# Patient Record
Sex: MALE | Race: WHITE | NOT HISPANIC OR LATINO | Employment: OTHER | URBAN - METROPOLITAN AREA
[De-identification: names, ages, dates, MRNs, and addresses within clinical notes are randomized per-mention and may not be internally consistent; named-entity substitution may affect disease eponyms.]

---

## 2019-05-29 ENCOUNTER — HOSPITAL ENCOUNTER (INPATIENT)
Facility: HOSPITAL | Age: 74
LOS: 7 days | Discharge: HOME HEALTH | DRG: 234 | End: 2019-06-05
Attending: EMERGENCY MEDICINE | Admitting: THORACIC SURGERY (CARDIOTHORACIC VASCULAR SURGERY)
Payer: MEDICARE

## 2019-05-29 DIAGNOSIS — R07.9 CHEST PAIN: ICD-10-CM

## 2019-05-29 DIAGNOSIS — Z95.1 S/P CABG X 5: Primary | ICD-10-CM

## 2019-05-29 DIAGNOSIS — I21.4 NSTEMI (NON-ST ELEVATED MYOCARDIAL INFARCTION): ICD-10-CM

## 2019-05-29 DIAGNOSIS — Z98.890 POST-OPERATIVE STATE: ICD-10-CM

## 2019-05-29 DIAGNOSIS — M76.899 OTHER SPECIFIED ENTHESOPATHIES OF UNSPECIFIED LOWER LIMB, EXCLUDING FOOT: ICD-10-CM

## 2019-05-29 PROBLEM — I25.119 CORONARY ARTERY DISEASE INVOLVING NATIVE CORONARY ARTERY OF NATIVE HEART WITH ANGINA PECTORIS: Status: ACTIVE | Noted: 2019-05-29

## 2019-05-29 PROBLEM — R94.31 ABNORMAL ECG: Status: ACTIVE | Noted: 2019-05-29

## 2019-05-29 PROBLEM — I20.9 AP (ANGINA PECTORIS): Status: ACTIVE | Noted: 2019-05-29

## 2019-05-29 PROBLEM — D72.829 LEUKOCYTOSIS: Status: ACTIVE | Noted: 2019-05-29

## 2019-05-29 LAB
ALBUMIN SERPL BCP-MCNC: 3.8 G/DL (ref 3.5–5.2)
ALP SERPL-CCNC: 80 U/L (ref 55–135)
ALT SERPL W/O P-5'-P-CCNC: 16 U/L (ref 10–44)
ANION GAP SERPL CALC-SCNC: 9 MMOL/L (ref 8–16)
APTT BLDCRRT: 26.2 SEC (ref 21–32)
AST SERPL-CCNC: 60 U/L (ref 10–40)
BASOPHILS # BLD AUTO: 0.03 K/UL (ref 0–0.2)
BASOPHILS NFR BLD: 0.2 % (ref 0–1.9)
BILIRUB SERPL-MCNC: 0.6 MG/DL (ref 0.1–1)
BNP SERPL-MCNC: 247 PG/ML (ref 0–99)
BUN SERPL-MCNC: 9 MG/DL (ref 8–23)
CALCIUM SERPL-MCNC: 10.2 MG/DL (ref 8.7–10.5)
CHLORIDE SERPL-SCNC: 102 MMOL/L (ref 95–110)
CHOLEST SERPL-MCNC: 194 MG/DL (ref 120–199)
CHOLEST/HDLC SERPL: 4.6 {RATIO} (ref 2–5)
CO2 SERPL-SCNC: 25 MMOL/L (ref 23–29)
CORONARY STENOSIS: ABNORMAL
CREAT SERPL-MCNC: 0.9 MG/DL (ref 0.5–1.4)
DIASTOLIC DYSFUNCTION: NO
DIFFERENTIAL METHOD: ABNORMAL
EOSINOPHIL # BLD AUTO: 0 K/UL (ref 0–0.5)
EOSINOPHIL NFR BLD: 0.3 % (ref 0–8)
ERYTHROCYTE [DISTWIDTH] IN BLOOD BY AUTOMATED COUNT: 12.7 % (ref 11.5–14.5)
EST. GFR  (AFRICAN AMERICAN): >60 ML/MIN/1.73 M^2
EST. GFR  (NON AFRICAN AMERICAN): >60 ML/MIN/1.73 M^2
ESTIMATED PA SYSTOLIC PRESSURE: 30.46
GLUCOSE SERPL-MCNC: 138 MG/DL (ref 70–110)
HCT VFR BLD AUTO: 42.6 % (ref 40–54)
HDLC SERPL-MCNC: 42 MG/DL (ref 40–75)
HDLC SERPL: 21.6 % (ref 20–50)
HGB BLD-MCNC: 15.1 G/DL (ref 14–18)
INR PPP: 0.9 (ref 0.8–1.2)
LDLC SERPL CALC-MCNC: 134 MG/DL (ref 63–159)
LYMPHOCYTES # BLD AUTO: 1.3 K/UL (ref 1–4.8)
LYMPHOCYTES NFR BLD: 9.1 % (ref 18–48)
MCH RBC QN AUTO: 30.2 PG (ref 27–31)
MCHC RBC AUTO-ENTMCNC: 35.4 G/DL (ref 32–36)
MCV RBC AUTO: 85 FL (ref 82–98)
MONOCYTES # BLD AUTO: 1 K/UL (ref 0.3–1)
MONOCYTES NFR BLD: 7.4 % (ref 4–15)
NEUTROPHILS # BLD AUTO: 11.6 K/UL (ref 1.8–7.7)
NEUTROPHILS NFR BLD: 83 % (ref 38–73)
NONHDLC SERPL-MCNC: 152 MG/DL
PLATELET # BLD AUTO: 232 K/UL (ref 150–350)
PMV BLD AUTO: 9.2 FL (ref 9.2–12.9)
POTASSIUM SERPL-SCNC: 3.7 MMOL/L (ref 3.5–5.1)
PROT SERPL-MCNC: 6.6 G/DL (ref 6–8.4)
PROTHROMBIN TIME: 10.3 SEC (ref 9–12.5)
RBC # BLD AUTO: 5 M/UL (ref 4.6–6.2)
RETIRED EF AND QEF - SEE NOTES: 60 (ref 55–65)
SODIUM SERPL-SCNC: 136 MMOL/L (ref 136–145)
TRIGL SERPL-MCNC: 90 MG/DL (ref 30–150)
TROPONIN I SERPL DL<=0.01 NG/ML-MCNC: 6.32 NG/ML (ref 0–0.03)
TROPONIN I SERPL DL<=0.01 NG/ML-MCNC: 7.46 NG/ML (ref 0–0.03)
WBC # BLD AUTO: 13.94 K/UL (ref 3.9–12.7)

## 2019-05-29 PROCEDURE — 80053 COMPREHEN METABOLIC PANEL: CPT

## 2019-05-29 PROCEDURE — 93306 TTE W/DOPPLER COMPLETE: CPT | Mod: 26,,, | Performed by: INTERNAL MEDICINE

## 2019-05-29 PROCEDURE — 93455 CATH LAB PROCEDURE: ICD-10-PCS | Mod: 26,,, | Performed by: INTERNAL MEDICINE

## 2019-05-29 PROCEDURE — 80061 LIPID PANEL: CPT

## 2019-05-29 PROCEDURE — 93010 EKG 12-LEAD: ICD-10-PCS | Mod: 59,ICN,, | Performed by: INTERNAL MEDICINE

## 2019-05-29 PROCEDURE — 21400001 HC TELEMETRY ROOM

## 2019-05-29 PROCEDURE — 99291 CRITICAL CARE FIRST HOUR: CPT | Mod: 25

## 2019-05-29 PROCEDURE — 63600175 PHARM REV CODE 636 W HCPCS: Performed by: EMERGENCY MEDICINE

## 2019-05-29 PROCEDURE — 99223 1ST HOSP IP/OBS HIGH 75: CPT | Mod: 25,,, | Performed by: INTERNAL MEDICINE

## 2019-05-29 PROCEDURE — 63600175 PHARM REV CODE 636 W HCPCS

## 2019-05-29 PROCEDURE — 25000003 PHARM REV CODE 250: Performed by: INTERNAL MEDICINE

## 2019-05-29 PROCEDURE — 93306 2D ECHO WITH COLOR FLOW DOPPLER: ICD-10-PCS | Mod: 26,,, | Performed by: INTERNAL MEDICINE

## 2019-05-29 PROCEDURE — 25000003 PHARM REV CODE 250: Performed by: NURSE PRACTITIONER

## 2019-05-29 PROCEDURE — 85730 THROMBOPLASTIN TIME PARTIAL: CPT

## 2019-05-29 PROCEDURE — 25000003 PHARM REV CODE 250

## 2019-05-29 PROCEDURE — 96361 HYDRATE IV INFUSION ADD-ON: CPT

## 2019-05-29 PROCEDURE — 36415 COLL VENOUS BLD VENIPUNCTURE: CPT

## 2019-05-29 PROCEDURE — 83880 ASSAY OF NATRIURETIC PEPTIDE: CPT

## 2019-05-29 PROCEDURE — 84484 ASSAY OF TROPONIN QUANT: CPT | Mod: 91

## 2019-05-29 PROCEDURE — 85025 COMPLETE CBC W/AUTO DIFF WBC: CPT

## 2019-05-29 PROCEDURE — 99223 PR INITIAL HOSPITAL CARE,LEVL III: ICD-10-PCS | Mod: 25,,, | Performed by: INTERNAL MEDICINE

## 2019-05-29 PROCEDURE — 96374 THER/PROPH/DIAG INJ IV PUSH: CPT

## 2019-05-29 PROCEDURE — 93010 ELECTROCARDIOGRAM REPORT: CPT | Mod: 59,ICN,, | Performed by: INTERNAL MEDICINE

## 2019-05-29 PROCEDURE — C1769 GUIDE WIRE: HCPCS

## 2019-05-29 PROCEDURE — 99152 CATH LAB PROCEDURE: ICD-10-PCS | Mod: ,,, | Performed by: INTERNAL MEDICINE

## 2019-05-29 PROCEDURE — 99152 MOD SED SAME PHYS/QHP 5/>YRS: CPT | Mod: ,,, | Performed by: INTERNAL MEDICINE

## 2019-05-29 PROCEDURE — 85610 PROTHROMBIN TIME: CPT

## 2019-05-29 PROCEDURE — 25000003 PHARM REV CODE 250: Performed by: EMERGENCY MEDICINE

## 2019-05-29 PROCEDURE — 93005 ELECTROCARDIOGRAM TRACING: CPT

## 2019-05-29 PROCEDURE — 85730 THROMBOPLASTIN TIME PARTIAL: CPT | Mod: 91

## 2019-05-29 PROCEDURE — 93455 CORONARY ART/GRFT ANGIO S&I: CPT | Mod: 26,,, | Performed by: INTERNAL MEDICINE

## 2019-05-29 PROCEDURE — 86920 COMPATIBILITY TEST SPIN: CPT

## 2019-05-29 PROCEDURE — 93306 TTE W/DOPPLER COMPLETE: CPT

## 2019-05-29 RX ORDER — HEPARIN SODIUM,PORCINE/D5W 25000/250
12 INTRAVENOUS SOLUTION INTRAVENOUS CONTINUOUS
Status: DISCONTINUED | OUTPATIENT
Start: 2019-05-29 | End: 2019-05-29

## 2019-05-29 RX ORDER — ONDANSETRON 8 MG/1
8 TABLET, ORALLY DISINTEGRATING ORAL EVERY 8 HOURS PRN
Status: DISCONTINUED | OUTPATIENT
Start: 2019-05-29 | End: 2019-06-05 | Stop reason: HOSPADM

## 2019-05-29 RX ORDER — SODIUM CHLORIDE 9 MG/ML
INJECTION, SOLUTION INTRAVENOUS CONTINUOUS
Status: DISCONTINUED | OUTPATIENT
Start: 2019-05-29 | End: 2019-05-30

## 2019-05-29 RX ORDER — SODIUM CHLORIDE 9 MG/ML
INJECTION, SOLUTION INTRAVENOUS CONTINUOUS
Status: DISCONTINUED | OUTPATIENT
Start: 2019-05-29 | End: 2019-05-29

## 2019-05-29 RX ORDER — ASPIRIN 81 MG/1
81 TABLET ORAL DAILY
Status: DISCONTINUED | OUTPATIENT
Start: 2019-05-30 | End: 2019-05-31

## 2019-05-29 RX ORDER — HEPARIN SODIUM,PORCINE/D5W 25000/250
12 INTRAVENOUS SOLUTION INTRAVENOUS CONTINUOUS
Status: ACTIVE | OUTPATIENT
Start: 2019-05-29 | End: 2019-05-31

## 2019-05-29 RX ORDER — ASPIRIN 325 MG
325 TABLET ORAL
Status: COMPLETED | OUTPATIENT
Start: 2019-05-29 | End: 2019-05-29

## 2019-05-29 RX ORDER — METOPROLOL TARTRATE 25 MG/1
25 TABLET, FILM COATED ORAL 2 TIMES DAILY
Status: DISCONTINUED | OUTPATIENT
Start: 2019-05-29 | End: 2019-05-31

## 2019-05-29 RX ORDER — ONDANSETRON 2 MG/ML
4 INJECTION INTRAMUSCULAR; INTRAVENOUS EVERY 12 HOURS PRN
Status: DISCONTINUED | OUTPATIENT
Start: 2019-05-29 | End: 2019-05-29

## 2019-05-29 RX ORDER — ATORVASTATIN CALCIUM 40 MG/1
80 TABLET, FILM COATED ORAL DAILY
Status: DISCONTINUED | OUTPATIENT
Start: 2019-05-29 | End: 2019-06-05 | Stop reason: HOSPADM

## 2019-05-29 RX ORDER — HYDROCODONE BITARTRATE AND ACETAMINOPHEN 5; 325 MG/1; MG/1
1 TABLET ORAL EVERY 6 HOURS PRN
Status: DISCONTINUED | OUTPATIENT
Start: 2019-05-29 | End: 2019-05-31

## 2019-05-29 RX ORDER — ACETAMINOPHEN 325 MG/1
650 TABLET ORAL EVERY 6 HOURS PRN
Status: DISCONTINUED | OUTPATIENT
Start: 2019-05-29 | End: 2019-06-05 | Stop reason: HOSPADM

## 2019-05-29 RX ORDER — HYDROCODONE BITARTRATE AND ACETAMINOPHEN 500; 5 MG/1; MG/1
TABLET ORAL
Status: DISCONTINUED | OUTPATIENT
Start: 2019-05-29 | End: 2019-06-04 | Stop reason: SDUPTHER

## 2019-05-29 RX ADMIN — ASPIRIN 325 MG ORAL TABLET 325 MG: 325 PILL ORAL at 08:05

## 2019-05-29 RX ADMIN — SODIUM CHLORIDE: 9 INJECTION, SOLUTION INTRAVENOUS at 02:05

## 2019-05-29 RX ADMIN — METOPROLOL TARTRATE 25 MG: 25 TABLET ORAL at 09:05

## 2019-05-29 RX ADMIN — HEPARIN SODIUM AND DEXTROSE 12 UNITS/KG/HR: 10000; 5 INJECTION INTRAVENOUS at 10:05

## 2019-05-29 RX ADMIN — NITROGLYCERIN 1 INCH: 20 OINTMENT TOPICAL at 08:05

## 2019-05-29 RX ADMIN — SODIUM CHLORIDE: 0.9 INJECTION, SOLUTION INTRAVENOUS at 10:05

## 2019-05-29 RX ADMIN — ATORVASTATIN CALCIUM 80 MG: 40 TABLET, FILM COATED ORAL at 10:05

## 2019-05-29 NOTE — CONSULTS
"[facility] -   Cardiothoracic Surgery  Consult Note    Patient Name: Chapin Calderon  MRN: [id number]  Admission Date: 5/29/2019  Attending Physician: Orville Montero MD  Referring Provider: Self, Aaareferral    Patient information was obtained from patient, spouse/SO, relative(s), past medical records and ER records.     Consults  Subjective:     Principal Problem: NSTEMI (non-ST elevated myocardial infarction)    History of Present Illness: 05/29/2019  The patient is a 73 year old male with 80% proximal LAD stenosis that was worked up for NSTEMI. The patient has a history of prostate cancer. The patient was in his usual state of health prior to 1 day ago, when he had an episode of mid-sternal chest pain that began while he was eating breakfast. Pain described as a "pressure". The patient reports that this chest pain episode lasted approximately 12 hours. The patient denies any episodes of this nature occurring prior to the mentioned episode 1 day ago. The patient reports that prior to yesterday, he has been living an active lifestyle. 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 The patient arrived to the ED today for his complaints. Trop 7.465. . Patient had a cardiac catheterization done today as well as echocardiogram. Patient denies SOB, orthopnea, cough, palpitations, fever, malaise, chills, syncope, dizziness, light-headedness, anxiety, abdominal pain, constipation, diarrhea, nausea, vomiting, and diaphoresis.          No current facility-administered medications on file prior to encounter.      Current Outpatient Medications on File Prior to Encounter   Medication Sig    naproxen (EC-NAPROSYN) 375 MG TbEC EC tablet Take 1 tablet (375 mg total) by mouth 2 (two) times daily with meals.       Review of patient's allergies indicates:   Allergen Reactions    Penicillins Rash       Past Medical History:   Diagnosis Date    Prostate cancer      Past Surgical History: "   Procedure Laterality Date    APPENDECTOMY       Family History     None        Tobacco Use    Smoking status: Former Smoker    Smokeless tobacco: Never Used   Substance and Sexual Activity    Alcohol use: No    Drug use: No    Sexual activity: Not on file     Review of Systems   Constitutional: Positive for fatigue. Negative for activity change, appetite change, chills, diaphoresis, fever and unexpected weight change.   HENT: Positive for congestion. Negative for ear discharge, ear pain, hearing loss, nosebleeds, sinus pressure and sinus pain.    Eyes: Negative for photophobia and visual disturbance.   Respiratory: Positive for chest tightness. Negative for cough, choking, shortness of breath, wheezing and stridor.    Cardiovascular: Positive for chest pain. Negative for palpitations and leg swelling.   Gastrointestinal: Negative for abdominal distention, abdominal pain, blood in stool, constipation, diarrhea, nausea and vomiting.   Endocrine: Negative for polydipsia, polyphagia and polyuria.   Genitourinary: Negative for decreased urine volume, difficulty urinating, dysuria, flank pain, frequency, hematuria and urgency.   Musculoskeletal: Negative for back pain, gait problem, joint swelling, myalgias, neck pain and neck stiffness.   Skin: Negative for pallor, rash and wound.   Allergic/Immunologic: Negative for environmental allergies, food allergies and immunocompromised state.   Neurological: Positive for weakness. Negative for dizziness, seizures, syncope, facial asymmetry, light-headedness, numbness and headaches.   Hematological: Does not bruise/bleed easily.   Psychiatric/Behavioral: Negative for agitation, confusion, hallucinations, self-injury and suicidal ideas. The patient is not nervous/anxious.      Objective:     Vital Signs (Most Recent):  Temp: (P) 97.6 °F (36.4 °C) (05/29/19 1500)  Pulse: 62 (05/29/19 1600)  Resp: 15 (05/29/19 1600)  BP: (!) 117/58 (05/29/19 1600)  SpO2: 98 % (05/29/19 1600)  Vital Signs (24h Range):  Temp:  [97.6 °F (36.4 °C)-98.9 °F (37.2 °C)] (P) 97.6 °F (36.4 °C)  Pulse:  [59-74] 62  Resp:  [14-22] 15  SpO2:  [95 %-98 %] 98 %  BP: (108-168)/(56-80) 117/58     Weight: 75.9 kg (167 lb 5.3 oz)  Body mass index is 26.21 kg/m².    SpO2: 98 %  O2 Device (Oxygen Therapy): room air     Intake/Output - Last 3 Shifts     None           Lines/Drains/Airways     Peripheral Intravenous Line                 Peripheral IV - Single Lumen 05/29/19 0820 20 G Left Forearm less than 1 day                 STS Risk Score:  Risk of Mortality:  1.309%  Renal Failure:  0.648%  Permanent Stroke:  0.912%  Prolonged Ventilation:  4.398%  DSW Infection:  0.117%  Reoperation:  2.001%  Morbidity or Mortality:  7.093%  Short Length of Stay:  57.152%  Long Length of Stay:  2.799%    Physical Exam   Constitutional: He is oriented to person, place, and time. He appears well-developed and well-nourished. No distress.   HENT:   Head: Normocephalic and atraumatic.   Eyes: Pupils are equal, round, and reactive to light. EOM are normal.   Neck: Normal range of motion. Neck supple. No JVD present.   Cardiovascular: Normal rate, regular rhythm and intact distal pulses. Exam reveals no gallop and no friction rub.   No murmur heard.  Pulmonary/Chest: Effort normal and breath sounds normal. No stridor. No respiratory distress. He has no wheezes. He has no rales. He exhibits no tenderness.   Abdominal: Soft. Bowel sounds are normal. He exhibits no distension. There is no tenderness. There is no guarding.   Musculoskeletal: Normal range of motion. He exhibits no edema, tenderness or deformity.   Neurological: He is alert and oriented to person, place, and time. He displays normal reflexes. No cranial nerve deficit or sensory deficit. He exhibits normal muscle tone. Coordination normal.   Skin: Skin is warm and dry. Capillary refill takes less than 2 seconds. No rash noted. He is not diaphoretic. No erythema. No pallor.    Psychiatric: He has a normal mood and affect. His behavior is normal. Judgment and thought content normal.   Nursing note and vitals reviewed.      Significant Labs:  BMP:   Recent Labs   Lab 05/29/19  0819   *      K 3.7      CO2 25   BUN 9   CREATININE 0.9   CALCIUM 10.2     CBC:   Recent Labs   Lab 05/29/19  0819   WBC 13.94*   RBC 5.00   HGB 15.1   HCT 42.6      MCV 85   MCH 30.2   MCHC 35.4       Significant Diagnostics:  I have reviewed all pertinent imaging results/findings within the past 24 hours.    Assessment/Plan:     NYHA Score: NYHA III: marked limitation of physical activity, comfortable at rest    Coronary artery disease involving native coronary artery of native heart with angina pectoris  05/29/2019  The patient is a 73 year old male with 80% proximal LAD stenosis that was worked up for NSTEMI. The patient has a history of prostate cancer. The patient with 80% proximal LAD stenosis and multivessel coronary artery disease is a candidate for urgent coronary artery bypass grafting. The risks and benefits of surgery have been explained to the patient, his wife, and his adult daughter, in great detail. All questions have been answered to the patient's satisfaction. The patient has agreed to go ahead with coronary artery bypass grafting, which will be scheduled for 05/31/2019.         Thank you for your consult. I will follow-up with patient. Please contact us if you have any additional questions.    Duane Carpenter NP  Cardiothoracic Surgery  [facility] [place]

## 2019-05-29 NOTE — SUBJECTIVE & OBJECTIVE
Past Medical History:   Diagnosis Date    Prostate cancer        Past Surgical History:   Procedure Laterality Date    APPENDECTOMY         Review of patient's allergies indicates:   Allergen Reactions    Penicillins Rash       No current facility-administered medications on file prior to encounter.      Current Outpatient Medications on File Prior to Encounter   Medication Sig    naproxen (EC-NAPROSYN) 375 MG TbEC EC tablet Take 1 tablet (375 mg total) by mouth 2 (two) times daily with meals.     Family History     None        Tobacco Use    Smoking status: Never Smoker    Smokeless tobacco: Never Used   Substance and Sexual Activity    Alcohol use: No    Drug use: No    Sexual activity: Not on file     Review of Systems   Constitution: Negative.   HENT: Negative.    Eyes: Negative.    Cardiovascular: Positive for chest pain.   Respiratory: Negative.    Endocrine: Negative.    Hematologic/Lymphatic: Negative.    Skin: Negative.    Musculoskeletal: Negative.    Gastrointestinal: Negative.    Genitourinary: Negative.    Neurological: Negative.    Psychiatric/Behavioral: Negative.    Allergic/Immunologic: Negative.      Objective:     Vital Signs (Most Recent):  Temp: 98.9 °F (37.2 °C) (05/29/19 0750)  Pulse: 62 (05/29/19 0831)  Resp: 19 (05/29/19 0831)  BP: (!) 152/79 (05/29/19 0831)  SpO2: 96 % (05/29/19 0831) Vital Signs (24h Range):  Temp:  [98.9 °F (37.2 °C)] 98.9 °F (37.2 °C)  Pulse:  [62-74] 62  Resp:  [18-19] 19  SpO2:  [96 %-97 %] 96 %  BP: (152-168)/(79-80) 152/79     Weight: 75.9 kg (167 lb 5.3 oz)  Body mass index is 26.21 kg/m².    SpO2: 96 %  O2 Device (Oxygen Therapy): room air    No intake or output data in the 24 hours ending 05/29/19 1019    Lines/Drains/Airways     Peripheral Intravenous Line                 Peripheral IV - Single Lumen 05/29/19 0820 20 G Left Forearm less than 1 day                Physical Exam   Constitutional: He is oriented to person, place, and time. He appears  well-developed and well-nourished.   HENT:   Head: Normocephalic.   Neck: Normal range of motion. Neck supple. Normal carotid pulses, no hepatojugular reflux and no JVD present. Carotid bruit is not present. No thyromegaly present.   Cardiovascular: Normal rate, regular rhythm, S1 normal, S2 normal and intact distal pulses. PMI is not displaced. Exam reveals no S3, no S4, no distant heart sounds, no friction rub, no midsystolic click and no opening snap.   No murmur heard.  Pulses:       Radial pulses are 2+ on the right side, and 2+ on the left side.   Pulmonary/Chest: Effort normal and breath sounds normal. He has no wheezes. He has no rales.   Abdominal: Soft. Bowel sounds are normal. He exhibits no distension, no abdominal bruit, no ascites and no mass. There is no tenderness.   Musculoskeletal: He exhibits no edema.   Neurological: He is alert and oriented to person, place, and time.   Skin: Skin is warm.   Psychiatric: He has a normal mood and affect. His behavior is normal.   Nursing note and vitals reviewed.      Significant Labs:   BMP:   Recent Labs   Lab 05/29/19 0819   *      K 3.7      CO2 25   BUN 9   CREATININE 0.9   CALCIUM 10.2   , CMP   Recent Labs   Lab 05/29/19 0819      K 3.7      CO2 25   *   BUN 9   CREATININE 0.9   CALCIUM 10.2   PROT 6.6   ALBUMIN 3.8   BILITOT 0.6   ALKPHOS 80   AST 60*   ALT 16   ANIONGAP 9   ESTGFRAFRICA >60   EGFRNONAA >60   , CBC   Recent Labs   Lab 05/29/19 0819   WBC 13.94*   HGB 15.1   HCT 42.6      , INR   Recent Labs   Lab 05/29/19 0819   INR 0.9    and Troponin   Recent Labs   Lab 05/29/19 0819   TROPONINI 6.324*       Significant Imaging: Echocardiogram: 2D echo with color flow doppler: No results found for this or any previous visit.

## 2019-05-29 NOTE — HPI
Chapin Calderon is a 73 year old male with a past medical history of prostate cancer who presented to the ED with complaints of chest pain that began one day prior to presentation. The pain is described as a constant pressure in his left chest that rates a 6/10 on a pain scale. The patient denies radiation and associated symptoms including SOB, nausea, vomiting, diaphoresis and lower extremity edema. He tried Tums with no relieved. However, symptoms resolved shortly after receiving an Asprin in the ED. In the ED, the patient's initial troponin was 6.324 with an increase to 7.465. Code status was discussed with the patient. He is a full code. His wife, Daniela Calderon, is his surrogate medical decision maker.

## 2019-05-29 NOTE — SUBJECTIVE & OBJECTIVE
No current facility-administered medications on file prior to encounter.      Current Outpatient Medications on File Prior to Encounter   Medication Sig    naproxen (EC-NAPROSYN) 375 MG TbEC EC tablet Take 1 tablet (375 mg total) by mouth 2 (two) times daily with meals.       Review of patient's allergies indicates:   Allergen Reactions    Penicillins Rash       Past Medical History:   Diagnosis Date    Prostate cancer      Past Surgical History:   Procedure Laterality Date    APPENDECTOMY       Family History     None        Tobacco Use    Smoking status: Former Smoker    Smokeless tobacco: Never Used   Substance and Sexual Activity    Alcohol use: No    Drug use: No    Sexual activity: Not on file     Review of Systems   Constitutional: Positive for fatigue. Negative for activity change, appetite change, chills, diaphoresis, fever and unexpected weight change.   HENT: Positive for congestion. Negative for ear discharge, ear pain, hearing loss, nosebleeds, sinus pressure and sinus pain.    Eyes: Negative for photophobia and visual disturbance.   Respiratory: Positive for chest tightness. Negative for cough, choking, shortness of breath, wheezing and stridor.    Cardiovascular: Positive for chest pain. Negative for palpitations and leg swelling.   Gastrointestinal: Negative for abdominal distention, abdominal pain, blood in stool, constipation, diarrhea, nausea and vomiting.   Endocrine: Negative for polydipsia, polyphagia and polyuria.   Genitourinary: Negative for decreased urine volume, difficulty urinating, dysuria, flank pain, frequency, hematuria and urgency.   Musculoskeletal: Negative for back pain, gait problem, joint swelling, myalgias, neck pain and neck stiffness.   Skin: Negative for pallor, rash and wound.   Allergic/Immunologic: Negative for environmental allergies, food allergies and immunocompromised state.   Neurological: Positive for weakness. Negative for dizziness, seizures, syncope,  facial asymmetry, light-headedness, numbness and headaches.   Hematological: Does not bruise/bleed easily.   Psychiatric/Behavioral: Negative for agitation, confusion, hallucinations, self-injury and suicidal ideas. The patient is not nervous/anxious.      Objective:     Vital Signs (Most Recent):  Temp: (P) 97.6 °F (36.4 °C) (05/29/19 1500)  Pulse: 62 (05/29/19 1600)  Resp: 15 (05/29/19 1600)  BP: (!) 117/58 (05/29/19 1600)  SpO2: 98 % (05/29/19 1600) Vital Signs (24h Range):  Temp:  [97.6 °F (36.4 °C)-98.9 °F (37.2 °C)] (P) 97.6 °F (36.4 °C)  Pulse:  [59-74] 62  Resp:  [14-22] 15  SpO2:  [95 %-98 %] 98 %  BP: (108-168)/(56-80) 117/58     Weight: 75.9 kg (167 lb 5.3 oz)  Body mass index is 26.21 kg/m².    SpO2: 98 %  O2 Device (Oxygen Therapy): room air     Intake/Output - Last 3 Shifts     None           Lines/Drains/Airways     Peripheral Intravenous Line                 Peripheral IV - Single Lumen 05/29/19 0820 20 G Left Forearm less than 1 day                 STS Risk Score:  Risk of Mortality:  1.309%  Renal Failure:  0.648%  Permanent Stroke:  0.912%  Prolonged Ventilation:  4.398%  DSW Infection:  0.117%  Reoperation:  2.001%  Morbidity or Mortality:  7.093%  Short Length of Stay:  57.152%  Long Length of Stay:  2.799%    Physical Exam   Constitutional: He is oriented to person, place, and time. He appears well-developed and well-nourished. No distress.   HENT:   Head: Normocephalic and atraumatic.   Eyes: Pupils are equal, round, and reactive to light. EOM are normal.   Neck: Normal range of motion. Neck supple. No JVD present.   Cardiovascular: Normal rate, regular rhythm and intact distal pulses. Exam reveals no gallop and no friction rub.   No murmur heard.  Pulmonary/Chest: Effort normal and breath sounds normal. No stridor. No respiratory distress. He has no wheezes. He has no rales. He exhibits no tenderness.   Abdominal: Soft. Bowel sounds are normal. He exhibits no distension. There is no  tenderness. There is no guarding.   Musculoskeletal: Normal range of motion. He exhibits no edema, tenderness or deformity.   Neurological: He is alert and oriented to person, place, and time. He displays normal reflexes. No cranial nerve deficit or sensory deficit. He exhibits normal muscle tone. Coordination normal.   Skin: Skin is warm and dry. Capillary refill takes less than 2 seconds. No rash noted. He is not diaphoretic. No erythema. No pallor.   Psychiatric: He has a normal mood and affect. His behavior is normal. Judgment and thought content normal.   Nursing note and vitals reviewed.      Significant Labs:  BMP:   Recent Labs   Lab 05/29/19  0819   *      K 3.7      CO2 25   BUN 9   CREATININE 0.9   CALCIUM 10.2     CBC:   Recent Labs   Lab 05/29/19  0819   WBC 13.94*   RBC 5.00   HGB 15.1   HCT 42.6      MCV 85   MCH 30.2   MCHC 35.4       Significant Diagnostics:  I have reviewed all pertinent imaging results/findings within the past 24 hours.

## 2019-05-29 NOTE — ED PROVIDER NOTES
"SCRIBE #1 NOTE: I, Jose Eduardo Sosa, am scribing for, and in the presence of, Grayson Boucher MD. I have scribed the entire note.      History    No chief complaint on file.      Review of patient's allergies indicates:   Allergen Reactions    Penicillins Rash        HPI   HPI    5/29/2019, 8:18 AM   History obtained from the patient      History of Present Illness: Chapin Calderon is a 73 y.o. male patient who presents to the Emergency Department for CP which onset gradually yesterday around 12:00 PM. Patient states his pain is mid-sternal and non-radiating. Patient said it feels like a "balloon swollen up in me". Symptoms are constant and moderate in severity. No mitigating or exacerbating factors reported. Associated sxs include indigestion. Patient denies any HA, fever, chills, numbness, weakness, cough, N/V, dysuria, and all other sxs at this time. No prior Tx. No further complaints or concerns at this time.       Arrival mode: Personal vehicle      PCP: Tiffanie Vicente MD       Past Medical History:  History reviewed. No pertinent medical history.    Past Surgical History:  Past Surgical History:   Procedure Laterality Date    APPENDECTOMY         Family History:  History reviewed. No pertinent family history.    Social History:  Social History     Tobacco Use    Smoking status: Never Smoker    Smokeless tobacco: Never Used   Substance and Sexual Activity    Alcohol use: No    Drug use: No    Sexual activity: Unknown       ROS   Review of Systems   Constitutional: Negative for chills and fever.   HENT: Negative for sore throat.    Respiratory: Negative for cough and shortness of breath.    Cardiovascular: Positive for chest pain.   Gastrointestinal: Negative for nausea and vomiting.        (+) indigestion   Genitourinary: Negative for dysuria.   Musculoskeletal: Negative for back pain.   Skin: Negative for rash.   Neurological: Negative for weakness and numbness.   Hematological: Does not " bruise/bleed easily.   All other systems reviewed and are negative.    Physical Exam      Initial Vitals [05/29/19 0750]   BP Pulse Resp Temp SpO2   (!) 168/80 74 18 98.9 °F (37.2 °C) 97 %      MAP       --          Physical Exam  Nursing Notes and Vital Signs Reviewed.  Constitutional: Patient is in no acute distress. Well-developed and well-nourished.  Head: Atraumatic. Normocephalic.  Eyes: PERRL. EOM intact. Conjunctivae are not pale. No scleral icterus.  ENT: Mucous membranes are moist. Oropharynx is clear and symmetric.    Neck: Supple. Full ROM. No lymphadenopathy.  Cardiovascular: Regular rate. Regular rhythm. No murmurs, rubs, or gallops. Distal pulses are 2+ and symmetric.  Pulmonary/Chest: No respiratory distress. Clear to auscultation bilaterally. No wheezing or rales.  Abdominal: Soft and non-distended.  There is no tenderness.  No rebound, guarding, or rigidity.  Musculoskeletal: Moves all extremities. No obvious deformities. No edema. No calf tenderness.  Skin: Warm and dry.  Neurological:  Alert, awake, and appropriate.  Normal speech.  No acute focal neurological deficits are appreciated.  Psychiatric: Normal affect. Good eye contact. Appropriate in content.    ED Course    Critical Care  Date/Time: 5/29/2019 10:08 AM  Performed by: Grayson Boucher MD  Authorized by: Grayson Boucher MD   Direct patient critical care time: 7 minutes  Additional history critical care time: 7 minutes  Ordering / reviewing critical care time: 7 minutes  Documentation critical care time: 7 minutes  Consulting other physicians critical care time: 7 minutes  Consult with family critical care time: 5 minutes  Total critical care time (exclusive of procedural time) : 40 minutes  Critical care time was exclusive of separately billable procedures and treating other patients and teaching time.  Critical care was necessary to treat or prevent imminent or life-threatening deterioration of the following  "conditions: cardiac failure (NSTEMI).  Critical care was time spent personally by me on the following activities: blood draw for specimens, development of treatment plan with patient or surrogate, discussions with consultants, interpretation of cardiac output measurements, evaluation of patient's response to treatment, examination of patient, obtaining history from patient or surrogate, ordering and performing treatments and interventions, ordering and review of laboratory studies, ordering and review of radiographic studies, pulse oximetry, re-evaluation of patient's condition and review of old charts.        ED Vital Signs:  Vitals:    05/29/19 0750 05/29/19 0807 05/29/19 0831   BP: (!) 168/80  (!) 152/79   Pulse: 74 66 62   Resp: 18  19   Temp: 98.9 °F (37.2 °C)     TempSrc: Oral     SpO2: 97%  96%   Weight: 75.9 kg (167 lb 5.3 oz)     Height: 5' 7" (1.702 m)         Abnormal Lab Results:  Labs Reviewed   CBC W/ AUTO DIFFERENTIAL - Abnormal; Notable for the following components:       Result Value    WBC 13.94 (*)     Gran # (ANC) 11.6 (*)     Gran% 83.0 (*)     Lymph% 9.1 (*)     All other components within normal limits   COMPREHENSIVE METABOLIC PANEL - Abnormal; Notable for the following components:    Glucose 138 (*)     AST 60 (*)     All other components within normal limits   TROPONIN I - Abnormal; Notable for the following components:    Troponin I 6.324 (*)     All other components within normal limits   B-TYPE NATRIURETIC PEPTIDE - Abnormal; Notable for the following components:     (*)     All other components within normal limits   APTT   PROTIME-INR   PROTIME-INR   APTT   TROPONIN I        All Lab Results:  Results for orders placed or performed during the hospital encounter of 05/29/19   CBC auto differential   Result Value Ref Range    WBC 13.94 (H) 3.90 - 12.70 K/uL    RBC 5.00 4.60 - 6.20 M/uL    Hemoglobin 15.1 14.0 - 18.0 g/dL    Hematocrit 42.6 40.0 - 54.0 %    Mean Corpuscular Volume " 85 82 - 98 fL    Mean Corpuscular Hemoglobin 30.2 27.0 - 31.0 pg    Mean Corpuscular Hemoglobin Conc 35.4 32.0 - 36.0 g/dL    RDW 12.7 11.5 - 14.5 %    Platelets 232 150 - 350 K/uL    MPV 9.2 9.2 - 12.9 fL    Gran # (ANC) 11.6 (H) 1.8 - 7.7 K/uL    Lymph # 1.3 1.0 - 4.8 K/uL    Mono # 1.0 0.3 - 1.0 K/uL    Eos # 0.0 0.0 - 0.5 K/uL    Baso # 0.03 0.00 - 0.20 K/uL    Gran% 83.0 (H) 38.0 - 73.0 %    Lymph% 9.1 (L) 18.0 - 48.0 %    Mono% 7.4 4.0 - 15.0 %    Eosinophil% 0.3 0.0 - 8.0 %    Basophil% 0.2 0.0 - 1.9 %    Differential Method Automated    Comprehensive metabolic panel   Result Value Ref Range    Sodium 136 136 - 145 mmol/L    Potassium 3.7 3.5 - 5.1 mmol/L    Chloride 102 95 - 110 mmol/L    CO2 25 23 - 29 mmol/L    Glucose 138 (H) 70 - 110 mg/dL    BUN, Bld 9 8 - 23 mg/dL    Creatinine 0.9 0.5 - 1.4 mg/dL    Calcium 10.2 8.7 - 10.5 mg/dL    Total Protein 6.6 6.0 - 8.4 g/dL    Albumin 3.8 3.5 - 5.2 g/dL    Total Bilirubin 0.6 0.1 - 1.0 mg/dL    Alkaline Phosphatase 80 55 - 135 U/L    AST 60 (H) 10 - 40 U/L    ALT 16 10 - 44 U/L    Anion Gap 9 8 - 16 mmol/L    eGFR if African American >60 >60 mL/min/1.73 m^2    eGFR if non African American >60 >60 mL/min/1.73 m^2   Troponin I #1   Result Value Ref Range    Troponin I 6.324 (H) 0.000 - 0.026 ng/mL   B-Type natriuretic peptide (BNP)   Result Value Ref Range     (H) 0 - 99 pg/mL   Protime-INR   Result Value Ref Range    Prothrombin Time 10.3 9.0 - 12.5 sec    INR 0.9 0.8 - 1.2   APTT   Result Value Ref Range    aPTT 26.2 21.0 - 32.0 sec         Imaging Results:  Imaging Results          X-Ray Chest AP Portable (Final result)  Result time 05/29/19 08:37:15    Final result by Buddy Swanson MD (05/29/19 08:37:15)                 Impression:      No acute findings.      Electronically signed by: Buddy Swanson MD  Date:    05/29/2019  Time:    08:37             Narrative:    EXAMINATION:  XR CHEST AP PORTABLE    CLINICAL HISTORY:  Chest Pain;    TECHNIQUE:  AP view  of the chest was performed.    COMPARISON:  None    FINDINGS:  The cardiac and mediastinal silhouettes appear within normal limits.   The lungs are clear bilaterally.  No acute osseous findings demonstrated.                             The EKG was ordered, reviewed, and independently interpreted by the ED provider.  Interpretation time: 7:59  Rate: 68 BPM  Rhythm: normal sinus rhythm  Interpretation: Possible Left atrial enlargement. No STEMI.             The Emergency Provider reviewed the vital signs and test results, which are outlined above.    ED Discussion     10:03 AM: Discussed pt's case with Dr. Chase (Cardiology) who recommends pt be given heparin and asprin. Plan on cath lab today.    10:10 AM: Discussed case with GUILLE Narvaez (Hospital Medicine). Dr. Montero agrees with current care and management of pt and accepts admission.   Admitting Service:   Admitting Physician: Dr. Montero  Admit to: Obs Tele    10:20 AM: Re-evaluated pt. I have discussed test results, shared treatment plan, and the need for admission with patient. Pt expresses understanding at this time and agrees with all information. All questions answered. Pt has no further questions or concerns at this time. Pt is ready for admit.      ED Medication(s):  Medications   nitroGLYCERIN 2% TD oint ointment 1 inch (1 inch Topical (Top) Given 5/29/19 0813)   heparin 25,000 units in dextrose 5% (100 units/ml) IV bolus from bag INITIAL BOLUS (max bolus 4000 units) (has no administration in time range)   heparin 25,000 units in dextrose 5% 250 mL (100 units/mL) infusion LOW INTENSITY nomogram - [facility] (has no administration in time range)   heparin 25,000 units in dextrose 5% (100 units/ml) IV bolus from bag - ADDITIONAL PRN BOLUS - 60 units/kg (max bolus 4000 units) (has no administration in time range)   heparin 25,000 units in dextrose 5% (100 units/ml) IV bolus from bag - ADDITIONAL PRN BOLUS - 30 units/kg (max bolus 4000 units) (has no  administration in time range)   0.9%  NaCl infusion (has no administration in time range)   atorvastatin tablet 80 mg (has no administration in time range)   aspirin tablet 325 mg (325 mg Oral Given 5/29/19 0813)     Current Discharge Medication List                Medical Decision Making    Medical Decision Making:   Clinical Tests:   Lab Tests: Ordered and Reviewed  Radiological Study: Ordered and Reviewed  Medical Tests: Ordered and Reviewed           Scribe Attestation:   Scribe #1: I performed the above scribed service and the documentation accurately describes the services I performed. I attest to the accuracy of the note.    Attending:   Physician Attestation Statement for Scribe #1: I, Grayson Boucher MD, personally performed the services described in this documentation, as scribed by Jose Eduardo Sosa, in my presence, and it is both accurate and complete.          Clinical Impression       ICD-10-CM ICD-9-CM   1. NSTEMI (non-ST elevated myocardial infarction) I21.4 410.70   2. Chest pain R07.9 786.50       Disposition:   Disposition: Placed in Observation  Condition: Fair         Grayson Boucher MD  05/29/19 6862

## 2019-05-29 NOTE — HPI
"05/29/2019  The patient is a 73 year old male with 80% proximal LAD stenosis that was worked up for NSTEMI. The patient has a history of prostate cancer. The patient was in his usual state of health prior to 1 day ago, when he had an episode of mid-sternal chest pain that began while he was eating breakfast. Pain described as a "pressure". The patient reports that this chest pain episode lasted approximately 12 hours. The patient denies any episodes of this nature occurring prior to the mentioned episode 1 day ago. The patient reports that prior to yesterday, he has been living an active lifestyle. 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 The patient arrived to the ED today for his complaints. Trop 7.465. . Patient had a cardiac catheterization done today as well as echocardiogram. Patient denies SOB, orthopnea, cough, palpitations, fever, malaise, chills, syncope, dizziness, light-headedness, anxiety, abdominal pain, constipation, diarrhea, nausea, vomiting, and diaphoresis.        "

## 2019-05-29 NOTE — ASSESSMENT & PLAN NOTE
05/29/2019  The patient is a 73 year old male with 80% proximal LAD stenosis that was worked up for NSTEMI. The patient has a history of prostate cancer. The patient with 80% proximal LAD stenosis and multivessel coronary artery disease is a candidate for urgent coronary artery bypass grafting. The risks and benefits of surgery have been explained to the patient, his wife, and his adult daughter, in great detail. All questions have been answered to the patient's satisfaction. The patient has agreed to go ahead with coronary artery bypass grafting, which will be scheduled for 05/31/2019.

## 2019-05-29 NOTE — INTERVAL H&P NOTE
The patient has been examined and the H&P has been reviewed:    I concur with the findings and no changes have occurred since H&P was written.    Anesthesia/Surgery risks, benefits and alternative options discussed and understood by patient/family.          Active Hospital Problems    Diagnosis  POA    NSTEMI (non-ST elevated myocardial infarction) [I21.4]  Yes    Abnormal ECG [R94.31]  Yes    AP (angina pectoris) [I20.9]  Yes      Resolved Hospital Problems   No resolved problems to display.

## 2019-05-29 NOTE — ASSESSMENT & PLAN NOTE
-Continue ASA, heparin, metoprolol and Lipitor.   -Echocardiogram and lipid panel pending.   -Patient was evaluated for Cardiac Rehab and is not a candidate at this time.   -Cardiology is following with plans for [facility] today.

## 2019-05-29 NOTE — CONSULTS
[facility] -   Cardiology  Consult Note    Patient Name: Chapin Calderon  MRN: [id number]  Admission Date: 5/29/2019  Hospital Length of Stay: 0 days  Code Status: No Order   Attending Provider: Grayson Boucher,*   Consulting Provider: Reid Chase MD  Primary Care Physician: Tiffanie Vicente MD  Principal Problem:<principal problem not specified>    Patient information was obtained from patient, spouse/SO, past medical records and ER records.     Inpatient consult to Cardiology  Consult performed by: Reid Chase MD  Consult ordered by: Grayson Boucher MD  Reason for consult: MI        Subjective:     Chief Complaint:  CHEST PAIN     HPI:   Pt c/o chest pain, mid chest, all day yesterday which prompted him to go to ER overnight.  Cp much improved now, seems mostly resolved.  ecg shows NSR, low voltage, possible septal infarct.  Troponin 6.2 on initial labs.   .  No prior h/o CAD.  Active male with no significant health issues otherwise per pt.    Past Medical History:   Diagnosis Date    Prostate cancer        Past Surgical History:   Procedure Laterality Date    APPENDECTOMY         Review of patient's allergies indicates:   Allergen Reactions    Penicillins Rash       No current facility-administered medications on file prior to encounter.      Current Outpatient Medications on File Prior to Encounter   Medication Sig    naproxen (EC-NAPROSYN) 375 MG TbEC EC tablet Take 1 tablet (375 mg total) by mouth 2 (two) times daily with meals.     Family History     None        Tobacco Use    Smoking status: Never Smoker    Smokeless tobacco: Never Used   Substance and Sexual Activity    Alcohol use: No    Drug use: No    Sexual activity: Not on file     Review of Systems   Constitution: Negative.   HENT: Negative.    Eyes: Negative.    Cardiovascular: Positive for chest pain.   Respiratory: Negative.    Endocrine: Negative.    Hematologic/Lymphatic: Negative.    Skin:  Negative.    Musculoskeletal: Negative.    Gastrointestinal: Negative.    Genitourinary: Negative.    Neurological: Negative.    Psychiatric/Behavioral: Negative.    Allergic/Immunologic: Negative.      Objective:     Vital Signs (Most Recent):  Temp: 98.9 °F (37.2 °C) (05/29/19 0750)  Pulse: 62 (05/29/19 0831)  Resp: 19 (05/29/19 0831)  BP: (!) 152/79 (05/29/19 0831)  SpO2: 96 % (05/29/19 0831) Vital Signs (24h Range):  Temp:  [98.9 °F (37.2 °C)] 98.9 °F (37.2 °C)  Pulse:  [62-74] 62  Resp:  [18-19] 19  SpO2:  [96 %-97 %] 96 %  BP: (152-168)/(79-80) 152/79     Weight: 75.9 kg (167 lb 5.3 oz)  Body mass index is 26.21 kg/m².    SpO2: 96 %  O2 Device (Oxygen Therapy): room air    No intake or output data in the 24 hours ending 05/29/19 1019    Lines/Drains/Airways     Peripheral Intravenous Line                 Peripheral IV - Single Lumen 05/29/19 0820 20 G Left Forearm less than 1 day                Physical Exam   Constitutional: He is oriented to person, place, and time. He appears well-developed and well-nourished.   HENT:   Head: Normocephalic.   Neck: Normal range of motion. Neck supple. Normal carotid pulses, no hepatojugular reflux and no JVD present. Carotid bruit is not present. No thyromegaly present.   Cardiovascular: Normal rate, regular rhythm, S1 normal, S2 normal and intact distal pulses. PMI is not displaced. Exam reveals no S3, no S4, no distant heart sounds, no friction rub, no midsystolic click and no opening snap.   No murmur heard.  Pulses:       Radial pulses are 2+ on the right side, and 2+ on the left side.   Pulmonary/Chest: Effort normal and breath sounds normal. He has no wheezes. He has no rales.   Abdominal: Soft. Bowel sounds are normal. He exhibits no distension, no abdominal bruit, no ascites and no mass. There is no tenderness.   Musculoskeletal: He exhibits no edema.   Neurological: He is alert and oriented to person, place, and time.   Skin: Skin is warm.   Psychiatric: He has a  normal mood and affect. His behavior is normal.   Nursing note and vitals reviewed.      Significant Labs:   BMP:   Recent Labs   Lab 05/29/19 0819   *      K 3.7      CO2 25   BUN 9   CREATININE 0.9   CALCIUM 10.2   , CMP   Recent Labs   Lab 05/29/19 0819      K 3.7      CO2 25   *   BUN 9   CREATININE 0.9   CALCIUM 10.2   PROT 6.6   ALBUMIN 3.8   BILITOT 0.6   ALKPHOS 80   AST 60*   ALT 16   ANIONGAP 9   ESTGFRAFRICA >60   EGFRNONAA >60   , CBC   Recent Labs   Lab 05/29/19 0819   WBC 13.94*   HGB 15.1   HCT 42.6      , INR   Recent Labs   Lab 05/29/19 0819   INR 0.9    and Troponin   Recent Labs   Lab 05/29/19 0819   TROPONINI 6.324*       Significant Imaging: Echocardiogram: 2D echo with color flow doppler: No results found for this or any previous visit.    Assessment and Plan:     NSTEMI  RECOMMEND IV HEPARIN GTT, ASA, METOPROLOL, ATORVASTATIN.  NPO  ECHO  IV FLUIDS  LHC TODAY.  INDICATIONS AND ALL RISKS/BENEFITS OF LHC AND POSSIBLE PCI DISCUSSED IN DETAIL WITH PT.  AGREEABLE TO PROCEEDING.  FURTHER RECS TO FOLLOW CATH.      AP (angina pectoris)  See management plan detailed above.     Abnormal ECG  See management plan detailed above.     NSTEMI (non-ST elevated myocardial infarction)  See management plan detailed above.         VTE Risk Mitigation (From admission, onward)        Ordered     heparin 25,000 units in dextrose 5% (100 units/ml) IV bolus from bag INITIAL BOLUS (max bolus 4000 units)  Once      05/29/19 0938     heparin 25,000 units in dextrose 5% 250 mL (100 units/mL) infusion LOW INTENSITY nomogram - [facility]  Continuous      05/29/19 0938     heparin 25,000 units in dextrose 5% (100 units/ml) IV bolus from bag - ADDITIONAL PRN BOLUS - 60 units/kg (max bolus 4000 units)  As needed (PRN)      05/29/19 0938     heparin 25,000 units in dextrose 5% (100 units/ml) IV bolus from bag - ADDITIONAL PRN BOLUS - 30 units/kg (max bolus 4000 units)  As needed (PRN)       05/29/19 0938          Thank you for your consult.    Reid Chase MD  Cardiology   [facility] -

## 2019-05-29 NOTE — OP NOTE
[facility] -   Cardiac Catheterization  Procedure Note    SUMMARY     Chapin Calderon  [id number]  Tiffanie Vicente MD    Date of Procedure: 5/29/2019    Procedure:  1.  LHC  2. LEFT VENTRICULOGRAM 3.  CORONARY ANGIOGRAM      Provider: Reid Chase MD    Assisting Provider:  Trey Higuera    Indications: He was referred for cardiac catheterization to evaluate NSTEMI.    Pre-Procedure Diagnosis:  NSTEMI    Post-Procedure Diagnosis:  Multivessel CAD    Anesthesia: RN IV Sedation    Description of the Findings of the Procedure:     The risks, benefits, complications, treatment options, and expected outcomes were discussed with the patient. The patient and/or family concurred with the proposed plan, giving informed consent. Patient was brought to the cath lab after IV hydration was begun and oral premedication was given    Findings:  Severe multivessel CAD  Normal LVEF  Left radial TR band  See report    Complications: None; patient tolerated the procedure well.    Estimated Blood Loss (EBL): Minimal           Implants: none    Specimens: none    Condition: stable    Disposition: PACU - hemodynamically stable.    Attestation: I was present and scrubbed for the entire procedure.     Recommendations:    Usual post cath care.  CV surgical consult for CABG  Cardiac diet.  Maximize medical tx for CAD.  Risk factor modification.

## 2019-05-29 NOTE — NURSING TRANSFER
Nursing Transfer Note      5/29/2019     Transfer Transferred to room 251 from [facility]     Transfer via BED    Transfer with PERSONAL BELONGINGS    Transported by Alena MARTIN    Medicines sent: NONE    Chart send with patient: YES    Notified:FAMILY WITH PT DURING TRANSPORT  Patient reassessed at: 5/29/2019 AT 1630    Upon arrival to floor: TRANSFERRED TO ROOM. TRANSFERRED TO . TELEMETRY MONITER ON.  IV FLUIDS ON PUMP AT PRESCRIBED RATE.  PROCEDURAL ACCESS SITE WITHOUT BLEEDING OR HEMATOMA.  DRESSING DRY AND INTACT.  CARE HANDED OFF TO VIET MARTIN.

## 2019-05-29 NOTE — H&P (VIEW-ONLY)
[facility] -   Cardiology  Consult Note    Patient Name: Chapin Calderon  MRN: [id number]  Admission Date: 5/29/2019  Hospital Length of Stay: 0 days  Code Status: No Order   Attending Provider: Grayson Boucher,*   Consulting Provider: Reid Chase MD  Primary Care Physician: Tiffanie Vicente MD  Principal Problem:<principal problem not specified>    Patient information was obtained from patient, spouse/SO, past medical records and ER records.     Inpatient consult to Cardiology  Consult performed by: Reid Chase MD  Consult ordered by: Grayson Boucher MD  Reason for consult: MI        Subjective:     Chief Complaint:  CHEST PAIN     HPI:   Pt c/o chest pain, mid chest, all day yesterday which prompted him to go to ER overnight.  Cp much improved now, seems mostly resolved.  ecg shows NSR, low voltage, possible septal infarct.  Troponin 6.2 on initial labs.   .  No prior h/o CAD.  Active male with no significant health issues otherwise per pt.    Past Medical History:   Diagnosis Date    Prostate cancer        Past Surgical History:   Procedure Laterality Date    APPENDECTOMY         Review of patient's allergies indicates:   Allergen Reactions    Penicillins Rash       No current facility-administered medications on file prior to encounter.      Current Outpatient Medications on File Prior to Encounter   Medication Sig    naproxen (EC-NAPROSYN) 375 MG TbEC EC tablet Take 1 tablet (375 mg total) by mouth 2 (two) times daily with meals.     Family History     None        Tobacco Use    Smoking status: Never Smoker    Smokeless tobacco: Never Used   Substance and Sexual Activity    Alcohol use: No    Drug use: No    Sexual activity: Not on file     Review of Systems   Constitution: Negative.   HENT: Negative.    Eyes: Negative.    Cardiovascular: Positive for chest pain.   Respiratory: Negative.    Endocrine: Negative.    Hematologic/Lymphatic: Negative.    Skin:  Negative.    Musculoskeletal: Negative.    Gastrointestinal: Negative.    Genitourinary: Negative.    Neurological: Negative.    Psychiatric/Behavioral: Negative.    Allergic/Immunologic: Negative.      Objective:     Vital Signs (Most Recent):  Temp: 98.9 °F (37.2 °C) (05/29/19 0750)  Pulse: 62 (05/29/19 0831)  Resp: 19 (05/29/19 0831)  BP: (!) 152/79 (05/29/19 0831)  SpO2: 96 % (05/29/19 0831) Vital Signs (24h Range):  Temp:  [98.9 °F (37.2 °C)] 98.9 °F (37.2 °C)  Pulse:  [62-74] 62  Resp:  [18-19] 19  SpO2:  [96 %-97 %] 96 %  BP: (152-168)/(79-80) 152/79     Weight: 75.9 kg (167 lb 5.3 oz)  Body mass index is 26.21 kg/m².    SpO2: 96 %  O2 Device (Oxygen Therapy): room air    No intake or output data in the 24 hours ending 05/29/19 1019    Lines/Drains/Airways     Peripheral Intravenous Line                 Peripheral IV - Single Lumen 05/29/19 0820 20 G Left Forearm less than 1 day                Physical Exam   Constitutional: He is oriented to person, place, and time. He appears well-developed and well-nourished.   HENT:   Head: Normocephalic.   Neck: Normal range of motion. Neck supple. Normal carotid pulses, no hepatojugular reflux and no JVD present. Carotid bruit is not present. No thyromegaly present.   Cardiovascular: Normal rate, regular rhythm, S1 normal, S2 normal and intact distal pulses. PMI is not displaced. Exam reveals no S3, no S4, no distant heart sounds, no friction rub, no midsystolic click and no opening snap.   No murmur heard.  Pulses:       Radial pulses are 2+ on the right side, and 2+ on the left side.   Pulmonary/Chest: Effort normal and breath sounds normal. He has no wheezes. He has no rales.   Abdominal: Soft. Bowel sounds are normal. He exhibits no distension, no abdominal bruit, no ascites and no mass. There is no tenderness.   Musculoskeletal: He exhibits no edema.   Neurological: He is alert and oriented to person, place, and time.   Skin: Skin is warm.   Psychiatric: He has a  normal mood and affect. His behavior is normal.   Nursing note and vitals reviewed.      Significant Labs:   BMP:   Recent Labs   Lab 05/29/19 0819   *      K 3.7      CO2 25   BUN 9   CREATININE 0.9   CALCIUM 10.2   , CMP   Recent Labs   Lab 05/29/19 0819      K 3.7      CO2 25   *   BUN 9   CREATININE 0.9   CALCIUM 10.2   PROT 6.6   ALBUMIN 3.8   BILITOT 0.6   ALKPHOS 80   AST 60*   ALT 16   ANIONGAP 9   ESTGFRAFRICA >60   EGFRNONAA >60   , CBC   Recent Labs   Lab 05/29/19 0819   WBC 13.94*   HGB 15.1   HCT 42.6      , INR   Recent Labs   Lab 05/29/19 0819   INR 0.9    and Troponin   Recent Labs   Lab 05/29/19 0819   TROPONINI 6.324*       Significant Imaging: Echocardiogram: 2D echo with color flow doppler: No results found for this or any previous visit.    Assessment and Plan:     NSTEMI  RECOMMEND IV HEPARIN GTT, ASA, METOPROLOL, ATORVASTATIN.  NPO  ECHO  IV FLUIDS  LHC TODAY.  INDICATIONS AND ALL RISKS/BENEFITS OF LHC AND POSSIBLE PCI DISCUSSED IN DETAIL WITH PT.  AGREEABLE TO PROCEEDING.  FURTHER RECS TO FOLLOW CATH.      AP (angina pectoris)  See management plan detailed above.     Abnormal ECG  See management plan detailed above.     NSTEMI (non-ST elevated myocardial infarction)  See management plan detailed above.         VTE Risk Mitigation (From admission, onward)        Ordered     heparin 25,000 units in dextrose 5% (100 units/ml) IV bolus from bag INITIAL BOLUS (max bolus 4000 units)  Once      05/29/19 0938     heparin 25,000 units in dextrose 5% 250 mL (100 units/mL) infusion LOW INTENSITY nomogram - [facility]  Continuous      05/29/19 0938     heparin 25,000 units in dextrose 5% (100 units/ml) IV bolus from bag - ADDITIONAL PRN BOLUS - 60 units/kg (max bolus 4000 units)  As needed (PRN)      05/29/19 0938     heparin 25,000 units in dextrose 5% (100 units/ml) IV bolus from bag - ADDITIONAL PRN BOLUS - 30 units/kg (max bolus 4000 units)  As needed (PRN)       05/29/19 0938          Thank you for your consult.    Reid Chase MD  Cardiology   [facility] -

## 2019-05-29 NOTE — HPI
Pt c/o chest pain, mid chest, all day yesterday which prompted him to go to ER overnight.  Cp much improved now, seems mostly resolved.  ecg shows NSR, low voltage, possible septal infarct.  Troponin 6.2 on initial labs.   .  No prior h/o CAD.  Active male with no significant health issues otherwise per pt.

## 2019-05-29 NOTE — H&P
[facility] Hospital Medicine  History & Physical    Patient Name: Chapin Calderon  MRN: [id number]  Admission Date: 5/29/2019  Attending Physician: Orville Montero MD   Primary Care Provider: Tiffanie Vicente MD         Patient information was obtained from patient, past medical records and ER records.     Subjective:     Principal Problem:NSTEMI (non-ST elevated myocardial infarction)    Chief Complaint:   Chief Complaint   Patient presents with    Chest Pain        HPI: Chapin Calderon is a 73 year old male with a past medical history of prostate cancer who presented to the ED with complaints of chest pain that began one day prior to presentation. The pain is described as a constant pressure in his left chest that rates a 6/10 on a pain scale. The patient denies radiation and associated symptoms including SOB, nausea, vomiting, diaphoresis and lower extremity edema. He tried Tums with no relieved. However, symptoms resolved shortly after receiving an Asprin in the ED. In the ED, the patient's initial troponin was 6.324 with an increase to 7.465. Code status was discussed with the patient. He is a full code. His wife, Daniela Calderon, is his surrogate medical decision maker.      Past Medical History:   Diagnosis Date    Prostate cancer        Past Surgical History:   Procedure Laterality Date    APPENDECTOMY         Review of patient's allergies indicates:   Allergen Reactions    Penicillins Rash       No current facility-administered medications on file prior to encounter.      Current Outpatient Medications on File Prior to Encounter   Medication Sig    naproxen (EC-NAPROSYN) 375 MG TbEC EC tablet Take 1 tablet (375 mg total) by mouth 2 (two) times daily with meals.     Family History     Reviewed and not pertinent.         Tobacco Use    Smoking status: Former Smoker    Smokeless tobacco: Never Used   Substance and Sexual Activity    Alcohol use: No    Drug use: No    Sexual activity:  Not on file     Review of Systems   Constitutional: Negative for appetite change, chills, diaphoresis, fatigue and fever.   HENT: Negative for congestion, ear pain, mouth sores, sore throat and trouble swallowing.    Eyes: Negative for pain and visual disturbance.   Respiratory: Negative for cough, chest tightness and shortness of breath.    Cardiovascular: Positive for chest pain. Negative for palpitations and leg swelling.   Gastrointestinal: Negative for abdominal pain, constipation, diarrhea and nausea.   Endocrine: Negative for cold intolerance, heat intolerance, polydipsia and polyuria.   Genitourinary: Negative for dysuria, frequency and hematuria.   Musculoskeletal: Negative for arthralgias, back pain, myalgias and neck pain.   Skin: Negative for pallor, rash and wound.   Allergic/Immunologic: Negative for environmental allergies and immunocompromised state.   Neurological: Negative for dizziness, seizures, syncope, weakness, numbness and headaches.   Hematological: Negative for adenopathy. Does not bruise/bleed easily.   Psychiatric/Behavioral: Negative for agitation, confusion and sleep disturbance.     Objective:     Vital Signs (Most Recent):  Temp: 98.9 °F (37.2 °C) (05/29/19 0750)  Pulse: 61 (05/29/19 1233)  Resp: (!) 22 (05/29/19 1233)  BP: (!) 144/72 (05/29/19 1041)  SpO2: 95 % (05/29/19 1233) Vital Signs (24h Range):  Temp:  [98.9 °F (37.2 °C)] 98.9 °F (37.2 °C)  Pulse:  [59-74] 61  Resp:  [14-22] 22  SpO2:  [95 %-97 %] 95 %  BP: (144-168)/(72-80) 144/72     Weight: 75.9 kg (167 lb 5.3 oz)  Body mass index is 26.21 kg/m².    Physical Exam   Constitutional: He is oriented to person, place, and time. He appears well-developed and well-nourished.   HENT:   Head: Normocephalic and atraumatic.   Eyes: Conjunctivae are normal.   Neck: Neck supple. No JVD present.   Cardiovascular: Normal rate, regular rhythm and normal heart sounds.   Pulmonary/Chest: Effort normal and breath sounds normal. He has no  wheezes.   Abdominal: Soft. Bowel sounds are normal. He exhibits no distension. There is no tenderness.   Musculoskeletal: Normal range of motion.   Neurological: He is alert and oriented to person, place, and time.   Skin: Skin is warm and dry. No rash noted.   Psychiatric: He has a normal mood and affect. His behavior is normal. Thought content normal.   Nursing note and vitals reviewed.          Significant Labs:   CBC:   Recent Labs   Lab 05/29/19 0819   WBC 13.94*   HGB 15.1   HCT 42.6        CMP:   Recent Labs   Lab 05/29/19 0819      K 3.7      CO2 25   *   BUN 9   CREATININE 0.9   CALCIUM 10.2   PROT 6.6   ALBUMIN 3.8   BILITOT 0.6   ALKPHOS 80   AST 60*   ALT 16   ANIONGAP 9   EGFRNONAA >60     Troponin:   Recent Labs   Lab 05/29/19 0819 05/29/19  1030   TROPONINI 6.324* 7.465*     All pertinent labs within the past 24 hours have been reviewed.    Significant Imaging: I have reviewed all pertinent imaging results/findings within the past 24 hours.   Imaging Results          IR Heart Cath Images (In process)                X-Ray Chest AP Portable (Final result)  Result time 05/29/19 08:37:15    Final result by Buddy Swanson MD (05/29/19 08:37:15)                 Impression:      No acute findings.      Electronically signed by: Buddy Swanson MD  Date:    05/29/2019  Time:    08:37             Narrative:    EXAMINATION:  XR CHEST AP PORTABLE    CLINICAL HISTORY:  Chest Pain;    TECHNIQUE:  AP view of the chest was performed.    COMPARISON:  None    FINDINGS:  The cardiac and mediastinal silhouettes appear within normal limits.   The lungs are clear bilaterally.  No acute osseous findings demonstrated.                                  Assessment/Plan:     * NSTEMI (non-ST elevated myocardial infarction)  -Continue ASA, heparin, metoprolol and Lipitor.   -No ACE/ARB due to hypotension.   -Echocardiogram and lipid panel pending.   -Patient was evaluated for Cardiac Rehab and is not a  candidate at this time.   -Cardiology is following with plans for [facility] today.       Leukocytosis  -Likely reactive.   -Continue to monitor for signs or symptoms of infection.       VTE Risk Mitigation (From admission, onward)        Ordered     heparin 25,000 units in dextrose 5% 250 mL (100 units/mL) infusion LOW INTENSITY nomogram - [facility]  Continuous      05/29/19 0938     heparin 25,000 units in dextrose 5% (100 units/ml) IV bolus from bag - ADDITIONAL PRN BOLUS - 60 units/kg (max bolus 4000 units)  As needed (PRN)      05/29/19 0938     heparin 25,000 units in dextrose 5% (100 units/ml) IV bolus from bag - ADDITIONAL PRN BOLUS - 30 units/kg (max bolus 4000 units)  As needed (PRN)      05/29/19 0938             GUILLE Narvaez  Department of Hospital Medicine   [facility] -

## 2019-05-29 NOTE — SUBJECTIVE & OBJECTIVE
Past Medical History:   Diagnosis Date    Prostate cancer        Past Surgical History:   Procedure Laterality Date    APPENDECTOMY         Review of patient's allergies indicates:   Allergen Reactions    Penicillins Rash       No current facility-administered medications on file prior to encounter.      Current Outpatient Medications on File Prior to Encounter   Medication Sig    naproxen (EC-NAPROSYN) 375 MG TbEC EC tablet Take 1 tablet (375 mg total) by mouth 2 (two) times daily with meals.     Family History     Reviewed and not pertinent.         Tobacco Use    Smoking status: Former Smoker    Smokeless tobacco: Never Used   Substance and Sexual Activity    Alcohol use: No    Drug use: No    Sexual activity: Not on file     Review of Systems   Constitutional: Negative for appetite change, chills, diaphoresis, fatigue and fever.   HENT: Negative for congestion, ear pain, mouth sores, sore throat and trouble swallowing.    Eyes: Negative for pain and visual disturbance.   Respiratory: Negative for cough, chest tightness and shortness of breath.    Cardiovascular: Positive for chest pain. Negative for palpitations and leg swelling.   Gastrointestinal: Negative for abdominal pain, constipation, diarrhea and nausea.   Endocrine: Negative for cold intolerance, heat intolerance, polydipsia and polyuria.   Genitourinary: Negative for dysuria, frequency and hematuria.   Musculoskeletal: Negative for arthralgias, back pain, myalgias and neck pain.   Skin: Negative for pallor, rash and wound.   Allergic/Immunologic: Negative for environmental allergies and immunocompromised state.   Neurological: Negative for dizziness, seizures, syncope, weakness, numbness and headaches.   Hematological: Negative for adenopathy. Does not bruise/bleed easily.   Psychiatric/Behavioral: Negative for agitation, confusion and sleep disturbance.     Objective:     Vital Signs (Most Recent):  Temp: 98.9 °F (37.2 °C) (05/29/19  0750)  Pulse: 61 (05/29/19 1233)  Resp: (!) 22 (05/29/19 1233)  BP: (!) 144/72 (05/29/19 1041)  SpO2: 95 % (05/29/19 1233) Vital Signs (24h Range):  Temp:  [98.9 °F (37.2 °C)] 98.9 °F (37.2 °C)  Pulse:  [59-74] 61  Resp:  [14-22] 22  SpO2:  [95 %-97 %] 95 %  BP: (144-168)/(72-80) 144/72     Weight: 75.9 kg (167 lb 5.3 oz)  Body mass index is 26.21 kg/m².    Physical Exam   Constitutional: He is oriented to person, place, and time. He appears well-developed and well-nourished.   HENT:   Head: Normocephalic and atraumatic.   Eyes: Conjunctivae are normal.   Neck: Neck supple. No JVD present.   Cardiovascular: Normal rate, regular rhythm and normal heart sounds.   Pulmonary/Chest: Effort normal and breath sounds normal. He has no wheezes.   Abdominal: Soft. Bowel sounds are normal. He exhibits no distension. There is no tenderness.   Musculoskeletal: Normal range of motion.   Neurological: He is alert and oriented to person, place, and time.   Skin: Skin is warm and dry. No rash noted.   Psychiatric: He has a normal mood and affect. His behavior is normal. Thought content normal.   Nursing note and vitals reviewed.          Significant Labs:   CBC:   Recent Labs   Lab 05/29/19  0819   WBC 13.94*   HGB 15.1   HCT 42.6        CMP:   Recent Labs   Lab 05/29/19  0819      K 3.7      CO2 25   *   BUN 9   CREATININE 0.9   CALCIUM 10.2   PROT 6.6   ALBUMIN 3.8   BILITOT 0.6   ALKPHOS 80   AST 60*   ALT 16   ANIONGAP 9   EGFRNONAA >60     Troponin:   Recent Labs   Lab 05/29/19  0819 05/29/19  1030   TROPONINI 6.324* 7.465*     All pertinent labs within the past 24 hours have been reviewed.    Significant Imaging: I have reviewed all pertinent imaging results/findings within the past 24 hours.   Imaging Results          IR Heart Cath Images (In process)                X-Ray Chest AP Portable (Final result)  Result time 05/29/19 08:37:15    Final result by Buddy Swanson MD (05/29/19 08:37:15)                  Impression:      No acute findings.      Electronically signed by: Buddy Swanson MD  Date:    05/29/2019  Time:    08:37             Narrative:    EXAMINATION:  XR CHEST AP PORTABLE    CLINICAL HISTORY:  Chest Pain;    TECHNIQUE:  AP view of the chest was performed.    COMPARISON:  None    FINDINGS:  The cardiac and mediastinal silhouettes appear within normal limits.   The lungs are clear bilaterally.  No acute osseous findings demonstrated.

## 2019-05-30 ENCOUNTER — ANESTHESIA EVENT (OUTPATIENT)
Dept: SURGERY | Facility: HOSPITAL | Age: 74
DRG: 234 | End: 2019-05-30
Payer: MEDICARE

## 2019-05-30 LAB
ABO + RH BLD: NORMAL
ALBUMIN SERPL BCP-MCNC: 3.5 G/DL (ref 3.5–5.2)
ALP SERPL-CCNC: 73 U/L (ref 55–135)
ALT SERPL W/O P-5'-P-CCNC: 13 U/L (ref 10–44)
ANION GAP SERPL CALC-SCNC: 6 MMOL/L (ref 8–16)
APTT BLDCRRT: 34.5 SEC (ref 21–32)
APTT BLDCRRT: 36.6 SEC (ref 21–32)
APTT BLDCRRT: 50.1 SEC (ref 21–32)
APTT BLDCRRT: 54.7 SEC (ref 21–32)
AST SERPL-CCNC: 39 U/L (ref 10–40)
BASOPHILS # BLD AUTO: 0.04 K/UL (ref 0–0.2)
BASOPHILS NFR BLD: 0.4 % (ref 0–1.9)
BILIRUB SERPL-MCNC: 0.6 MG/DL (ref 0.1–1)
BLD GP AB SCN CELLS X3 SERPL QL: NORMAL
BUN SERPL-MCNC: 11 MG/DL (ref 8–23)
CALCIUM SERPL-MCNC: 9.4 MG/DL (ref 8.7–10.5)
CHLORIDE SERPL-SCNC: 100 MMOL/L (ref 95–110)
CO2 SERPL-SCNC: 29 MMOL/L (ref 23–29)
CREAT SERPL-MCNC: 0.8 MG/DL (ref 0.5–1.4)
DIFFERENTIAL METHOD: ABNORMAL
EOSINOPHIL # BLD AUTO: 0.1 K/UL (ref 0–0.5)
EOSINOPHIL NFR BLD: 0.9 % (ref 0–8)
ERYTHROCYTE [DISTWIDTH] IN BLOOD BY AUTOMATED COUNT: 13.1 % (ref 11.5–14.5)
EST. GFR  (AFRICAN AMERICAN): >60 ML/MIN/1.73 M^2
EST. GFR  (NON AFRICAN AMERICAN): >60 ML/MIN/1.73 M^2
GLUCOSE SERPL-MCNC: 98 MG/DL (ref 70–110)
HCT VFR BLD AUTO: 39.8 % (ref 40–54)
HGB BLD-MCNC: 13.7 G/DL (ref 14–18)
INR PPP: 1 (ref 0.8–1.2)
LYMPHOCYTES # BLD AUTO: 1.7 K/UL (ref 1–4.8)
LYMPHOCYTES NFR BLD: 15.1 % (ref 18–48)
MAGNESIUM SERPL-MCNC: 1.9 MG/DL (ref 1.6–2.6)
MCH RBC QN AUTO: 29.8 PG (ref 27–31)
MCHC RBC AUTO-ENTMCNC: 34.4 G/DL (ref 32–36)
MCV RBC AUTO: 87 FL (ref 82–98)
MONOCYTES # BLD AUTO: 1.4 K/UL (ref 0.3–1)
MONOCYTES NFR BLD: 12.4 % (ref 4–15)
NEUTROPHILS # BLD AUTO: 7.8 K/UL (ref 1.8–7.7)
NEUTROPHILS NFR BLD: 71.2 % (ref 38–73)
PHOSPHATE SERPL-MCNC: 3 MG/DL (ref 2.7–4.5)
PLATELET # BLD AUTO: 211 K/UL (ref 150–350)
PMV BLD AUTO: 9.8 FL (ref 9.2–12.9)
POTASSIUM SERPL-SCNC: 4.5 MMOL/L (ref 3.5–5.1)
PREALB SERPL-MCNC: 19 MG/DL (ref 20–43)
PROT SERPL-MCNC: 6.1 G/DL (ref 6–8.4)
PROTHROMBIN TIME: 10.7 SEC (ref 9–12.5)
RBC # BLD AUTO: 4.59 M/UL (ref 4.6–6.2)
SODIUM SERPL-SCNC: 135 MMOL/L (ref 136–145)
WBC # BLD AUTO: 10.99 K/UL (ref 3.9–12.7)

## 2019-05-30 PROCEDURE — 99233 SBSQ HOSP IP/OBS HIGH 50: CPT | Mod: ,,, | Performed by: INTERNAL MEDICINE

## 2019-05-30 PROCEDURE — 94799 UNLISTED PULMONARY SVC/PX: CPT

## 2019-05-30 PROCEDURE — 85730 THROMBOPLASTIN TIME PARTIAL: CPT

## 2019-05-30 PROCEDURE — 80053 COMPREHEN METABOLIC PANEL: CPT

## 2019-05-30 PROCEDURE — 36415 COLL VENOUS BLD VENIPUNCTURE: CPT

## 2019-05-30 PROCEDURE — 84100 ASSAY OF PHOSPHORUS: CPT

## 2019-05-30 PROCEDURE — 99900035 HC TECH TIME PER 15 MIN (STAT)

## 2019-05-30 PROCEDURE — 94010 BREATHING CAPACITY TEST: CPT

## 2019-05-30 PROCEDURE — 94760 N-INVAS EAR/PLS OXIMETRY 1: CPT

## 2019-05-30 PROCEDURE — 86850 RBC ANTIBODY SCREEN: CPT

## 2019-05-30 PROCEDURE — 25000003 PHARM REV CODE 250: Performed by: INTERNAL MEDICINE

## 2019-05-30 PROCEDURE — 85025 COMPLETE CBC W/AUTO DIFF WBC: CPT

## 2019-05-30 PROCEDURE — 84134 ASSAY OF PREALBUMIN: CPT

## 2019-05-30 PROCEDURE — 83735 ASSAY OF MAGNESIUM: CPT

## 2019-05-30 PROCEDURE — 63600175 PHARM REV CODE 636 W HCPCS: Performed by: INTERNAL MEDICINE

## 2019-05-30 PROCEDURE — 85610 PROTHROMBIN TIME: CPT

## 2019-05-30 PROCEDURE — 21400001 HC TELEMETRY ROOM

## 2019-05-30 PROCEDURE — 83036 HEMOGLOBIN GLYCOSYLATED A1C: CPT

## 2019-05-30 PROCEDURE — 85730 THROMBOPLASTIN TIME PARTIAL: CPT | Mod: 91

## 2019-05-30 PROCEDURE — 99233 PR SUBSEQUENT HOSPITAL CARE,LEVL III: ICD-10-PCS | Mod: ,,, | Performed by: INTERNAL MEDICINE

## 2019-05-30 RX ORDER — METOPROLOL TARTRATE 25 MG/1
25 TABLET, FILM COATED ORAL
Status: DISCONTINUED | OUTPATIENT
Start: 2019-05-31 | End: 2019-06-04

## 2019-05-30 RX ORDER — METOPROLOL TARTRATE 25 MG/1
25 TABLET, FILM COATED ORAL
Status: DISCONTINUED | OUTPATIENT
Start: 2019-05-30 | End: 2019-05-30

## 2019-05-30 RX ADMIN — ATORVASTATIN CALCIUM 80 MG: 40 TABLET, FILM COATED ORAL at 08:05

## 2019-05-30 RX ADMIN — METOPROLOL TARTRATE 25 MG: 25 TABLET ORAL at 08:05

## 2019-05-30 RX ADMIN — ASPIRIN 81 MG: 81 TABLET, COATED ORAL at 08:05

## 2019-05-30 NOTE — HOSPITAL COURSE
5/30/19--Patient seen and examined in room, lying in bed. Denies chest pain or SOB today. IV heparin gtt in place. Plans for CABG tomorrow per CT surgery. Labs reviewed, K+ 4.5, Cr 0.8, Mag 1.9.     5/31/19--Patient seen and examined in ICU, intubated and sedated post CABG. CABG x 5V per Dr. Dorado today. Labs reviewed, INR 1.2, K+ 4.2, Cr 1.0, Mag 3.8, H/H 9/25.1.     6/1/19:  PT SEEN AND EXAMINED IN ICU.  EXTUBATED.  NO ANGINA.  USUAL POST OP DISCOMFORT.  NO DYSPNEA.  LABS SHOWS SEVERE POST OP ANEMIA.  RECEIVING PRBC THIS AM.    6/2/19:  PT SEEN AND EXAMINED IN ICU.  NO ACUTE CV ISSUES NOTED.  CHEST TUBES BEING PULLED THIS AM.  HG IMPROVED. NO ACTIVE ANGINA OR CHF SXS. APPROPRIATE POST OP PAIN.    6/3/19-Patient seen and examined today. Feels well. Ambulated this AM. No dmitry chest pain. Labs reviewed, WBC elevated.     6/4/19-Patient seen and examined today. Uneventful night. No complaints. Labs stable.     6/5/19-Patient seen and examined today, resting in bed. Stable CV wise. No chest pain or SOB. Labs reviewed and are stable.

## 2019-05-30 NOTE — PLAN OF CARE
Problem: Adult Inpatient Plan of Care  Goal: Readiness for Transition of Care    Intervention: Mutually Develop Transition Plan     05/30/19 1439 05/30/19 1451   (RETIRED) Discharge Needs Assessment   How many people do you have in your home that can help with your care after discharge?  --  1   OTHER   Communicated expected length of stay with patient/caregiver yes  --    Is patient able to care for self after discharge? Yes  --    Who are your caregiver(s) and their phone number(s)? Daniela Calderon ( spouse ) [phone]  --    Patient currently receives home health services?  --  No   Discharge Needs Assessment   Readmission Within the Last 30 Days no previous admission in last 30 days  --    Equipment Currently Used at Home none  --    Transportation Anticipated family or friend will provide  --    Social Work Plan   Patient/Family in Agreement with Plan yes  --    Living Environment   Able to Return to Prior Arrangements yes  --    (RETIRED) Current Health   Expected Length of Stay (days)   (tbd)  --    (RETIRED) Social Work Plan   Patient's perception of discharge disposition home or selfcare;home health  --

## 2019-05-30 NOTE — PROGRESS NOTES
[facility] -   Cardiology  Progress Note    Patient Name: Chapin Calderon  MRN: [id number]  Admission Date: 5/29/2019  Hospital Length of Stay: 1 days  Code Status: Full Code   Attending Physician: Orville Montero MD   Primary Care Physician: Tiffanie Vicente MD  Expected Discharge Date:   Principal Problem:NSTEMI (non-ST elevated myocardial infarction)    Subjective:   HPI    Pt c/o chest pain, mid chest, all day yesterday which prompted him to go to ER overnight.  Cp much improved now, seems mostly resolved.  ecg shows NSR, low voltage, possible septal infarct.  Troponin 6.2 on initial labs.   .  No prior h/o CAD.  Active male with no significant health issues otherwise per pt.      Hospital Course:   5/30/19--Patient seen and examined in room, lying in bed. Denies chest pain or SOB today. IV heparin gtt in place. Plans for CABG tomorrow per CT surgery. Labs reviewed, K+ 4.5, Cr 0.8, Mag 1.9.     Interval History: no acute issues noted o/n. Plans for CABG tomorrow per CT surgery.     Review of Systems   Constitution: Negative for malaise/fatigue.   HENT: Negative for hearing loss and hoarse voice.    Eyes: Negative for blurred vision and visual disturbance.   Cardiovascular: Positive for chest pain (resolved). Negative for claudication, dyspnea on exertion, irregular heartbeat, leg swelling, near-syncope, orthopnea, palpitations, paroxysmal nocturnal dyspnea and syncope.   Respiratory: Negative for cough, hemoptysis, shortness of breath, sleep disturbances due to breathing, snoring and wheezing.    Endocrine: Negative for cold intolerance and heat intolerance.   Hematologic/Lymphatic: Bruises/bleeds easily (IV heparin gtt).   Skin: Negative for color change, dry skin and nail changes.   Musculoskeletal: Positive for arthritis. Negative for back pain, joint pain and myalgias.   Gastrointestinal: Negative for bloating, abdominal pain, constipation, nausea and vomiting.   Genitourinary:  Negative for dysuria, flank pain, hematuria and hesitancy.   Neurological: Negative for headaches, light-headedness, loss of balance, numbness, paresthesias and weakness.   Psychiatric/Behavioral: Negative for altered mental status.   Allergic/Immunologic: Negative for environmental allergies.     Objective:     Vital Signs (Most Recent):  Temp: 98.9 °F (37.2 °C) (05/30/19 0730)  Pulse: (!) 59 (05/30/19 0900)  Resp: 19 (05/30/19 0730)  BP: (!) 118/58 (05/30/19 0730)  SpO2: 95 % (05/30/19 0800) Vital Signs (24h Range):  Temp:  [97.6 °F (36.4 °C)-100 °F (37.8 °C)] 98.9 °F (37.2 °C)  Pulse:  [55-65] 59  Resp:  [14-22] 19  SpO2:  [93 %-98 %] 95 %  BP: (108-126)/(54-65) 118/58     Weight: 75.9 kg (167 lb 5.3 oz)  Body mass index is 26.21 kg/m².     SpO2: 95 %  O2 Device (Oxygen Therapy): room air      Intake/Output Summary (Last 24 hours) at 5/30/2019 1050  Last data filed at 5/30/2019 0613  Gross per 24 hour   Intake 0.33 ml   Output --   Net 0.33 ml       Lines/Drains/Airways     Peripheral Intravenous Line                 Peripheral IV - Single Lumen 05/29/19 0820 20 G Left Forearm 1 day                Physical Exam   Constitutional: He is oriented to person, place, and time. He appears well-developed and well-nourished. No distress.   HENT:   Head: Normocephalic and atraumatic.   Eyes: Pupils are equal, round, and reactive to light.   Neck: Normal range of motion and full passive range of motion without pain. Neck supple. No JVD present.   Cardiovascular: Regular rhythm, S1 normal, S2 normal and intact distal pulses. Bradycardia present. PMI is not displaced. Exam reveals no distant heart sounds.   No murmur heard.  Pulses:       Radial pulses are 2+ on the right side, and 2+ on the left side.        Dorsalis pedis pulses are 2+ on the right side, and 2+ on the left side.   Left radial access site C/D/I, no hematoma or ecchymosis noted. No drainage or signs of infection noted today.     Left radial pulse +2.    Pulmonary/Chest: Effort normal and breath sounds normal. No accessory muscle usage. No respiratory distress. He has no decreased breath sounds. He has no wheezes. He has no rales.   Abdominal: Soft. Bowel sounds are normal. He exhibits no distension. There is no tenderness.   Musculoskeletal: Normal range of motion. He exhibits no edema.        Right ankle: He exhibits no swelling.        Left ankle: He exhibits no swelling.   Neurological: He is alert and oriented to person, place, and time.   Skin: Skin is warm and dry. He is not diaphoretic. No cyanosis. Nails show no clubbing.   Psychiatric: He has a normal mood and affect. His speech is normal and behavior is normal. Judgment and thought content normal. Cognition and memory are normal.   Nursing note and vitals reviewed.      Significant Labs:   BMP:   Recent Labs   Lab 05/29/19 0819 05/30/19  0422   * 98    135*   K 3.7 4.5    100   CO2 25 29   BUN 9 11   CREATININE 0.9 0.8   CALCIUM 10.2 9.4   MG  --  1.9   , CBC   Recent Labs   Lab 05/29/19 0819 05/30/19  0422   WBC 13.94* 10.99   HGB 15.1 13.7*   HCT 42.6 39.8*    211   , Lipid Panel   Recent Labs   Lab 05/29/19  1030   CHOL 194   HDL 42   LDLCALC 134.0   TRIG 90   CHOLHDL 21.6   , Troponin   Recent Labs   Lab 05/29/19 0819 05/29/19  1030   TROPONINI 6.324* 7.465*    and All pertinent lab results from the last 24 hours have been reviewed.    Significant Imaging: Echocardiogram:   2D echo with color flow doppler:   Results for orders placed or performed during the hospital encounter of 05/29/19   2D echo with color flow doppler   Result Value Ref Range    QEF 60 55 - 65    Diastolic Dysfunction No     Est. PA Systolic Pressure 30.46     and X-Ray: CXR: X-Ray Chest 1 View (CXR): No results found for this visit on 05/29/19.    Assessment and Plan:       * NSTEMI (non-ST elevated myocardial infarction)   5/30/19  -C completed per Dr. Chase which revealed severe multivessel CAD  and CT surgery consulted  -Plans for CABG per Dr. Dorado tomorrow in place  -Continue ASA, Statin, IV heparin gtt, BB  -Unable to add ACEi/ARB for now given soft BP, will attempt pending clinical course  -ECHO revealed EF 60-65%      Coronary artery disease involving native coronary artery of native heart with angina pectoris  See plan for NSTEMI    AP (angina pectoris)  See management plan detailed above.     Abnormal ECG  See management plan detailed above.         VTE Risk Mitigation (From admission, onward)        Ordered     IP VTE LOW RISK PATIENT  Once      05/30/19 0752     Place SKY hose  Until discontinued      05/30/19 0752     Place sequential compression device  Until discontinued      05/30/19 0752     heparin 25,000 units in dextrose 5% 250 mL (100 units/mL) infusion LOW INTENSITY nomogram - [facility]  Continuous      05/29/19 2001     Reason for no Mechanical VTE Prophylaxis  Once      05/29/19 1431     heparin 25,000 units in dextrose 5% (100 units/ml) IV bolus from bag - ADDITIONAL PRN BOLUS - 60 units/kg (max bolus 4000 units)  As needed (PRN)      05/29/19 0938     heparin 25,000 units in dextrose 5% (100 units/ml) IV bolus from bag - ADDITIONAL PRN BOLUS - 30 units/kg (max bolus 4000 units)  As needed (PRN)      05/29/19 0938          REMEDIOS Goss  Cardiology  [facility]

## 2019-05-30 NOTE — PROGRESS NOTES
[facility] Hospital Medicine  Progress Note    Patient Name: Chapin Calderon  MRN: [id number]  Patient Class: IP- Inpatient   Admission Date: 5/29/2019  Length of Stay: 1 days  Attending Physician: Orville Montero MD  Primary Care Provider: Tiffanie Vicente MD        Subjective:     Principal Problem:NSTEMI (non-ST elevated myocardial infarction)    HPI:  Chapin Calderon is a 73 year old male with a past medical history of prostate cancer who presented to the ED with complaints of chest pain that began one day prior to presentation. The pain is described as a constant pressure in his left chest that rates a 6/10 on a pain scale. The patient denies radiation and associated symptoms including SOB, nausea, vomiting, diaphoresis and lower extremity edema. He tried Tums with no relieved. However, symptoms resolved shortly after receiving an Asprin in the ED. In the ED, the patient's initial troponin was 6.324 with an increase to 7.465. Code status was discussed with the patient. He is a full code. His wife, Daniela Calderon, is his surrogate medical decision maker.      Hospital Course:  C revealed multi-vessel disease.  Evaluation by CV Surgery with a recommendation for CABG.  Plan for CABG 31 May.    Interval History:  No acute problems or complaints.  Chest tightness resolved.    Review of Systems   Constitutional: Negative.  Negative for chills and fever.   HENT: Negative.  Negative for congestion and sore throat.    Eyes: Negative.  Negative for visual disturbance.   Respiratory: Negative.  Negative for cough, shortness of breath and wheezing.    Cardiovascular: Negative.  Negative for chest pain.   Gastrointestinal: Negative for abdominal pain, diarrhea, nausea and vomiting.   Endocrine: Negative.    Genitourinary: Negative.    Musculoskeletal: Negative.  Negative for myalgias and neck stiffness.   Skin: Negative.  Negative for color change and pallor.   Allergic/Immunologic: Negative.     Neurological: Negative.    Hematological: Negative.    Psychiatric/Behavioral: Negative.    All other systems reviewed and are negative.    Objective:     Vital Signs (Most Recent):  Temp: 98.9 °F (37.2 °C) (05/30/19 1625)  Pulse: (!) 57 (05/30/19 1625)  Resp: 19 (05/30/19 0730)  BP: 128/65 (05/30/19 1625)  SpO2: 96 % (05/30/19 1625) Vital Signs (24h Range):  Temp:  [98.6 °F (37 °C)-100 °F (37.8 °C)] 98.9 °F (37.2 °C)  Pulse:  [54-65] 57  Resp:  [18-19] 19  SpO2:  [93 %-97 %] 96 %  BP: (108-131)/(54-65) 128/65     Weight: 75.9 kg (167 lb 5.3 oz)  Body mass index is 26.21 kg/m².    Intake/Output Summary (Last 24 hours) at 5/30/2019 1736  Last data filed at 5/30/2019 1600  Gross per 24 hour   Intake 240.33 ml   Output 400 ml   Net -159.67 ml      Physical Exam   Constitutional: He is oriented to person, place, and time. He appears well-developed and well-nourished. No distress.   HENT:   Head: Normocephalic and atraumatic.   Mouth/Throat: Oropharynx is clear and moist.   Eyes: Pupils are equal, round, and reactive to light. Conjunctivae and EOM are normal.   Neck: No JVD present. No thyromegaly present.   Cardiovascular: Normal rate, regular rhythm and normal heart sounds. Exam reveals no gallop and no friction rub.   No murmur heard.  Pulmonary/Chest: Effort normal and breath sounds normal. No respiratory distress. He has no wheezes. He has no rales.   Abdominal: Soft. Bowel sounds are normal. He exhibits no distension. There is no tenderness. There is no rebound and no guarding.   Musculoskeletal: Normal range of motion. He exhibits no edema or tenderness.   Lymphadenopathy:     He has no cervical adenopathy.   Neurological: He is alert and oriented to person, place, and time. He has normal reflexes. He displays normal reflexes. No cranial nerve deficit.   Skin: Skin is warm and dry. No rash noted. He is not diaphoretic. No erythema.   Psychiatric: He has a normal mood and affect. His behavior is normal. Judgment  and thought content normal.       Significant Labs: All pertinent labs within the past 24 hours have been reviewed.    Significant Imaging: I have reviewed all pertinent imaging results/findings within the past 24 hours.    Assessment/Plan:      * NSTEMI (non-ST elevated myocardial infarction)  -Continue ASA, heparin, metoprolol and Lipitor.   -Echocardiogram and lipid panel pending.   -Patient was evaluated for Cardiac Rehab and is not a candidate at this time.   -LHC revealed multi-vessel disease.  CV Surgery plan for CABG 31 May.    Leukocytosis  -Likely reactive.   -Continue to monitor for signs or symptoms of infection.         VTE Risk Mitigation (From admission, onward)        Ordered     IP VTE LOW RISK PATIENT  Once      05/30/19 0752     Place SKY hose  Until discontinued      05/30/19 0752     Place sequential compression device  Until discontinued      05/30/19 0752     heparin 25,000 units in dextrose 5% 250 mL (100 units/mL) infusion LOW INTENSITY nomogram - [facility]  Continuous      05/29/19 2001     Reason for no Mechanical VTE Prophylaxis  Once      05/29/19 1431     heparin 25,000 units in dextrose 5% (100 units/ml) IV bolus from bag - ADDITIONAL PRN BOLUS - 60 units/kg (max bolus 4000 units)  As needed (PRN)      05/29/19 0938     heparin 25,000 units in dextrose 5% (100 units/ml) IV bolus from bag - ADDITIONAL PRN BOLUS - 30 units/kg (max bolus 4000 units)  As needed (PRN)      05/29/19 0938              Orville Montero MD  Department of Hospital Medicine   [facility]

## 2019-05-30 NOTE — HOSPITAL COURSE
LHC revealed multi-vessel disease.  Evaluation by CV Surgery with a recommendation for CABG.  Successful CABG 31 May.  Post-op in ICU.    06/01- 73 year old man s/p -status-post coronary artery bypass grafting x 5. He was seen with wife in the room.  CBC showed-  Component      Latest Ref Rng & Units 6/1/2019 6/1/2019 6/1/2019 5/31/2019           6:10 PM  2:15 PM  3:45 AM  9:17 PM   WBC      3.90 - 12.70 K/uL 21.73 (H) 21.37 (H) 14.56 (H) 17.74 (H)      06/02-  73 year old man s/p CABG X5 , he denies fever or chest pain.  Wbc is 9.7  06/03- remains weak, afebrile , wbc -17, CVT on board

## 2019-05-30 NOTE — SUBJECTIVE & OBJECTIVE
Interval History:  No acute problems or complaints.  Chest tightness resolved.    Review of Systems   Constitutional: Negative.  Negative for chills and fever.   HENT: Negative.  Negative for congestion and sore throat.    Eyes: Negative.  Negative for visual disturbance.   Respiratory: Negative.  Negative for cough, shortness of breath and wheezing.    Cardiovascular: Negative.  Negative for chest pain.   Gastrointestinal: Negative for abdominal pain, diarrhea, nausea and vomiting.   Endocrine: Negative.    Genitourinary: Negative.    Musculoskeletal: Negative.  Negative for myalgias and neck stiffness.   Skin: Negative.  Negative for color change and pallor.   Allergic/Immunologic: Negative.    Neurological: Negative.    Hematological: Negative.    Psychiatric/Behavioral: Negative.    All other systems reviewed and are negative.    Objective:     Vital Signs (Most Recent):  Temp: 98.9 °F (37.2 °C) (05/30/19 1625)  Pulse: (!) 57 (05/30/19 1625)  Resp: 19 (05/30/19 0730)  BP: 128/65 (05/30/19 1625)  SpO2: 96 % (05/30/19 1625) Vital Signs (24h Range):  Temp:  [98.6 °F (37 °C)-100 °F (37.8 °C)] 98.9 °F (37.2 °C)  Pulse:  [54-65] 57  Resp:  [18-19] 19  SpO2:  [93 %-97 %] 96 %  BP: (108-131)/(54-65) 128/65     Weight: 75.9 kg (167 lb 5.3 oz)  Body mass index is 26.21 kg/m².    Intake/Output Summary (Last 24 hours) at 5/30/2019 1736  Last data filed at 5/30/2019 1600  Gross per 24 hour   Intake 240.33 ml   Output 400 ml   Net -159.67 ml      Physical Exam   Constitutional: He is oriented to person, place, and time. He appears well-developed and well-nourished. No distress.   HENT:   Head: Normocephalic and atraumatic.   Mouth/Throat: Oropharynx is clear and moist.   Eyes: Pupils are equal, round, and reactive to light. Conjunctivae and EOM are normal.   Neck: No JVD present. No thyromegaly present.   Cardiovascular: Normal rate, regular rhythm and normal heart sounds. Exam reveals no gallop and no friction rub.   No  murmur heard.  Pulmonary/Chest: Effort normal and breath sounds normal. No respiratory distress. He has no wheezes. He has no rales.   Abdominal: Soft. Bowel sounds are normal. He exhibits no distension. There is no tenderness. There is no rebound and no guarding.   Musculoskeletal: Normal range of motion. He exhibits no edema or tenderness.   Lymphadenopathy:     He has no cervical adenopathy.   Neurological: He is alert and oriented to person, place, and time. He has normal reflexes. He displays normal reflexes. No cranial nerve deficit.   Skin: Skin is warm and dry. No rash noted. He is not diaphoretic. No erythema.   Psychiatric: He has a normal mood and affect. His behavior is normal. Judgment and thought content normal.       Significant Labs: All pertinent labs within the past 24 hours have been reviewed.    Significant Imaging: I have reviewed all pertinent imaging results/findings within the past 24 hours.

## 2019-05-30 NOTE — PLAN OF CARE
Problem: Adult Inpatient Plan of Care  Goal: Plan of Care Review  Outcome: Ongoing (interventions implemented as appropriate)  POC reviewed with patient, and pt wife they both verbalized understanding. Pt remains free from falls. Fall precautions in place, hourly rounding with a purpose done. Call bell within reach and pt reminded to call for assistance.  NS to SB on cardiac monitor. VSS. Pt has heparin drip infusing , which it is not therapeutic at this time. Will continue to monitor levels. Neurovascular checks to pt LW performed Q4H. Armboard still in place. DSG clean , dry , and intact. Pt denies any pain ,numbness or tingling. No other complaints at this time. Pt aware that he will be having surgery tomorrow. Plan was discussed with pt and pt wife and they both verbalized understanding.

## 2019-05-30 NOTE — PROGRESS NOTES
Clinical Pharmacy Progress Note: Telemetry Pharmacist Rounding     Patient was counseled by pharmacist on the telemetry pharmacist availability to discuss new medications, side effects, and reasons for changes in medication during admission.   Asked if patient had any medication questions at this time.   Instructed patient to use call light with any questions or concerns to discuss with pharmacy during the admission.   Offered bedside medication delivery to patient.   Patient expressed understanding and had no further questions.    Thank you for allowing us to participate in this patient's care.    Dee Corbin, PharmD 5/30/2019 2:04 PM

## 2019-05-30 NOTE — PLAN OF CARE
Met with patient and family, initial assessment completed. Patient is independent with adls and iadls.  Patient denies any post hospital needs or services at this time. Discussed possibly requiring home health with therapy after his surgery. Patient is to have a CABG tomorrow. Patient and family stated that while in ICU patient's wife will be staying in his hospital room. I explained that once patient is assigned an ICU room the telemetry staff would need to release room 251 for another patient to use. But that on occasion patient's may be able to sleep in one of the rooms on 5th floor. Spoke with Rain meek nurse, she will request a room for patient's family to room in while patient is in the ICU. CM will continue to follow.  Updated white board with 's name and number. Transitional Care Folder, Discharge Planning Begins on Admission pamphlet, [facility] Bed[facility], Advance Directive information given to patient along with the contact information given.Instructed patient or family to call with any questions or concerns.    Discussed accessing the [employer] via the [employer] Instant Activation. Patient agreed. Confirmed telephone number. Activation link sent.          [employer] [id number] [place] - [address] AT [address] OF [facility] [address]  [address]  [place] [address]  Phone: [phone] Fax: [phone]    Tiffanie Vicente MD  Payor: MEDICARE / Plan: MEDICARE PART A & B / Product Type: [facility] /             05/30/19 1439   Discharge Assessment   Assessment Type Discharge Planning Assessment   Confirmed/corrected address and phone number on facesheet? Yes   Assessment information obtained from? Patient;Caregiver;Medical Record   Expected Length of Stay (days)   (tbd)   Communicated expected length of stay with patient/caregiver yes   Prior to hospitilization cognitive status: Alert/Oriented   Prior to hospitalization functional  status: Independent   Current cognitive status: Alert/Oriented   Current Functional Status: Independent   Facility Arrived From: home   Lives With spouse   Able to Return to Prior Arrangements yes   Is patient able to care for self after discharge? Yes   Who are your caregiver(s) and their phone number(s)? Daniela Calderon ( spouse ) [phone]   Patient's perception of discharge disposition home or selfcare;home health   Readmission Within the Last 30 Days no previous admission in last 30 days   Patient currently being followed by outpatient case management? No   Patient currently receives any other outside agency services? No   Equipment Currently Used at Home none   Do you have any problems affording any of your prescribed medications? No   Is the patient taking medications as prescribed? yes   Does the patient have transportation home? Yes   Transportation Anticipated family or friend will provide   Does the patient receive services at the Coumadin Clinic? No   Discharge Plan A Home;Home with family;Home Health   Discharge Plan B Home;Home with family   DME Needed Upon Discharge  none   Patient/Family in Agreement with Plan yes

## 2019-05-30 NOTE — ASSESSMENT & PLAN NOTE
5/30/19  -LHC completed per Dr. Chase which revealed severe multivessel CAD and CT surgery consulted  -Plans for CABG per Dr. Dorado tomorrow in place  -Continue ASA, Statin, IV heparin gtt, BB  -Unable to add ACEi/ARB for now given soft BP, will attempt pending clinical course  -ECHO revealed EF 60-65%

## 2019-05-30 NOTE — SUBJECTIVE & OBJECTIVE
Interval History: no acute issues noted o/n. Plans for CABG tomorrow per CT surgery.     Review of Systems   Constitution: Negative for malaise/fatigue.   HENT: Negative for hearing loss and hoarse voice.    Eyes: Negative for blurred vision and visual disturbance.   Cardiovascular: Positive for chest pain (resolved). Negative for claudication, dyspnea on exertion, irregular heartbeat, leg swelling, near-syncope, orthopnea, palpitations, paroxysmal nocturnal dyspnea and syncope.   Respiratory: Negative for cough, hemoptysis, shortness of breath, sleep disturbances due to breathing, snoring and wheezing.    Endocrine: Negative for cold intolerance and heat intolerance.   Hematologic/Lymphatic: Bruises/bleeds easily (IV heparin gtt).   Skin: Negative for color change, dry skin and nail changes.   Musculoskeletal: Positive for arthritis. Negative for back pain, joint pain and myalgias.   Gastrointestinal: Negative for bloating, abdominal pain, constipation, nausea and vomiting.   Genitourinary: Negative for dysuria, flank pain, hematuria and hesitancy.   Neurological: Negative for headaches, light-headedness, loss of balance, numbness, paresthesias and weakness.   Psychiatric/Behavioral: Negative for altered mental status.   Allergic/Immunologic: Negative for environmental allergies.     Objective:     Vital Signs (Most Recent):  Temp: 98.9 °F (37.2 °C) (05/30/19 0730)  Pulse: (!) 59 (05/30/19 0900)  Resp: 19 (05/30/19 0730)  BP: (!) 118/58 (05/30/19 0730)  SpO2: 95 % (05/30/19 0800) Vital Signs (24h Range):  Temp:  [97.6 °F (36.4 °C)-100 °F (37.8 °C)] 98.9 °F (37.2 °C)  Pulse:  [55-65] 59  Resp:  [14-22] 19  SpO2:  [93 %-98 %] 95 %  BP: (108-126)/(54-65) 118/58     Weight: 75.9 kg (167 lb 5.3 oz)  Body mass index is 26.21 kg/m².     SpO2: 95 %  O2 Device (Oxygen Therapy): room air      Intake/Output Summary (Last 24 hours) at 5/30/2019 1050  Last data filed at 5/30/2019 0613  Gross per 24 hour   Intake 0.33 ml    Output --   Net 0.33 ml       Lines/Drains/Airways     Peripheral Intravenous Line                 Peripheral IV - Single Lumen 05/29/19 0820 20 G Left Forearm 1 day                Physical Exam   Constitutional: He is oriented to person, place, and time. He appears well-developed and well-nourished. No distress.   HENT:   Head: Normocephalic and atraumatic.   Eyes: Pupils are equal, round, and reactive to light.   Neck: Normal range of motion and full passive range of motion without pain. Neck supple. No JVD present.   Cardiovascular: Regular rhythm, S1 normal, S2 normal and intact distal pulses. Bradycardia present. PMI is not displaced. Exam reveals no distant heart sounds.   No murmur heard.  Pulses:       Radial pulses are 2+ on the right side, and 2+ on the left side.        Dorsalis pedis pulses are 2+ on the right side, and 2+ on the left side.   Left radial access site C/D/I, no hematoma or ecchymosis noted. No drainage or signs of infection noted today.     Left radial pulse +2.   Pulmonary/Chest: Effort normal and breath sounds normal. No accessory muscle usage. No respiratory distress. He has no decreased breath sounds. He has no wheezes. He has no rales.   Abdominal: Soft. Bowel sounds are normal. He exhibits no distension. There is no tenderness.   Musculoskeletal: Normal range of motion. He exhibits no edema.        Right ankle: He exhibits no swelling.        Left ankle: He exhibits no swelling.   Neurological: He is alert and oriented to person, place, and time.   Skin: Skin is warm and dry. He is not diaphoretic. No cyanosis. Nails show no clubbing.   Psychiatric: He has a normal mood and affect. His speech is normal and behavior is normal. Judgment and thought content normal. Cognition and memory are normal.   Nursing note and vitals reviewed.      Significant Labs:   BMP:   Recent Labs   Lab 05/29/19  0819 05/30/19  0422   * 98    135*   K 3.7 4.5    100   CO2 25 29   BUN 9  11   CREATININE 0.9 0.8   CALCIUM 10.2 9.4   MG  --  1.9   , CBC   Recent Labs   Lab 05/29/19  0819 05/30/19  0422   WBC 13.94* 10.99   HGB 15.1 13.7*   HCT 42.6 39.8*    211   , Lipid Panel   Recent Labs   Lab 05/29/19  1030   CHOL 194   HDL 42   LDLCALC 134.0   TRIG 90   CHOLHDL 21.6   , Troponin   Recent Labs   Lab 05/29/19  0819 05/29/19  1030   TROPONINI 6.324* 7.465*    and All pertinent lab results from the last 24 hours have been reviewed.    Significant Imaging: Echocardiogram:   2D echo with color flow doppler:   Results for orders placed or performed during the hospital encounter of 05/29/19   2D echo with color flow doppler   Result Value Ref Range    QEF 60 55 - 65    Diastolic Dysfunction No     Est. PA Systolic Pressure 30.46     and X-Ray: CXR: X-Ray Chest 1 View (CXR): No results found for this visit on 05/29/19.

## 2019-05-31 ENCOUNTER — ANESTHESIA (OUTPATIENT)
Dept: SURGERY | Facility: HOSPITAL | Age: 74
DRG: 234 | End: 2019-05-31
Payer: MEDICARE

## 2019-05-31 PROBLEM — Z95.1 S/P CABG (CORONARY ARTERY BYPASS GRAFT): Status: ACTIVE | Noted: 2019-05-31

## 2019-05-31 PROBLEM — Z99.11 ON MECHANICALLY ASSISTED VENTILATION: Status: ACTIVE | Noted: 2019-05-31

## 2019-05-31 LAB
ALBUMIN SERPL BCP-MCNC: 3.6 G/DL (ref 3.5–5.2)
ALLENS TEST: ABNORMAL
ALP SERPL-CCNC: 75 U/L (ref 55–135)
ALT SERPL W/O P-5'-P-CCNC: 14 U/L (ref 10–44)
ANION GAP SERPL CALC-SCNC: 10 MMOL/L (ref 8–16)
ANION GAP SERPL CALC-SCNC: 10 MMOL/L (ref 8–16)
ANION GAP SERPL CALC-SCNC: 16 MMOL/L (ref 8–16)
ANION GAP SERPL CALC-SCNC: 7 MMOL/L (ref 8–16)
APTT BLDCRRT: 29.4 SEC (ref 21–32)
APTT BLDCRRT: 30.5 SEC (ref 21–32)
AST SERPL-CCNC: 27 U/L (ref 10–40)
BASOPHILS # BLD AUTO: 0.01 K/UL (ref 0–0.2)
BASOPHILS # BLD AUTO: 0.03 K/UL (ref 0–0.2)
BASOPHILS # BLD AUTO: 0.05 K/UL (ref 0–0.2)
BASOPHILS NFR BLD: 0.1 % (ref 0–1.9)
BASOPHILS NFR BLD: 0.2 % (ref 0–1.9)
BASOPHILS NFR BLD: 0.5 % (ref 0–1.9)
BILIRUB SERPL-MCNC: 0.7 MG/DL (ref 0.1–1)
BUN SERPL-MCNC: 13 MG/DL (ref 8–23)
BUN SERPL-MCNC: 15 MG/DL (ref 8–23)
CALCIUM SERPL-MCNC: 8.9 MG/DL (ref 8.7–10.5)
CALCIUM SERPL-MCNC: 9.1 MG/DL (ref 8.7–10.5)
CALCIUM SERPL-MCNC: 9.1 MG/DL (ref 8.7–10.5)
CALCIUM SERPL-MCNC: 9.7 MG/DL (ref 8.7–10.5)
CHLORIDE SERPL-SCNC: 100 MMOL/L (ref 95–110)
CHLORIDE SERPL-SCNC: 104 MMOL/L (ref 95–110)
CHLORIDE SERPL-SCNC: 106 MMOL/L (ref 95–110)
CHLORIDE SERPL-SCNC: 106 MMOL/L (ref 95–110)
CO2 SERPL-SCNC: 19 MMOL/L (ref 23–29)
CO2 SERPL-SCNC: 22 MMOL/L (ref 23–29)
CO2 SERPL-SCNC: 22 MMOL/L (ref 23–29)
CO2 SERPL-SCNC: 28 MMOL/L (ref 23–29)
CREAT SERPL-MCNC: 0.9 MG/DL (ref 0.5–1.4)
CREAT SERPL-MCNC: 1 MG/DL (ref 0.5–1.4)
CREAT SERPL-MCNC: 1 MG/DL (ref 0.5–1.4)
CREAT SERPL-MCNC: 1.2 MG/DL (ref 0.5–1.4)
DELSYS: ABNORMAL
DIFFERENTIAL METHOD: ABNORMAL
EOSINOPHIL # BLD AUTO: 0 K/UL (ref 0–0.5)
EOSINOPHIL # BLD AUTO: 0.1 K/UL (ref 0–0.5)
EOSINOPHIL # BLD AUTO: 0.2 K/UL (ref 0–0.5)
EOSINOPHIL NFR BLD: 0 % (ref 0–8)
EOSINOPHIL NFR BLD: 0.3 % (ref 0–8)
EOSINOPHIL NFR BLD: 2.5 % (ref 0–8)
ERYTHROCYTE [DISTWIDTH] IN BLOOD BY AUTOMATED COUNT: 12.7 % (ref 11.5–14.5)
ERYTHROCYTE [DISTWIDTH] IN BLOOD BY AUTOMATED COUNT: 12.7 % (ref 11.5–14.5)
ERYTHROCYTE [DISTWIDTH] IN BLOOD BY AUTOMATED COUNT: 12.8 % (ref 11.5–14.5)
ERYTHROCYTE [DISTWIDTH] IN BLOOD BY AUTOMATED COUNT: 13 % (ref 11.5–14.5)
ERYTHROCYTE [SEDIMENTATION RATE] IN BLOOD BY WESTERGREN METHOD: 20 MM/H
EST. GFR  (AFRICAN AMERICAN): >60 ML/MIN/1.73 M^2
EST. GFR  (NON AFRICAN AMERICAN): 59 ML/MIN/1.73 M^2
EST. GFR  (NON AFRICAN AMERICAN): >60 ML/MIN/1.73 M^2
ESTIMATED AVG GLUCOSE: 105 MG/DL (ref 68–131)
FIBRINOGEN PPP-MCNC: 192 MG/DL (ref 182–366)
FIBRINOGEN PPP-MCNC: 195 MG/DL (ref 182–366)
FIO2: 100
GLUCOSE SERPL-MCNC: 131 MG/DL (ref 70–110)
GLUCOSE SERPL-MCNC: 131 MG/DL (ref 70–110)
GLUCOSE SERPL-MCNC: 133 MG/DL (ref 70–110)
GLUCOSE SERPL-MCNC: 136 MG/DL (ref 70–110)
GLUCOSE SERPL-MCNC: 153 MG/DL (ref 70–110)
GLUCOSE SERPL-MCNC: 173 MG/DL (ref 70–110)
GLUCOSE SERPL-MCNC: 179 MG/DL (ref 70–110)
GLUCOSE SERPL-MCNC: 193 MG/DL (ref 70–110)
GLUCOSE SERPL-MCNC: 262 MG/DL (ref 70–110)
GLUCOSE SERPL-MCNC: 89 MG/DL (ref 70–110)
GLUCOSE SERPL-MCNC: 92 MG/DL (ref 70–110)
HBA1C MFR BLD HPLC: 5.3 % (ref 4–5.6)
HCO3 UR-SCNC: 23.1 MMOL/L (ref 24–28)
HCO3 UR-SCNC: 24.4 MMOL/L (ref 24–28)
HCO3 UR-SCNC: 25.3 MMOL/L (ref 24–28)
HCO3 UR-SCNC: 26.8 MMOL/L (ref 24–28)
HCO3 UR-SCNC: 27 MMOL/L (ref 24–28)
HCO3 UR-SCNC: 27 MMOL/L (ref 24–28)
HCO3 UR-SCNC: 29 MMOL/L (ref 24–28)
HCT VFR BLD AUTO: 23 % (ref 40–54)
HCT VFR BLD AUTO: 25.1 % (ref 40–54)
HCT VFR BLD AUTO: 26.4 % (ref 40–54)
HCT VFR BLD AUTO: 41.2 % (ref 40–54)
HCT VFR BLD CALC: 21 %PCV (ref 36–54)
HCT VFR BLD CALC: 22 %PCV (ref 36–54)
HCT VFR BLD CALC: 25 %PCV (ref 36–54)
HCT VFR BLD CALC: 33 %PCV (ref 36–54)
HCT VFR BLD CALC: 35 %PCV (ref 36–54)
HGB BLD-MCNC: 14.3 G/DL (ref 14–18)
HGB BLD-MCNC: 8 G/DL (ref 14–18)
HGB BLD-MCNC: 9 G/DL (ref 14–18)
HGB BLD-MCNC: 9.4 G/DL (ref 14–18)
INR PPP: 1.2 (ref 0.8–1.2)
INR PPP: 1.6 (ref 0.8–1.2)
LYMPHOCYTES # BLD AUTO: 1.4 K/UL (ref 1–4.8)
LYMPHOCYTES # BLD AUTO: 1.9 K/UL (ref 1–4.8)
LYMPHOCYTES # BLD AUTO: 2 K/UL (ref 1–4.8)
LYMPHOCYTES NFR BLD: 10.5 % (ref 18–48)
LYMPHOCYTES NFR BLD: 19.9 % (ref 18–48)
LYMPHOCYTES NFR BLD: 7.6 % (ref 18–48)
MAGNESIUM SERPL-MCNC: 2.1 MG/DL (ref 1.6–2.6)
MAGNESIUM SERPL-MCNC: 3 MG/DL (ref 1.6–2.6)
MAGNESIUM SERPL-MCNC: 3.8 MG/DL (ref 1.6–2.6)
MCH RBC QN AUTO: 29.5 PG (ref 27–31)
MCH RBC QN AUTO: 29.8 PG (ref 27–31)
MCH RBC QN AUTO: 30.5 PG (ref 27–31)
MCH RBC QN AUTO: 30.6 PG (ref 27–31)
MCHC RBC AUTO-ENTMCNC: 34.7 G/DL (ref 32–36)
MCHC RBC AUTO-ENTMCNC: 34.8 G/DL (ref 32–36)
MCHC RBC AUTO-ENTMCNC: 35.6 G/DL (ref 32–36)
MCHC RBC AUTO-ENTMCNC: 35.9 G/DL (ref 32–36)
MCV RBC AUTO: 85 FL (ref 82–98)
MCV RBC AUTO: 85 FL (ref 82–98)
MCV RBC AUTO: 86 FL (ref 82–98)
MCV RBC AUTO: 86 FL (ref 82–98)
MODE: ABNORMAL
MONOCYTES # BLD AUTO: 0.6 K/UL (ref 0.3–1)
MONOCYTES # BLD AUTO: 1.2 K/UL (ref 0.3–1)
MONOCYTES # BLD AUTO: 1.9 K/UL (ref 0.3–1)
MONOCYTES NFR BLD: 13.4 % (ref 4–15)
MONOCYTES NFR BLD: 3.1 % (ref 4–15)
MONOCYTES NFR BLD: 9.8 % (ref 4–15)
NEUTROPHILS # BLD AUTO: 15.1 K/UL (ref 1.8–7.7)
NEUTROPHILS # BLD AUTO: 15.8 K/UL (ref 1.8–7.7)
NEUTROPHILS # BLD AUTO: 5.9 K/UL (ref 1.8–7.7)
NEUTROPHILS NFR BLD: 63.7 % (ref 38–73)
NEUTROPHILS NFR BLD: 79.2 % (ref 38–73)
NEUTROPHILS NFR BLD: 89.7 % (ref 38–73)
PCO2 BLDA: 35.3 MMHG (ref 35–45)
PCO2 BLDA: 38 MMHG (ref 35–45)
PCO2 BLDA: 39.6 MMHG (ref 35–45)
PCO2 BLDA: 40.9 MMHG (ref 35–45)
PCO2 BLDA: 42 MMHG (ref 35–45)
PCO2 BLDA: 43.7 MMHG (ref 35–45)
PCO2 BLDA: 46.5 MMHG (ref 35–45)
PEEP: 5
PH SMN: 7.38 [PH] (ref 7.35–7.45)
PH SMN: 7.4 [PH] (ref 7.35–7.45)
PH SMN: 7.4 [PH] (ref 7.35–7.45)
PH SMN: 7.41 [PH] (ref 7.35–7.45)
PH SMN: 7.41 [PH] (ref 7.35–7.45)
PH SMN: 7.42 [PH] (ref 7.35–7.45)
PH SMN: 7.46 [PH] (ref 7.35–7.45)
PHOSPHATE SERPL-MCNC: 3.5 MG/DL (ref 2.7–4.5)
PLATELET # BLD AUTO: 101 K/UL (ref 150–350)
PLATELET # BLD AUTO: 147 K/UL (ref 150–350)
PLATELET # BLD AUTO: 208 K/UL (ref 150–350)
PLATELET # BLD AUTO: 94 K/UL (ref 150–350)
PLATELET # BLD AUTO: 94 K/UL (ref 150–350)
PLATELET BLD QL SMEAR: ABNORMAL
PLATELET BLD QL SMEAR: ABNORMAL
PMV BLD AUTO: 10 FL (ref 9.2–12.9)
PMV BLD AUTO: 9.3 FL (ref 9.2–12.9)
PMV BLD AUTO: 9.7 FL (ref 9.2–12.9)
PO2 BLDA: 208 MMHG (ref 80–100)
PO2 BLDA: 211 MMHG (ref 80–100)
PO2 BLDA: 305 MMHG (ref 80–100)
PO2 BLDA: 351 MMHG (ref 80–100)
PO2 BLDA: 364 MMHG (ref 80–100)
PO2 BLDA: 49 MMHG (ref 40–60)
PO2 BLDA: 517 MMHG (ref 80–100)
POC ACTIVATED CLOTTING TIME K: 120 SEC (ref 74–137)
POC ACTIVATED CLOTTING TIME K: 131 SEC (ref 74–137)
POC ACTIVATED CLOTTING TIME K: 455 SEC (ref 74–137)
POC ACTIVATED CLOTTING TIME K: 466 SEC (ref 74–137)
POC ACTIVATED CLOTTING TIME K: 472 SEC (ref 74–137)
POC ACTIVATED CLOTTING TIME K: 483 SEC (ref 74–137)
POC ACTIVATED CLOTTING TIME K: 521 SEC (ref 74–137)
POC ACTIVATED CLOTTING TIME K: 527 SEC (ref 74–137)
POC ACTIVATED CLOTTING TIME K: 560 SEC (ref 74–137)
POC BE: -1 MMOL/L
POC BE: -1 MMOL/L
POC BE: 1 MMOL/L
POC BE: 2 MMOL/L
POC BE: 2 MMOL/L
POC BE: 3 MMOL/L
POC BE: 4 MMOL/L
POC IONIZED CALCIUM: 0.96 MMOL/L (ref 1.06–1.42)
POC IONIZED CALCIUM: 1.06 MMOL/L (ref 1.06–1.42)
POC IONIZED CALCIUM: 1.07 MMOL/L (ref 1.06–1.42)
POC IONIZED CALCIUM: 1.1 MMOL/L (ref 1.06–1.42)
POC IONIZED CALCIUM: 1.14 MMOL/L (ref 1.06–1.42)
POC IONIZED CALCIUM: 1.15 MMOL/L (ref 1.06–1.42)
POC IONIZED CALCIUM: 1.28 MMOL/L (ref 1.06–1.42)
POC SATURATED O2: 100 % (ref 95–100)
POC SATURATED O2: 85 % (ref 95–100)
POCT GLUCOSE: 137 MG/DL (ref 70–110)
POCT GLUCOSE: 173 MG/DL (ref 70–110)
POCT GLUCOSE: 231 MG/DL (ref 70–110)
POCT GLUCOSE: 241 MG/DL (ref 70–110)
POCT GLUCOSE: 268 MG/DL (ref 70–110)
POCT GLUCOSE: 271 MG/DL (ref 70–110)
POCT GLUCOSE: 292 MG/DL (ref 70–110)
POCT GLUCOSE: 307 MG/DL (ref 70–110)
POTASSIUM BLD-SCNC: 3.7 MMOL/L (ref 3.5–5.1)
POTASSIUM BLD-SCNC: 4.1 MMOL/L (ref 3.5–5.1)
POTASSIUM BLD-SCNC: 4.1 MMOL/L (ref 3.5–5.1)
POTASSIUM BLD-SCNC: 4.2 MMOL/L (ref 3.5–5.1)
POTASSIUM BLD-SCNC: 5.2 MMOL/L (ref 3.5–5.1)
POTASSIUM BLD-SCNC: 5.5 MMOL/L (ref 3.5–5.1)
POTASSIUM BLD-SCNC: 5.6 MMOL/L (ref 3.5–5.1)
POTASSIUM SERPL-SCNC: 4.2 MMOL/L (ref 3.5–5.1)
POTASSIUM SERPL-SCNC: 4.5 MMOL/L (ref 3.5–5.1)
PROT SERPL-MCNC: 6.5 G/DL (ref 6–8.4)
PROTHROMBIN TIME: 13.1 SEC (ref 9–12.5)
PROTHROMBIN TIME: 17 SEC (ref 9–12.5)
RBC # BLD AUTO: 2.71 M/UL (ref 4.6–6.2)
RBC # BLD AUTO: 2.94 M/UL (ref 4.6–6.2)
RBC # BLD AUTO: 3.08 M/UL (ref 4.6–6.2)
RBC # BLD AUTO: 4.8 M/UL (ref 4.6–6.2)
SAMPLE: ABNORMAL
SAMPLE: NORMAL
SAMPLE: NORMAL
SITE: ABNORMAL
SODIUM BLD-SCNC: 130 MMOL/L (ref 136–145)
SODIUM BLD-SCNC: 131 MMOL/L (ref 136–145)
SODIUM BLD-SCNC: 133 MMOL/L (ref 136–145)
SODIUM BLD-SCNC: 133 MMOL/L (ref 136–145)
SODIUM BLD-SCNC: 136 MMOL/L (ref 136–145)
SODIUM BLD-SCNC: 137 MMOL/L (ref 136–145)
SODIUM BLD-SCNC: 139 MMOL/L (ref 136–145)
SODIUM SERPL-SCNC: 135 MMOL/L (ref 136–145)
SODIUM SERPL-SCNC: 138 MMOL/L (ref 136–145)
SODIUM SERPL-SCNC: 138 MMOL/L (ref 136–145)
SODIUM SERPL-SCNC: 139 MMOL/L (ref 136–145)
VT: 400
WBC # BLD AUTO: 17.74 K/UL (ref 3.9–12.7)
WBC # BLD AUTO: 18.99 K/UL (ref 3.9–12.7)
WBC # BLD AUTO: 19.37 K/UL (ref 3.9–12.7)
WBC # BLD AUTO: 9.28 K/UL (ref 3.9–12.7)

## 2019-05-31 PROCEDURE — 27201423 OPTIME MED/SURG SUP & DEVICES STERILE SUPPLY: Performed by: THORACIC SURGERY (CARDIOTHORACIC VASCULAR SURGERY)

## 2019-05-31 PROCEDURE — 25000003 PHARM REV CODE 250: Performed by: THORACIC SURGERY (CARDIOTHORACIC VASCULAR SURGERY)

## 2019-05-31 PROCEDURE — 33508 ENDOSCOPIC VEIN HARVEST: CPT | Mod: ,,, | Performed by: THORACIC SURGERY (CARDIOTHORACIC VASCULAR SURGERY)

## 2019-05-31 PROCEDURE — 85610 PROTHROMBIN TIME: CPT | Mod: 91

## 2019-05-31 PROCEDURE — 80053 COMPREHEN METABOLIC PANEL: CPT

## 2019-05-31 PROCEDURE — 85014 HEMATOCRIT: CPT

## 2019-05-31 PROCEDURE — 99900035 HC TECH TIME PER 15 MIN (STAT)

## 2019-05-31 PROCEDURE — 82803 BLOOD GASES ANY COMBINATION: CPT

## 2019-05-31 PROCEDURE — 33533 PR CABG, ARTERIAL, SINGLE: ICD-10-PCS | Mod: ,,, | Performed by: THORACIC SURGERY (CARDIOTHORACIC VASCULAR SURGERY)

## 2019-05-31 PROCEDURE — C9290 INJ, BUPIVACAINE LIPOSOME: HCPCS | Performed by: THORACIC SURGERY (CARDIOTHORACIC VASCULAR SURGERY)

## 2019-05-31 PROCEDURE — 37000008 HC ANESTHESIA 1ST 15 MINUTES: Performed by: THORACIC SURGERY (CARDIOTHORACIC VASCULAR SURGERY)

## 2019-05-31 PROCEDURE — 33521 CABG ARTERY-VEIN FOUR: CPT | Mod: ,,, | Performed by: THORACIC SURGERY (CARDIOTHORACIC VASCULAR SURGERY)

## 2019-05-31 PROCEDURE — 63600175 PHARM REV CODE 636 W HCPCS: Performed by: THORACIC SURGERY (CARDIOTHORACIC VASCULAR SURGERY)

## 2019-05-31 PROCEDURE — 84132 ASSAY OF SERUM POTASSIUM: CPT

## 2019-05-31 PROCEDURE — 99900038 HC OT GENERIC THERAPY SCREENING (STAT): Performed by: PHYSICAL THERAPIST

## 2019-05-31 PROCEDURE — 82330 ASSAY OF CALCIUM: CPT

## 2019-05-31 PROCEDURE — 33521 PR CABG, ARTERY-VEIN, FOUR: ICD-10-PCS | Mod: ,,, | Performed by: THORACIC SURGERY (CARDIOTHORACIC VASCULAR SURGERY)

## 2019-05-31 PROCEDURE — 36000712 HC OR TIME LEV V 1ST 15 MIN: Performed by: THORACIC SURGERY (CARDIOTHORACIC VASCULAR SURGERY)

## 2019-05-31 PROCEDURE — 84100 ASSAY OF PHOSPHORUS: CPT

## 2019-05-31 PROCEDURE — 99291 CRITICAL CARE FIRST HOUR: CPT | Mod: ,,, | Performed by: INTERNAL MEDICINE

## 2019-05-31 PROCEDURE — 94640 AIRWAY INHALATION TREATMENT: CPT

## 2019-05-31 PROCEDURE — 85610 PROTHROMBIN TIME: CPT

## 2019-05-31 PROCEDURE — 83735 ASSAY OF MAGNESIUM: CPT | Mod: 91

## 2019-05-31 PROCEDURE — 99233 SBSQ HOSP IP/OBS HIGH 50: CPT | Mod: ,,, | Performed by: INTERNAL MEDICINE

## 2019-05-31 PROCEDURE — 83735 ASSAY OF MAGNESIUM: CPT

## 2019-05-31 PROCEDURE — 99233 PR SUBSEQUENT HOSPITAL CARE,LEVL III: ICD-10-PCS | Mod: ,,, | Performed by: INTERNAL MEDICINE

## 2019-05-31 PROCEDURE — 99900037 HC PT THERAPY SCREENING (STAT)

## 2019-05-31 PROCEDURE — 20000000 HC ICU ROOM

## 2019-05-31 PROCEDURE — 27200667 HC PACEMAKER, TEMPORARY MONITORING, PER SHIFT

## 2019-05-31 PROCEDURE — 63600175 PHARM REV CODE 636 W HCPCS: Performed by: NURSE PRACTITIONER

## 2019-05-31 PROCEDURE — 80048 BASIC METABOLIC PNL TOTAL CA: CPT

## 2019-05-31 PROCEDURE — P9045 ALBUMIN (HUMAN), 5%, 250 ML: HCPCS | Mod: JG | Performed by: NURSE ANESTHETIST, CERTIFIED REGISTERED

## 2019-05-31 PROCEDURE — 25000003 PHARM REV CODE 250: Performed by: NURSE PRACTITIONER

## 2019-05-31 PROCEDURE — 94002 VENT MGMT INPAT INIT DAY: CPT

## 2019-05-31 PROCEDURE — 97802 MEDICAL NUTRITION INDIV IN: CPT

## 2019-05-31 PROCEDURE — 82800 BLOOD PH: CPT

## 2019-05-31 PROCEDURE — C1729 CATH, DRAINAGE: HCPCS | Performed by: THORACIC SURGERY (CARDIOTHORACIC VASCULAR SURGERY)

## 2019-05-31 PROCEDURE — 85384 FIBRINOGEN ACTIVITY: CPT

## 2019-05-31 PROCEDURE — 25000003 PHARM REV CODE 250: Performed by: NURSE ANESTHETIST, CERTIFIED REGISTERED

## 2019-05-31 PROCEDURE — 33533 CABG ARTERIAL SINGLE: CPT | Mod: ,,, | Performed by: THORACIC SURGERY (CARDIOTHORACIC VASCULAR SURGERY)

## 2019-05-31 PROCEDURE — 63600175 PHARM REV CODE 636 W HCPCS

## 2019-05-31 PROCEDURE — 37000009 HC ANESTHESIA EA ADD 15 MINS: Performed by: THORACIC SURGERY (CARDIOTHORACIC VASCULAR SURGERY)

## 2019-05-31 PROCEDURE — 85384 FIBRINOGEN ACTIVITY: CPT | Mod: 91

## 2019-05-31 PROCEDURE — 25000003 PHARM REV CODE 250

## 2019-05-31 PROCEDURE — 85025 COMPLETE CBC W/AUTO DIFF WBC: CPT | Mod: 91

## 2019-05-31 PROCEDURE — 85730 THROMBOPLASTIN TIME PARTIAL: CPT

## 2019-05-31 PROCEDURE — 36415 COLL VENOUS BLD VENIPUNCTURE: CPT

## 2019-05-31 PROCEDURE — 84295 ASSAY OF SERUM SODIUM: CPT

## 2019-05-31 PROCEDURE — S0017 INJECTION, AMINOCAPROIC ACID: HCPCS

## 2019-05-31 PROCEDURE — 33521 CABG ARTERY-VEIN FOUR: CPT | Mod: AS,,, | Performed by: NURSE PRACTITIONER

## 2019-05-31 PROCEDURE — 33533 CABG ARTERIAL SINGLE: CPT | Mod: AS,,, | Performed by: NURSE PRACTITIONER

## 2019-05-31 PROCEDURE — 71000028 HC RECOVERY HIGH ACUITY/ PER HOUR

## 2019-05-31 PROCEDURE — 33533 PR CABG, ARTERIAL, SINGLE: ICD-10-PCS | Mod: AS,,, | Performed by: NURSE PRACTITIONER

## 2019-05-31 PROCEDURE — 80048 BASIC METABOLIC PNL TOTAL CA: CPT | Mod: 91

## 2019-05-31 PROCEDURE — 85730 THROMBOPLASTIN TIME PARTIAL: CPT | Mod: 91

## 2019-05-31 PROCEDURE — 33521 PR CABG, ARTERY-VEIN, FOUR: ICD-10-PCS | Mod: AS,,, | Performed by: NURSE PRACTITIONER

## 2019-05-31 PROCEDURE — S0017 INJECTION, AMINOCAPROIC ACID: HCPCS | Performed by: NURSE ANESTHETIST, CERTIFIED REGISTERED

## 2019-05-31 PROCEDURE — 63600175 PHARM REV CODE 636 W HCPCS: Performed by: NURSE ANESTHETIST, CERTIFIED REGISTERED

## 2019-05-31 PROCEDURE — 99291 PR CRITICAL CARE, E/M 30-74 MINUTES: ICD-10-PCS | Mod: ,,, | Performed by: INTERNAL MEDICINE

## 2019-05-31 PROCEDURE — 25000242 PHARM REV CODE 250 ALT 637 W/ HCPCS: Performed by: NURSE PRACTITIONER

## 2019-05-31 PROCEDURE — P9045 ALBUMIN (HUMAN), 5%, 250 ML: HCPCS | Mod: JG | Performed by: NURSE PRACTITIONER

## 2019-05-31 PROCEDURE — 37799 UNLISTED PX VASCULAR SURGERY: CPT

## 2019-05-31 PROCEDURE — P9047 ALBUMIN (HUMAN), 25%, 50ML: HCPCS | Mod: JG

## 2019-05-31 PROCEDURE — 85027 COMPLETE CBC AUTOMATED: CPT

## 2019-05-31 PROCEDURE — 36000713 HC OR TIME LEV V EA ADD 15 MIN: Performed by: THORACIC SURGERY (CARDIOTHORACIC VASCULAR SURGERY)

## 2019-05-31 PROCEDURE — 33508 PR ENDOSCOPY W/VIDEO-ASST VEIN HARVEST,CABG: ICD-10-PCS | Mod: ,,, | Performed by: THORACIC SURGERY (CARDIOTHORACIC VASCULAR SURGERY)

## 2019-05-31 RX ORDER — POTASSIUM CHLORIDE 14.9 MG/ML
20 INJECTION INTRAVENOUS
Status: DISCONTINUED | OUTPATIENT
Start: 2019-05-31 | End: 2019-06-05 | Stop reason: HOSPADM

## 2019-05-31 RX ORDER — OXYCODONE HYDROCHLORIDE 5 MG/1
10 TABLET ORAL EVERY 4 HOURS PRN
Status: DISCONTINUED | OUTPATIENT
Start: 2019-05-31 | End: 2019-06-02

## 2019-05-31 RX ORDER — CEFAZOLIN SODIUM 1 G/3ML
INJECTION, POWDER, FOR SOLUTION INTRAMUSCULAR; INTRAVENOUS
Status: DISCONTINUED | OUTPATIENT
Start: 2019-05-31 | End: 2019-05-31

## 2019-05-31 RX ORDER — AMINOCAPROIC ACID 250 MG/ML
INJECTION, SOLUTION INTRAVENOUS
Status: DISCONTINUED | OUTPATIENT
Start: 2019-05-31 | End: 2019-05-31

## 2019-05-31 RX ORDER — PANTOPRAZOLE SODIUM 40 MG/10ML
40 INJECTION, POWDER, LYOPHILIZED, FOR SOLUTION INTRAVENOUS DAILY
Status: DISCONTINUED | OUTPATIENT
Start: 2019-06-01 | End: 2019-06-02

## 2019-05-31 RX ORDER — VANCOMYCIN HCL IN 5 % DEXTROSE 1G/250ML
1000 PLASTIC BAG, INJECTION (ML) INTRAVENOUS
Status: DISCONTINUED | OUTPATIENT
Start: 2019-05-31 | End: 2019-05-31 | Stop reason: ALTCHOICE

## 2019-05-31 RX ORDER — BUPIVACAINE HYDROCHLORIDE 2.5 MG/ML
INJECTION, SOLUTION EPIDURAL; INFILTRATION; INTRACAUDAL
Status: DISCONTINUED | OUTPATIENT
Start: 2019-05-31 | End: 2019-05-31 | Stop reason: HOSPADM

## 2019-05-31 RX ORDER — HEPARIN SODIUM 1000 [USP'U]/ML
INJECTION, SOLUTION INTRAVENOUS; SUBCUTANEOUS
Status: DISCONTINUED | OUTPATIENT
Start: 2019-05-31 | End: 2019-05-31 | Stop reason: HOSPADM

## 2019-05-31 RX ORDER — LIDOCAINE HYDROCHLORIDE ANHYDROUS AND DEXTROSE MONOHYDRATE .8; 5 G/100ML; G/100ML
INJECTION, SOLUTION INTRAVENOUS CONTINUOUS PRN
Status: DISCONTINUED | OUTPATIENT
Start: 2019-05-31 | End: 2019-05-31

## 2019-05-31 RX ORDER — FENTANYL CITRATE 50 UG/ML
INJECTION, SOLUTION INTRAMUSCULAR; INTRAVENOUS
Status: DISCONTINUED | OUTPATIENT
Start: 2019-05-31 | End: 2019-05-31

## 2019-05-31 RX ORDER — SODIUM CHLORIDE, SODIUM LACTATE, POTASSIUM CHLORIDE, CALCIUM CHLORIDE 600; 310; 30; 20 MG/100ML; MG/100ML; MG/100ML; MG/100ML
500 INJECTION, SOLUTION INTRAVENOUS
Status: DISCONTINUED | OUTPATIENT
Start: 2019-05-31 | End: 2019-06-05 | Stop reason: HOSPADM

## 2019-05-31 RX ORDER — POLYETHYLENE GLYCOL 3350 17 G/17G
17 POWDER, FOR SOLUTION ORAL DAILY
Status: DISCONTINUED | OUTPATIENT
Start: 2019-06-01 | End: 2019-05-31 | Stop reason: SDUPTHER

## 2019-05-31 RX ORDER — MAGNESIUM SULFATE 1 G/100ML
1 INJECTION INTRAVENOUS
Status: DISCONTINUED | OUTPATIENT
Start: 2019-05-31 | End: 2019-06-05 | Stop reason: HOSPADM

## 2019-05-31 RX ORDER — HEPARIN SODIUM 1000 [USP'U]/ML
INJECTION, SOLUTION INTRAVENOUS; SUBCUTANEOUS
Status: DISCONTINUED | OUTPATIENT
Start: 2019-05-31 | End: 2019-05-31

## 2019-05-31 RX ORDER — CHLORHEXIDINE GLUCONATE ORAL RINSE 1.2 MG/ML
10 SOLUTION DENTAL 2 TIMES DAILY
Status: DISCONTINUED | OUTPATIENT
Start: 2019-05-31 | End: 2019-06-05 | Stop reason: HOSPADM

## 2019-05-31 RX ORDER — FENTANYL CITRATE 50 UG/ML
25 INJECTION, SOLUTION INTRAMUSCULAR; INTRAVENOUS
Status: DISCONTINUED | OUTPATIENT
Start: 2019-05-31 | End: 2019-06-01

## 2019-05-31 RX ORDER — ALBUMIN HUMAN 50 G/1000ML
250 SOLUTION INTRAVENOUS
Status: DISCONTINUED | OUTPATIENT
Start: 2019-05-31 | End: 2019-06-05 | Stop reason: HOSPADM

## 2019-05-31 RX ORDER — SODIUM CHLORIDE, SODIUM LACTATE, POTASSIUM CHLORIDE, CALCIUM CHLORIDE 600; 310; 30; 20 MG/100ML; MG/100ML; MG/100ML; MG/100ML
INJECTION, SOLUTION INTRAVENOUS CONTINUOUS PRN
Status: DISCONTINUED | OUTPATIENT
Start: 2019-05-31 | End: 2019-05-31

## 2019-05-31 RX ORDER — NICARDIPINE HYDROCHLORIDE 0.2 MG/ML
5 INJECTION INTRAVENOUS CONTINUOUS
Status: DISCONTINUED | OUTPATIENT
Start: 2019-05-31 | End: 2019-06-02

## 2019-05-31 RX ORDER — POTASSIUM CHLORIDE 20 MEQ/1
20 TABLET, EXTENDED RELEASE ORAL EVERY 12 HOURS
Status: DISCONTINUED | OUTPATIENT
Start: 2019-06-01 | End: 2019-06-02

## 2019-05-31 RX ORDER — PROPOFOL 10 MG/ML
VIAL (ML) INTRAVENOUS
Status: DISCONTINUED | OUTPATIENT
Start: 2019-05-31 | End: 2019-05-31

## 2019-05-31 RX ORDER — OXYCODONE HYDROCHLORIDE 5 MG/1
5 TABLET ORAL EVERY 4 HOURS PRN
Status: DISCONTINUED | OUTPATIENT
Start: 2019-05-31 | End: 2019-06-02

## 2019-05-31 RX ORDER — FUROSEMIDE 10 MG/ML
40 INJECTION INTRAMUSCULAR; INTRAVENOUS 2 TIMES DAILY
Status: DISCONTINUED | OUTPATIENT
Start: 2019-06-01 | End: 2019-06-03

## 2019-05-31 RX ORDER — ASPIRIN 81 MG/1
81 TABLET ORAL DAILY
Status: DISCONTINUED | OUTPATIENT
Start: 2019-06-01 | End: 2019-06-05 | Stop reason: HOSPADM

## 2019-05-31 RX ORDER — POTASSIUM CHLORIDE 29.8 MG/ML
40 INJECTION INTRAVENOUS
Status: DISCONTINUED | OUTPATIENT
Start: 2019-05-31 | End: 2019-06-05 | Stop reason: HOSPADM

## 2019-05-31 RX ORDER — ROCURONIUM BROMIDE 10 MG/ML
INJECTION, SOLUTION INTRAVENOUS
Status: DISCONTINUED | OUTPATIENT
Start: 2019-05-31 | End: 2019-05-31

## 2019-05-31 RX ORDER — CEFAZOLIN SODIUM 2 G/50ML
2 SOLUTION INTRAVENOUS
Status: COMPLETED | OUTPATIENT
Start: 2019-05-31 | End: 2019-06-01

## 2019-05-31 RX ORDER — POLYETHYLENE GLYCOL 3350 17 G/17G
17 POWDER, FOR SOLUTION ORAL DAILY
Status: DISCONTINUED | OUTPATIENT
Start: 2019-06-01 | End: 2019-06-05 | Stop reason: HOSPADM

## 2019-05-31 RX ORDER — CLOPIDOGREL BISULFATE 75 MG/1
75 TABLET ORAL DAILY
Status: DISCONTINUED | OUTPATIENT
Start: 2019-06-01 | End: 2019-06-05 | Stop reason: HOSPADM

## 2019-05-31 RX ORDER — CHLORHEXIDINE GLUCONATE ORAL RINSE 1.2 MG/ML
10 SOLUTION DENTAL
Status: DISCONTINUED | OUTPATIENT
Start: 2019-05-31 | End: 2019-06-04 | Stop reason: SDUPTHER

## 2019-05-31 RX ORDER — CALCIUM GLUCONATE 98 MG/ML
INJECTION, SOLUTION INTRAVENOUS
Status: DISPENSED
Start: 2019-05-31 | End: 2019-06-01

## 2019-05-31 RX ORDER — DEXTROSE, SODIUM CHLORIDE, SODIUM LACTATE, POTASSIUM CHLORIDE, AND CALCIUM CHLORIDE 5; .6; .31; .03; .02 G/100ML; G/100ML; G/100ML; G/100ML; G/100ML
INJECTION, SOLUTION INTRAVENOUS CONTINUOUS
Status: ACTIVE | OUTPATIENT
Start: 2019-05-31 | End: 2019-06-01

## 2019-05-31 RX ORDER — MIDAZOLAM HYDROCHLORIDE 1 MG/ML
INJECTION, SOLUTION INTRAMUSCULAR; INTRAVENOUS
Status: DISCONTINUED | OUTPATIENT
Start: 2019-05-31 | End: 2019-05-31

## 2019-05-31 RX ORDER — METOPROLOL TARTRATE 25 MG/1
25 TABLET, FILM COATED ORAL 2 TIMES DAILY
Status: DISCONTINUED | OUTPATIENT
Start: 2019-06-01 | End: 2019-06-04

## 2019-05-31 RX ORDER — ONDANSETRON 2 MG/ML
4 INJECTION INTRAMUSCULAR; INTRAVENOUS EVERY 12 HOURS PRN
Status: DISCONTINUED | OUTPATIENT
Start: 2019-05-31 | End: 2019-06-05 | Stop reason: HOSPADM

## 2019-05-31 RX ORDER — ONDANSETRON 2 MG/ML
INJECTION INTRAMUSCULAR; INTRAVENOUS
Status: DISCONTINUED | OUTPATIENT
Start: 2019-05-31 | End: 2019-05-31

## 2019-05-31 RX ORDER — CEFAZOLIN SODIUM 2 G/50ML
2 SOLUTION INTRAVENOUS
Status: DISCONTINUED | OUTPATIENT
Start: 2019-05-31 | End: 2019-05-31

## 2019-05-31 RX ORDER — LIDOCAINE HCL/PF 100 MG/5ML
SYRINGE (ML) INTRAVENOUS
Status: DISCONTINUED | OUTPATIENT
Start: 2019-05-31 | End: 2019-05-31

## 2019-05-31 RX ORDER — IPRATROPIUM BROMIDE AND ALBUTEROL SULFATE 2.5; .5 MG/3ML; MG/3ML
3 SOLUTION RESPIRATORY (INHALATION) EVERY 4 HOURS
Status: COMPLETED | OUTPATIENT
Start: 2019-05-31 | End: 2019-06-01

## 2019-05-31 RX ORDER — FENTANYL CITRATE 50 UG/ML
50 INJECTION, SOLUTION INTRAMUSCULAR; INTRAVENOUS
Status: DISCONTINUED | OUTPATIENT
Start: 2019-05-31 | End: 2019-06-01

## 2019-05-31 RX ORDER — ADENOSINE 3 MG/ML
INJECTION INTRAVENOUS
Status: DISCONTINUED | OUTPATIENT
Start: 2019-05-31 | End: 2019-05-31 | Stop reason: HOSPADM

## 2019-05-31 RX ORDER — CALCIUM GLUCONATE 98 MG/ML
1 INJECTION, SOLUTION INTRAVENOUS ONCE
Status: COMPLETED | OUTPATIENT
Start: 2019-05-31 | End: 2019-05-31

## 2019-05-31 RX ORDER — CEFAZOLIN SODIUM 2 G/50ML
2 SOLUTION INTRAVENOUS
Status: DISCONTINUED | OUTPATIENT
Start: 2019-05-31 | End: 2019-06-05 | Stop reason: HOSPADM

## 2019-05-31 RX ORDER — DEXMEDETOMIDINE HYDROCHLORIDE 4 UG/ML
0.2 INJECTION, SOLUTION INTRAVENOUS CONTINUOUS
Status: DISCONTINUED | OUTPATIENT
Start: 2019-05-31 | End: 2019-06-02

## 2019-05-31 RX ORDER — POTASSIUM CHLORIDE 14.9 MG/ML
60 INJECTION INTRAVENOUS
Status: DISCONTINUED | OUTPATIENT
Start: 2019-05-31 | End: 2019-06-05 | Stop reason: HOSPADM

## 2019-05-31 RX ORDER — ALBUMIN HUMAN 50 G/1000ML
SOLUTION INTRAVENOUS CONTINUOUS PRN
Status: DISCONTINUED | OUTPATIENT
Start: 2019-05-31 | End: 2019-05-31

## 2019-05-31 RX ORDER — ACETAMINOPHEN 10 MG/ML
INJECTION, SOLUTION INTRAVENOUS
Status: DISCONTINUED | OUTPATIENT
Start: 2019-05-31 | End: 2019-05-31

## 2019-05-31 RX ORDER — PROTAMINE SULFATE 10 MG/ML
INJECTION, SOLUTION INTRAVENOUS
Status: DISCONTINUED | OUTPATIENT
Start: 2019-05-31 | End: 2019-05-31

## 2019-05-31 RX ORDER — METOCLOPRAMIDE HYDROCHLORIDE 5 MG/ML
5 INJECTION INTRAMUSCULAR; INTRAVENOUS EVERY 6 HOURS PRN
Status: DISCONTINUED | OUTPATIENT
Start: 2019-05-31 | End: 2019-06-05 | Stop reason: HOSPADM

## 2019-05-31 RX ORDER — PAPAVERINE HYDROCHLORIDE 30 MG/ML
INJECTION INTRAMUSCULAR; INTRAVENOUS
Status: DISCONTINUED | OUTPATIENT
Start: 2019-05-31 | End: 2019-05-31 | Stop reason: HOSPADM

## 2019-05-31 RX ADMIN — SODIUM CHLORIDE 1 UNITS/HR: 9 INJECTION, SOLUTION INTRAVENOUS at 05:05

## 2019-05-31 RX ADMIN — AMINOCAPROIC ACID 5 G: 250 INJECTION, SOLUTION INTRAVENOUS at 09:05

## 2019-05-31 RX ADMIN — DEXTROSE, SODIUM CHLORIDE, SODIUM LACTATE, POTASSIUM CHLORIDE, AND CALCIUM CHLORIDE: 5; .6; .31; .03; .02 INJECTION, SOLUTION INTRAVENOUS at 03:05

## 2019-05-31 RX ADMIN — FENTANYL CITRATE 150 MCG: 50 INJECTION, SOLUTION INTRAMUSCULAR; INTRAVENOUS at 12:05

## 2019-05-31 RX ADMIN — MIDAZOLAM 2 MG: 1 INJECTION INTRAMUSCULAR; INTRAVENOUS at 07:05

## 2019-05-31 RX ADMIN — CEFAZOLIN SODIUM 2 G: 2 SOLUTION INTRAVENOUS at 09:05

## 2019-05-31 RX ADMIN — PROTAMINE SULFATE 300 MG: 10 INJECTION, SOLUTION INTRAVENOUS at 01:05

## 2019-05-31 RX ADMIN — SODIUM CHLORIDE 3 UNITS: 9 INJECTION, SOLUTION INTRAVENOUS at 12:05

## 2019-05-31 RX ADMIN — BUPIVACAINE 266 MG: 13.3 INJECTION, SUSPENSION, LIPOSOMAL INFILTRATION at 08:05

## 2019-05-31 RX ADMIN — FENTANYL CITRATE 100 MCG: 50 INJECTION, SOLUTION INTRAMUSCULAR; INTRAVENOUS at 10:05

## 2019-05-31 RX ADMIN — ACETAMINOPHEN 1000 MG: 10 INJECTION, SOLUTION INTRAVENOUS at 08:05

## 2019-05-31 RX ADMIN — FENTANYL CITRATE 50 MCG: 50 INJECTION INTRAMUSCULAR; INTRAVENOUS at 03:05

## 2019-05-31 RX ADMIN — ALBUMIN (HUMAN) 250 ML: 12.5 SOLUTION INTRAVENOUS at 06:05

## 2019-05-31 RX ADMIN — SODIUM CHLORIDE, SODIUM LACTATE, POTASSIUM CHLORIDE, AND CALCIUM CHLORIDE: 600; 310; 30; 20 INJECTION, SOLUTION INTRAVENOUS at 08:05

## 2019-05-31 RX ADMIN — ALBUMIN (HUMAN) 250 ML: 12.5 SOLUTION INTRAVENOUS at 03:05

## 2019-05-31 RX ADMIN — MAGNESIUM SULFATE 1 G: 1 INJECTION INTRAVENOUS at 04:05

## 2019-05-31 RX ADMIN — CALCIUM CHLORIDE 1000 MG: 100 INJECTION, SOLUTION INTRAVENOUS at 01:05

## 2019-05-31 RX ADMIN — CALCIUM GLUCONATE 1 G: 98 INJECTION, SOLUTION INTRAVENOUS at 03:05

## 2019-05-31 RX ADMIN — IPRATROPIUM BROMIDE AND ALBUTEROL SULFATE 3 ML: .5; 3 SOLUTION RESPIRATORY (INHALATION) at 08:05

## 2019-05-31 RX ADMIN — ROCURONIUM BROMIDE 100 MG: 10 INJECTION, SOLUTION INTRAVENOUS at 07:05

## 2019-05-31 RX ADMIN — AMINOCAPROIC ACID 5 G: 250 INJECTION, SOLUTION INTRAVENOUS at 01:05

## 2019-05-31 RX ADMIN — IPRATROPIUM BROMIDE AND ALBUTEROL SULFATE 3 ML: .5; 3 SOLUTION RESPIRATORY (INHALATION) at 11:05

## 2019-05-31 RX ADMIN — VASOPRESSIN 0.04 UNITS/MIN: 20 INJECTION INTRAVENOUS at 03:05

## 2019-05-31 RX ADMIN — NOREPINEPHRINE BITARTRATE 0.02 MCG/KG/MIN: 1 INJECTION, SOLUTION, CONCENTRATE INTRAVENOUS at 08:05

## 2019-05-31 RX ADMIN — SODIUM CHLORIDE 3 UNITS: 9 INJECTION, SOLUTION INTRAVENOUS at 11:05

## 2019-05-31 RX ADMIN — PROPOFOL 40 MG: 10 INJECTION, EMULSION INTRAVENOUS at 07:05

## 2019-05-31 RX ADMIN — SODIUM CHLORIDE, SODIUM LACTATE, POTASSIUM CHLORIDE, AND CALCIUM CHLORIDE: 600; 310; 30; 20 INJECTION, SOLUTION INTRAVENOUS at 02:05

## 2019-05-31 RX ADMIN — ALBUMIN (HUMAN): 12.5 INJECTION, SOLUTION INTRAVENOUS at 01:05

## 2019-05-31 RX ADMIN — FENTANYL CITRATE 25 MCG: 50 INJECTION INTRAMUSCULAR; INTRAVENOUS at 11:05

## 2019-05-31 RX ADMIN — SODIUM CHLORIDE 2 UNITS/HR: 9 INJECTION, SOLUTION INTRAVENOUS at 12:05

## 2019-05-31 RX ADMIN — CHLORHEXIDINE GLUCONATE 0.12% ORAL RINSE 10 ML: 1.2 LIQUID ORAL at 08:05

## 2019-05-31 RX ADMIN — LIDOCAINE HYDROCHLORIDE 100 MG: 20 INJECTION, SOLUTION INTRAVENOUS at 07:05

## 2019-05-31 RX ADMIN — CEFAZOLIN 2 G: 1 INJECTION, POWDER, FOR SOLUTION INTRAMUSCULAR; INTRAVENOUS at 07:05

## 2019-05-31 RX ADMIN — SODIUM CHLORIDE, SODIUM LACTATE, POTASSIUM CHLORIDE, AND CALCIUM CHLORIDE: 600; 310; 30; 20 INJECTION, SOLUTION INTRAVENOUS at 07:05

## 2019-05-31 RX ADMIN — HEPARIN SODIUM 25000 UNITS: 1000 INJECTION, SOLUTION INTRAVENOUS; SUBCUTANEOUS at 09:05

## 2019-05-31 RX ADMIN — DEXMEDETOMIDINE HYDROCHLORIDE 1 MCG/KG/HR: 400 INJECTION INTRAVENOUS at 03:05

## 2019-05-31 RX ADMIN — ALBUMIN (HUMAN): 12.5 INJECTION, SOLUTION INTRAVENOUS at 02:05

## 2019-05-31 RX ADMIN — SODIUM CHLORIDE 1 G: 900 INJECTION, SOLUTION INTRAVENOUS at 07:05

## 2019-05-31 RX ADMIN — CEFAZOLIN 2 G: 1 INJECTION, POWDER, FOR SOLUTION INTRAMUSCULAR; INTRAVENOUS at 02:05

## 2019-05-31 RX ADMIN — ALBUMIN (HUMAN) 250 ML: 12.5 SOLUTION INTRAVENOUS at 09:05

## 2019-05-31 RX ADMIN — FENTANYL CITRATE 100 MCG: 50 INJECTION, SOLUTION INTRAMUSCULAR; INTRAVENOUS at 08:05

## 2019-05-31 RX ADMIN — FENTANYL CITRATE 100 MCG: 50 INJECTION, SOLUTION INTRAMUSCULAR; INTRAVENOUS at 07:05

## 2019-05-31 RX ADMIN — IPRATROPIUM BROMIDE AND ALBUTEROL SULFATE 3 ML: .5; 3 SOLUTION RESPIRATORY (INHALATION) at 03:05

## 2019-05-31 RX ADMIN — LIDOCAINE HYDROCHLORIDE 2 MG/MIN: 8 INJECTION, SOLUTION INTRAVENOUS at 07:05

## 2019-05-31 RX ADMIN — CALCIUM GLUCONATE 2000 MG: 98 INJECTION, SOLUTION INTRAVENOUS at 05:05

## 2019-05-31 RX ADMIN — FENTANYL CITRATE 50 MCG: 50 INJECTION, SOLUTION INTRAMUSCULAR; INTRAVENOUS at 07:05

## 2019-05-31 NOTE — ANESTHESIA PROCEDURE NOTES
PATRICIA    Diagnosis: CAD  Patient location during procedure: OR  Procedure start time: 5/31/2019 7:47 AM  Timeout: 5/31/2019 7:46 AM  Staffing  Anesthesiologist: Wally Looney MD  Performed: anesthesiologist   Preanesthetic Checklist  Completed: patient identified, surgical consent, pre-op evaluation, timeout performed, risks and benefits discussed, monitors and equipment checked, anesthesia consent given, oxygen available, suction available, hand hygiene performed and patient being monitored  Setup & Induction  Patient preparation: bite block inserted  Probe Insertion: easy  Exam: complete      Findings  Impression  Other Findings  PostCPB findings  Probe Removal      Exam     Left Heart  Left Atruim: normal (men 3.0-4.0; women 2.7-3.8)    Left Ventricle: cm, normal (men 4.2-5.9; women 3.8-5.2)  LV Wall Thickness (posterior wall): cm, normal (men 0.6-1.0 cm; women 0.6-0.9 cm)    LVAD  Estimated Ejection Fraction: > 55% normal  Regional Wall Abnormalities: no RWMA            Right Heart  Right Ventricle: normal  Right Ventricle Function: normal  Right Atria: cm and normal    Intra Atrial Septum  PFO: no shunt by color flow doppler  no IAS aneurysm  no lipomatous hypertrophy  no Atrial Septal Defect (Asd)    Right Ventricle  Size: normal, Free Wall Thickness: normal    Aortic Valve:  Stenosis: none  Morphology: trileaflet  Regurgitation: no aortic valve regurgitation     Mitral Valve:  Morphology:normal  Jet Description: noneStenosis: no significant stenosis.    Tricuspid Valve:  Morphology: normal  Regurgitation: none    Pulmonic Valve:  Morphology:normal  Regurgitation(color flow): none    Great Vessels  Ascending Aorta Atherosclerosis: 3=sessile dz (3-5mm)  Aortic Arch Atherosclerosis: 2=mild dz (<3mm)  IABP: no  Descending Aorta Atherosclerosis: 3=sessile dz (3-5mm)  Aorta    Descending aorta IABP: no    Effusions  Effusions: none    Summary  Findings discussed with surgeon.    Other Findings   PostCPB  findings

## 2019-05-31 NOTE — ASSESSMENT & PLAN NOTE
.  SHORT TERM SEDATION:     []        Midazolam  []        Propofol  [x]        Dexmedetomidine     ANALGESIA:     []        Morphine   []        Fentanyl  []        Hydromorphone.         RASS  => - 2     Neurochecks per routine.  Daily sedation vacation

## 2019-05-31 NOTE — PLAN OF CARE
Problem: Adult Inpatient Plan of Care  Goal: Plan of Care Review  Outcome: Ongoing (interventions implemented as appropriate)  Pt continues to be on mechanical vent.

## 2019-05-31 NOTE — ANESTHESIA POSTPROCEDURE EVALUATION
Anesthesia Post Evaluation    Patient: Chapin Calderon    Procedure(s) Performed: Procedure(s) (LRB):  CORONARY ARTERY BYPASS GRAFT (CABG) (N/A)  ECHOCARDIOGRAM,TRANSESOPHAGEAL (N/A)  SURGICAL PROCUREMENT, VEIN, ENDOSCOPIC (Bilateral)    Final Anesthesia Type: general  Patient location during evaluation: ICU  Patient participation: No - Unable to Participate, Intubation  Level of consciousness: sedated  Post-procedure vital signs: reviewed and stable  Pain management: adequate  Airway patency: patent  PONV status at discharge: No PONV  Anesthetic complications: no      Cardiovascular status: hemodynamically stable (on pressor drips.)  Respiratory status: ETT  Hydration status: euvolemic  Follow-up not needed.          Vitals Value Taken Time   /57 5/31/2019  5:01 PM   Temp 37.1 °C (98.8 °F) 5/31/2019  3:48 AM   Pulse 91 5/31/2019  5:25 PM   Resp 17 5/31/2019  5:25 PM   SpO2 100 % 5/31/2019  5:25 PM   Vitals shown include unvalidated device data.      No case tracking events are documented in the log.      Pain/Ana Rosa Score: No data recorded

## 2019-05-31 NOTE — ASSESSMENT & PLAN NOTE
Monitor fever curve. panculture for temperatures greater than 101 degrees F. Source control: n/a

## 2019-05-31 NOTE — ASSESSMENT & PLAN NOTE
CABG x 5V per CT surgery  Intubated, sedated in ICU today post surgery  Mgmt per CT surgery  Continue ASA, Statin, BB  Resume ARB once OK with CT surgery  Will continue to follow along

## 2019-05-31 NOTE — ASSESSMENT & PLAN NOTE
Insulin IV gtt per CTS Adult Post Surgery Protocol. Stable EUGLYCEMIA . Blood glucose target 100 - 180 mg/dl

## 2019-05-31 NOTE — PLAN OF CARE
Problem: Adult Inpatient Plan of Care  Goal: Plan of Care Review  Outcome: Ongoing (interventions implemented as appropriate)  POC reviewed, verbalized understanding. Uneventful shift overnight. SB-SR on tele monitor, VSS, AAO. Pre-op orders completed for CABG this am. Heparin gtt stopped at 0400 per order. Voiding per urinal, I/Os monitored. Performs ADLs/IADLs independently. Denies pain or SOB this shift. Fall precautions maintained. Wife at bedside. Remains free from injury/falls this shift. Instructed to call for assistance

## 2019-05-31 NOTE — PLAN OF CARE
Problem: Adult Inpatient Plan of Care  Goal: Plan of Care Review  Outcome: Ongoing (interventions implemented as appropriate)  Recommendations     Recommendation/Intervention: 1. Continue current diet 2. Will continue to monitor  Goals: Meet >85% EEN/EPN while admitted  Nutrition Goal Status: new  Communication of RD Recs: POC, sticky note

## 2019-05-31 NOTE — NURSING
Spoke to lazaro about keeping heparin at 14 units since the patient has reached a therapeutic level. Lazaro also stated to stop the heparin at 0400.

## 2019-05-31 NOTE — PT/OT/SLP PROGRESS
Physical Therapy      Patient Name:  Chapin Calderon   MRN:  [id number]    SANDRA LEI INITIATED THIS AM VIA CHART REVIEW, PT IN SX. FOR CABG, WILL COMPLETE SANDRA LEI ONCE PT MEDICALLY APPROPRIATE    Damaris Byers, PT   5/31/2019  0793

## 2019-05-31 NOTE — ANESTHESIA PREPROCEDURE EVALUATION
05/31/2019  Chapin Calderon is a 73 y.o., male.    Anesthesia Evaluation    I have reviewed the Patient Summary Reports.        Review of Systems  Hematology/Oncology:        Current/Recent Cancer. Oncology Comments: H/o Prostate Cancer   Cardiovascular:   Past MI (NSTEMI on admission; Troponin 6.2 on initial labs.  .) CAD    Angina NYHA Classification III ECG has been reviewed. 1 - Normal left ventricular systolic function (EF 60-65%).     2 - Normal left ventricular diastolic function.     3 - Normal right ventricular systolic function .     Echo: LAD 80%; D1 90%; LCx 50%; OM1 90%; PDA 75%     Sodium 136 - 145 mmol/L 135Low   135Low   136    Potassium 3.5 - 5.1 mmol/L 4.2  4.5  3.7    Chloride 95 - 110 mmol/L 100  100  102    CO2 23 - 29 mmol/L 28  29  25    Glucose 70 - 110 mg/dL 92  98  138High     BUN, Bld 8 - 23 mg/dL 13  11  9    Creatinine 0.5 - 1.4 mg/dL 0.9        WBC 3.90 - 12.70 K/uL 9.28  10.99    RBC 4.60 - 6.20 M/uL 4.80  4.59Low     Hemoglobin 14.0 - 18.0 g/dL 14.3  13.7Low     Hematocrit 40.0 - 54.0 % 41.2  39.8Low     Mean Corpuscular Volume 82 - 98 fL 86  87    Mean Corpuscular Hemoglobin 27.0 - 31.0 pg 29.8  29.8    Mean Corpuscular Hemoglobin Conc 32.0 - 36.0 g/dL 34.7  34.4    RDW 11.5 - 14.5 % 12.7  13.1    Platelets 150 - 350 K/uL 208       aPTT 21.0 - 32.0 sec 50.1High      INR 0.8 - 1.2 1.0          Physical Exam  General:  Well nourished    Airway/Jaw/Neck:  Airway Findings: Mouth Opening: Normal Mallampati: II     Eyes/Ears/Nose:  Eyes/Ears/Nose Findings:     Chest/Lungs:  Chest/Lungs Findings: Clear to auscultation     Heart/Vascular:  Heart Findings: Rate: Normal        Mental Status:  Mental Status Findings:  Cooperative         Anesthesia Plan  Type of Anesthesia, risks & benefits discussed:  Anesthesia Type:  general  Patient's Preference:   Intra-op Monitoring Plan:  arterial line, central line, [place], cardiac output and standard ASA monitors  Intra-op Monitoring Plan Comments:   Post Op Pain Control Plan: multimodal analgesia  Post Op Pain Control Plan Comments:   Induction:   IV  Beta Blocker:  Patient is on a Beta-Blocker and has received one dose within the past 24 hours (No further documentation required).       Informed Consent: Patient understands risks and agrees with Anesthesia plan.  Questions answered. Anesthesia consent signed with patient.  ASA Score: 4     Day of Surgery Review of History & Physical: I have interviewed and examined the patient. I have reviewed the patient's H&P dated:  There are no significant changes.          Ready For Surgery From Anesthesia Perspective.

## 2019-05-31 NOTE — CONSULTS
"  [facility] -   Adult Nutrition  Consult Note    SUMMARY     Recommendations    Recommendation/Intervention: 1. Continue current diet 2. Will continue to monitor  Goals: Meet >85% EEN/EPN while admitted  Nutrition Goal Status: new  Communication of RD Recs: POC, sticky note    Reason for Assessment    Reason For Assessment: consult  Diagnosis:   1. NSTEMI (non-ST elevated myocardial infarction)    2. Chest pain    3. Other specified enthesopathies of unspecified lower limb, excluding foot    4. Post-operative state      Relevant Medical History:   Past Medical History:   Diagnosis Date    Prostate cancer      General Information Comments: RD consulted for post-op education (POD #1: CABG). Unable to speak w/ pt as pt was w/ providers at both visit attempts. Pt w/ good intake (100%) per epic review.  NFPE not performed, to be completed at f/u  Nutrition Discharge Planning: Cardiac Diet    Nutrition Risk Screen    Nutrition Risk Screen: no indicators present    Nutrition/Diet History    Spiritual, Cultural Beliefs, Christian Practices, Values that Affect Care: yes    Anthropometrics    Temp: 98.8 °F (37.1 °C)  Height: 5' 7" (170.2 cm)  Height (inches): 67 in  Weight Method: Standard Scale  Weight: 73.6 kg (162 lb 4.1 oz)  Weight (lb): 162.26 lb  Ideal Body Weight (IBW), Male: 148 lb  % Ideal Body Weight, Male (lb): 109.64 lb  BMI (Calculated): 25.5  BMI Grade: 25 - 29.9 - overweight       Lab/Procedures/Meds    Pertinent Labs Reviewed: reviewed  BMP  Lab Results   Component Value Date     (L) 05/31/2019    K 4.2 05/31/2019     05/31/2019    CO2 28 05/31/2019    BUN 13 05/31/2019    CREATININE 0.9 05/31/2019    CALCIUM 9.7 05/31/2019    ANIONGAP 7 (L) 05/31/2019    ESTGFRAFRICA >60 05/31/2019    EGFRNONAA >60 05/31/2019     Lab Results   Component Value Date    CALCIUM 9.7 05/31/2019    PHOS 3.5 05/31/2019     Lab Results   Component Value Date    HGBA1C 5.3 05/30/2019     Recent Labs   Lab " 05/31/19  1505   POCTGLUCOSE 137*     Lab Results   Component Value Date    ALBUMIN 3.6 05/31/2019       Pertinent Medications Reviewed: reviewed    Physical Findings/Assessment         Estimated/Assessed Needs    Weight Used For Calorie Calculations: 73.6 kg (162 lb 4.1 oz)  Energy Calorie Requirements (kcal): 3688-5661  Energy Need Method: kcal/kg (cancer)  Protein Requirements: 73-88g  Weight Used For Protein Calculations: 73.6 kg (162 lb 4.1 oz)     Estimated Fluid Requirement Method: RDA Method(or per MD)  RDA Method (mL): 1714         Nutrition Prescription Ordered    Current Diet Order: Cardiac Diet    Evaluation of Received Nutrient/Fluid Intake          Intake/Output Summary (Last 24 hours) at 5/31/2019 1525  Last data filed at 5/31/2019 1415  Gross per 24 hour   Intake 3033.47 ml   Output 1425 ml   Net 1608.47 ml       % Intake of Estimated Energy Needs: 75 - 100 %  % Meal Intake: %    Nutrition Risk  [id number]    Assessment and Plan  Assessment and Plan  Nutrition Problem  Decreased nutrient needs (na, fat)    Related to (etiology):   Current medical diagnosis    Signs and Symptoms (as evidenced by):   MI  CAD    Interventions/Recommendations (treatment strategy):  See above    Nutrition Diagnosis Status:   New       Monitor and Evaluation    Food and Nutrient Intake: energy intake  Food and Nutrient Adminstration: diet order  Anthropometric Measurements: weight, weight change  Biochemical Data, Medical Tests and Procedures: electrolyte and renal panel, inflammatory profile, gastrointestinal profile, lipid profile, glucose/endocrine profile  Nutrition-Focused Physical Findings: overall appearance     Malnutrition Assessment                                       Nutrition Follow-Up    RD Follow-up?: Yes

## 2019-05-31 NOTE — ASSESSMENT & PLAN NOTE
5/30/19  -LHC completed per Dr. Chase which revealed severe multivessel CAD and CT surgery consulted  -Plans for CABG per Dr. Dorado tomorrow in place  -Continue ASA, Statin, IV heparin gtt, BB  -Unable to add ACEi/ARB for now given soft BP, will attempt pending clinical course  -ECHO revealed EF 60-65%    5/31  -see plan for s/p CABG

## 2019-05-31 NOTE — OP NOTE
[facility] -   Cardiothoracic Surgery  Operative Note    SUMMARY     Date of Procedure: 5/31/2019     Procedure: Procedure(s) (LRB):  CORONARY ARTERY BYPASS GRAFT (CABG) (N/A)  ECHOCARDIOGRAM,TRANSESOPHAGEAL (N/A)  SURGICAL PROCUREMENT, VEIN, ENDOSCOPIC (Bilateral)    Coronary artery bypass grafting x5 with LIMA to LAD and reverse saphenous vein graft to diagonal 1, OM 1 and a sequence reverse saphenous vein graft to the PDA and the FARHAN.  Surgeon(s) and Role:     * Mani Dorado MD - Primary     * Duane Carpenter NP - Assisting        Pre-Operative Diagnosis: Chest pain [R07.9]  NSTEMI (non-ST elevated myocardial infarction) [I21.4]    Post-Operative Diagnosis: Post-Op Diagnosis Codes:     * Chest pain [R07.9]     * NSTEMI (non-ST elevated myocardial infarction) [I21.4]    Anesthesia: General    Technical Procedures Used:  The patient was prepped and draped sterilely.  A median sternotomy incision was then made which was carried down sharply to the sternum was encountered.  The sternum was then opened with a sternal stool.  The left half of the sternum was elevated with a mammary retractor.  The left internal mammary artery was then dissected down as a pedicle.  While this was being performed a 2nd member of the team was harvesting the greater saphenous vein from the patient's left leg. Examination of the vein below the knee shows that the vein was too small for bypass.  Therefore attention was switched to the right lower extremity where endoscopic vein harvesting was also perform to the greater saphenous vein. Again the greater saphenous vein was found to be of diminished caliber especially below the knee.  Next the pericardium was then opened. Pericardial sutures were placed. The patient was given heparin.  An epiaortic ultrasound was then used to interrogate the ascending aorta.  The ascending aorta was found to be free of intraluminal or mobile atheroma.  There was extensive atheromatous in  the area of the order around the sino-tubular junction. The aortic pursestring was then placed in the distal ascending aorta.  The venous pursestring was placed in the right atrial appendage.  The retrograde cardioplegia pursestring was placed in the free wall of the right atrium.  And the antegrade cardioplegia cannula was placed in the mid ascending aorta.  Therapeutic ACT was then confirmed.  The patient was then cannulated with a 21 [place] aortic cannula.  A 3 way venous cannula was then placed in the IVC.  And the retrograde cardioplegic cannula was placed in the coronary sinus.  The antegrade cardioplegia cannula was then placed in the mid ascending aorta.  The patient was then started on cardiopulmonary bypass.  The aorta was then crossclamped.  The patient was then arrested with antegrade warm blood cardioplegia.  Throughout the procedure the patient's heart was continuously perfused with retrograde cold blood.  This was intermittently supplemented with antegrade cold blood cardioplegia.  First attention was drawn to the PDA where the proximal anastomosis of the sequence graft was then performed to the PDA as side-to-side anastomosis using 7 0 continuous Prolene sutures.  Next attention was drawn to the FARHAN.  A FARHAN branch was then identified in the epicardium.  This was then dissected out and found to be about 1.5 mm in diameter.  The distal anastomosis of the sequenced graft was then performed to the FARHAN using 7 0 continuous Prolene sutures as an end-to-side anastomosis.  Next attention was drawn to the aorta with 4.4 punch aortotomy was made.  The proximal anastomosis was then performed to the aorta using 6 0 continuous Prolene sutures.  Next attention was drawn to the OM 1.  Which was identified. The distal anastomosis of the reverse saphenous vein grafts were then performed to the OM warmed using 7 0 continuous Prolene sutures as an end-to-side anastomosis. Next attention was drawn to the aorta with 4.4  punch aortotomy was made.  The proximal anastomosis of the reverse saphenous vein graft was then performed to the aorta using 6 0 continuous Prolene sutures.  Next attention was drawn to the diagonal 1 which was identified and opened. The distal anastomosis of the reverse saphenous vein graft was then performed to the diagonal using 7 0 continuous Prolene sutures as a end-to-side anastomosis.  Next attention was drawn to the aorta with 4.4 punch aortotomy was made.  The proximal anastomosis of the reverse saphenous vein graft were then performed using 6 0 continuous Prolene sutures.  Next attention was drawn to the mid LAD.  Which was identified and opened.  The LIMA was then anastomosed to the mid LAD as an end-to-side anastomosis using 8 0 continuous Prolene sutures. By this time the patient had been rewarming.  The patient was given hotshot cardioplegia.  The aortic cross-clamp was then removed. Atrial and ventricular pacemaker wires were placed. Two mediastinal and bilateral pleural tubes were placed. Ventilation was restarted.  The patient was then weaned from cardiopulmonary bypass without any problems ventilation was restarted.  The patient was then weaned from cardiopulmonary bypass without any problems.  Surgical sites were inspected and found to be free of bleeding. All the grafts were interrogated with a Doppler probe.  All the grafts had normal triphasic signals.  The patient was then decannulated.  Heparin was reversed with protamine.   Surgical sites were inspected and found to be free of bleeding. The grafts were again interrogated with a Doppler probe.  The grafts had good triphasic Doppler signals.  The patient's chest was then closed with figure-of-eight sternal wires.  The fascia was closed with 0 Vicryl sutures.  And the skin was closed with 4 Monocryl sutures.    Description of the Findings of the Procedure:  See description above.    Significant Surgical Tasks Conducted by the Assistant(s), if  Applicable:  Bilateral endoscopic vein harvesting of the greater saphenous veins.    Complications: No    Estimated Blood Loss (EBL):  750 mL           Implants: * No implants in log *    Specimens:   Specimen (12h ago, onward)    None                  Condition: Stable    Disposition: ICU - intubated and hemodynamically stable.    Attestation: I performed the procedure.

## 2019-05-31 NOTE — ASSESSMENT & PLAN NOTE
[x]        Exercise-based cardiac rehabilitation   [x]        Weight management is recommended by the AHA for the secondary prevention  []        Smoking cessation counseling.  [x]        Treatment of High Blood Pressure   (goal of < 140/90 mm Hg // < 130/80 mm Hg in patients with DM or CKD)  [x]        Beta-blocker therapy.  [x]        Aspirin therapy (81 to 162 mg daily)   [x]        Statin therapy.   [x]        ACE inhibitor therapy.  [x]        Antiplatelet Agent therapy.  [x]        Influenza Vaccination.

## 2019-05-31 NOTE — NURSING
Lab called to report large differences in CBC levels from pre-op to post-op, but no criticals, reported to Dr. Dorado who is at bedside; he stated they were expected.

## 2019-05-31 NOTE — SUBJECTIVE & OBJECTIVE
Interval History:  Returned to ICU after 5 vessel CABG by Dr. Dorado.  Intubated and sedated.    Review of Systems   Unable to perform ROS: Intubated   Constitutional: Negative.  Negative for chills and fever.   HENT: Negative.  Negative for congestion and sore throat.    Eyes: Negative.  Negative for visual disturbance.   Respiratory: Negative.  Negative for cough, shortness of breath and wheezing.    Cardiovascular: Negative.  Negative for chest pain.   Gastrointestinal: Negative for abdominal pain, diarrhea, nausea and vomiting.   Endocrine: Negative.    Genitourinary: Negative.    Musculoskeletal: Negative.  Negative for myalgias and neck stiffness.   Skin: Negative.  Negative for color change and pallor.   Allergic/Immunologic: Negative.    Neurological: Negative.    Hematological: Negative.    Psychiatric/Behavioral: Negative.    All other systems reviewed and are negative.    Objective:     Vital Signs (Most Recent):  Temp: 98.8 °F (37.1 °C) (05/31/19 0348)  Pulse: 91 (05/31/19 1600)  Resp: 18 (05/31/19 1600)  BP: (!) 100/55 (05/31/19 1600)  SpO2: 100 % (05/31/19 1600) Vital Signs (24h Range):  Temp:  [98.7 °F (37.1 °C)-98.9 °F (37.2 °C)] 98.8 °F (37.1 °C)  Pulse:  [51-91] 91  Resp:  [16-27] 18  SpO2:  [96 %-100 %] 100 %  BP: ()/(46-71) 100/55     Weight: 73.6 kg (162 lb 4.1 oz)  Body mass index is 25.41 kg/m².    Intake/Output Summary (Last 24 hours) at 5/31/2019 1639  Last data filed at 5/31/2019 1415  Gross per 24 hour   Intake 3033.47 ml   Output 1225 ml   Net 1808.47 ml      Physical Exam   Constitutional: He appears well-developed and well-nourished. No distress.   HENT:   Head: Normocephalic and atraumatic.   Mouth/Throat: Oropharynx is clear and moist.   Eyes: Pupils are equal, round, and reactive to light. Conjunctivae and EOM are normal.   Neck: No JVD present. No thyromegaly present.   Cardiovascular: Normal rate, regular rhythm and normal heart sounds. Exam reveals no gallop and no friction  rub.   No murmur heard.  2 Pleural and 2 Mediastinal drains with sero-sanguinous fluid.  Sternotomy incision dressed clean and intact.  Paced rhythm.  PA catheter in right IJV.   Pulmonary/Chest: Effort normal and breath sounds normal. No respiratory distress. He has no wheezes. He has no rales.   Abdominal: Soft. Bowel sounds are normal. He exhibits no distension. There is no tenderness. There is no rebound and no guarding.   Genitourinary:   Genitourinary Comments: Higgins catheter in place with clear yellow urine.   Musculoskeletal: Normal range of motion. He exhibits no edema or tenderness.   Lymphadenopathy:     He has no cervical adenopathy.   Neurological: He has normal reflexes. He displays normal reflexes. No cranial nerve deficit.   Intubated and sedated.   Skin: Skin is warm and dry. No rash noted. He is not diaphoretic. No erythema.   Vein graft site dressed clean and intact.       Significant Labs: All pertinent labs within the past 24 hours have been reviewed.    Significant Imaging: I have reviewed all pertinent imaging results/findings within the past 24 hours.

## 2019-05-31 NOTE — CONSULTS
[facility] -   Critical Care Medicine  Consult Note    Patient Name: Chapin Calderon  MRN: [id number]  Admission Date: 5/29/2019  Hospital Length of Stay: 2 days  Code Status: Full Code  Attending Physician: Mani Dorado MD   Primary Care Provider: Tiffanie Vicente MD   Principal Problem: On mechanically assisted ventilation      Subjective:     HPI:  73 yrar old young who  has a past medical history of Prostate cancer and no prior history of CAD admitted to NSTEMI. LHC revealed  severe multivessel coronary artery disease including a critical proximal LAD stenosis. Underwent CABG X 5 today . Has been admitted to the MICU for after care.     Hospital/ICU Course:  05/31: Seen and examined at bedside in the MICU post surgery. Hospital chart reviewed. No acute interval detrimental events noted.    Past Medical History:   Diagnosis Date    Prostate cancer        Past Surgical History:   Procedure Laterality Date    APPENDECTOMY         Review of patient's allergies indicates:   Allergen Reactions    Penicillins Rash     Patient tolerated Ancef on 05/31/19       Scheduled Meds:   albuterol-ipratropium  3 mL Nebulization Q4H    [START ON 6/1/2019] aspirin  81 mg Oral Daily    atorvastatin  80 mg Oral Daily    calcium gluconate        calcium gluconate IVPB  2,000 mg Intravenous Once    ceFAZolin (ANCEF) IVPB  2 g Intravenous Q8H    chlorhexidine  10 mL Mouth/Throat BID    [START ON 6/1/2019] clopidogrel  75 mg Oral Daily    [START ON 6/1/2019] furosemide  40 mg Intravenous BID    [START ON 6/1/2019] metoprolol tartrate  25 mg Oral BID    nozaseptin   Each Nare BID    [START ON 6/1/2019] pantoprazole  40 mg Intravenous Daily    [START ON 6/1/2019] polyethylene glycol  17 g Oral Daily    [START ON 6/1/2019] potassium chloride  20 mEq Oral Q12H     Continuous Infusions:   dexmedetomidine (PRECEDEX) infusion 1 mcg/kg/hr (05/31/19 1542)    dextrose 5% lactated ringers 25 mL/hr at 05/31/19 1536     epinephrine infusion      insulin (HUMAN R) infusion (adults)      niCARdipine      vasopressin (PITRESSIN) infusion 0.04 Units/min (05/31/19 1510)     PRN Meds:.sodium chloride, acetaminophen, albumin human 5%, ceFAZolin (ANCEF) IVPB, chlorhexidine, dextrose 50%, dextrose 50%, fentaNYL, fentaNYL, heparin (PORCINE), heparin (PORCINE), insulin (HUMAN R) infusion (adults), lactated ringers, magnesium sulfate IVPB, metoclopramide HCl, metoprolol tartrate, nozaseptin, ondansetron, ondansetron, oxyCODONE, oxyCODONE, potassium chloride in water **AND** potassium chloride in water **AND** potassium chloride in water       Family History     None        Tobacco Use    Smoking status: Former Smoker    Smokeless tobacco: Never Used   Substance and Sexual Activity    Alcohol use: No    Drug use: No    Sexual activity: Not on file         Review of Systems   Unable to perform ROS: Intubated     Objective:     Vital Signs (Most Recent):  Temp: 98.8 °F (37.1 °C) (05/31/19 0348)  Pulse: 91 (05/31/19 1600)  Resp: 18 (05/31/19 1600)  BP: (!) 100/55 (05/31/19 1600)  SpO2: 100 % (05/31/19 1600) Vital Signs (24h Range):  Temp:  [98.7 °F (37.1 °C)-98.9 °F (37.2 °C)] 98.8 °F (37.1 °C)  Pulse:  [51-91] 91  Resp:  [16-27] 18  SpO2:  [96 %-100 %] 100 %  BP: ()/(46-71) 100/55     Weight: 73.6 kg (162 lb 4.1 oz)  Body mass index is 25.41 kg/m².      Intake/Output Summary (Last 24 hours) at 5/31/2019 1636  Last data filed at 5/31/2019 1415  Gross per 24 hour   Intake 3033.47 ml   Output 1225 ml   Net 1808.47 ml       Physical Exam   Constitutional: He is oriented to person, place, and time. He appears well-developed and well-nourished. No distress.   HENT:   Head: Normocephalic and atraumatic.   Eyes: Pupils are equal, round, and reactive to light. EOM are normal.   Neck: Normal range of motion. Neck supple. No JVD present.   Cardiovascular: Normal rate, regular rhythm and intact distal pulses. Exam reveals no gallop and no  friction rub.   No murmur heard.  Pulmonary/Chest: Effort normal and breath sounds normal. No stridor. No respiratory distress. He has no wheezes. He has no rales. He exhibits no tenderness.   Abdominal: Soft. Bowel sounds are normal. He exhibits no distension. There is no tenderness. There is no guarding.   Musculoskeletal: Normal range of motion. He exhibits no edema, tenderness or deformity.   Neurological: He is alert and oriented to person, place, and time. He displays normal reflexes. No cranial nerve deficit or sensory deficit. He exhibits normal muscle tone. Coordination normal.   Skin: Skin is warm and dry. Capillary refill takes less than 2 seconds. No rash noted. He is not diaphoretic. No erythema. No pallor.   Psychiatric: He has a normal mood and affect. His behavior is normal. Judgment and thought content normal.   Nursing note and vitals reviewed.      Vents:  Vent Mode: A/C (05/31/19 1540)  Set Rate: 20 bmp (05/31/19 1540)  Vt Set: 400 mL (05/31/19 1540)  Pressure Support: 0 cmH20 (05/31/19 1540)  PEEP/CPAP: 5 cmH20 (05/31/19 1540)  Oxygen Concentration (%): 50 (05/31/19 1600)  Peak Airway Pressure: 19 cmH2O (05/31/19 1540)  Plateau Pressure: 0 cmH20 (05/31/19 1540)  Total Ve: 9.47 mL (05/31/19 1540)  F/VT Ratio<105 (RSBI): (!) 61.09 (05/31/19 1540)    Lines/Drains/Airways     Central Venous Catheter Line                 Introducer 05/31/19 1039 right internal jugular less than 1 day         Percutaneous Central Line Insertion/Assessment - double lumen  05/31/19 0735 right internal jugular less than 1 day         Pulmonary Artery Catheter Assessment  05/31/19 1041 right internal jugular less than 1 day          Drain                 Closed/Suction Drain 05/24/19 1002 Left Leg Bulb 7 days         Closed/Suction Drain 05/31/19 1103 Right Leg Bulb 19 Fr. less than 1 day         Closed/Suction Drain 05/31/19 1105 Left Leg Bulb 19 Fr. less than 1 day         Urethral Catheter 05/31/19 2971  Latex;Straight-tip;Temperature probe 16 Fr. less than 1 day         Y Chest Tube 1 and 2 05/31/19 1302 1 Right Mediastinal 24 Fr. 2 Left Mediastinal 24 Fr. less than 1 day         Y Chest Tube 3 and 4 05/31/19 1303 3 Right Pleural 24 Fr. Left Pleural 24 Fr. less than 1 day          Airway                 Airway - Non-Surgical 05/31/19 0717 Endotracheal Tube less than 1 day          Arterial Line                 Arterial Line 05/31/19 0708 Right Radial less than 1 day          Peripheral Intravenous Line                 Peripheral IV - Single Lumen 05/29/19 0820 20 G Left Forearm 2 days                Significant Labs:    CBC/Anemia Profile:  Recent Labs   Lab 05/31/19  0543  05/31/19  1356 05/31/19  1456 05/31/19  1505   WBC 9.28  --  19.37* 18.99*  --    HGB 14.3  --  9.4* 9.0*  --    HCT 41.2   < > 26.4* 25.1* 22*     --  94*  94* 101*  --    MCV 86  --  86 85  --    RDW 12.7  --  12.8 12.7  --     < > = values in this interval not displayed.        Chemistries:  Recent Labs   Lab 05/30/19  0422 05/31/19  0543 05/31/19  1456   * 135* 138  138   K 4.5 4.2 4.2  4.2    100 106  106   CO2 29 28 22*  22*   BUN 11 13 13  13   CREATININE 0.8 0.9 1.0  1.0   CALCIUM 9.4 9.7 9.1  9.1   ALBUMIN 3.5 3.6  --    PROT 6.1 6.5  --    BILITOT 0.6 0.7  --    ALKPHOS 73 75  --    ALT 13 14  --    AST 39 27  --    MG 1.9 2.1 3.8*   PHOS 3.0 3.5  --        ABGs:   Recent Labs   Lab 05/31/19  1505   PH 7.403   PCO2 46.5*   HCO3 29.0*   POCSATURATED 100   BE 4     Coagulation:   Recent Labs   Lab 05/31/19  1456   INR 1.2   APTT 29.4     POCT Glucose:   Recent Labs   Lab 05/31/19  1505   POCTGLUCOSE 137*     Prealbumin:   Recent Labs   Lab 05/30/19  1206   PREALBUMIN 19*       Significant Imaging:       Chest x-ray: Tubes and lines are satisfactory for postoperative exam with endotracheal tube in satisfactory position.  [place] catheter noted.  Extensive catheters and tubes overlie the chest.  No gross  pneumothorax.  Mild atelectasis at the lung bases.  No large pleural effusion.  Mild cardiomegaly.  Sternotomy wires are noted.              ABG  Recent Labs   Lab 05/31/19  1505   PH 7.403   PO2 364*   PCO2 46.5*   HCO3 29.0*   BE 4     Assessment/Plan:     I have reviewed all labs and imaging studies and compared to previous results. I have also discussed labs with all the teams in the medical care of the patient and my plan is outlined below     Neuro  .  SHORT TERM SEDATION:     []        Midazolam  []        Propofol  [x]        Dexmedetomidine     ANALGESIA:     []        Morphine   []        Fentanyl  []        Hydromorphone.         RASS  => - 2     Neurochecks per routine.  Daily sedation vacation    Pulmonary  * On mechanically assisted ventilation  Pulmonary:  Continue ventilator support.      Mode: ASSIST CONTROL    Rate: 20 bpm   Tidal Volume  5-6 cc/kg/IBW   PEEP 5.0 CM/H2O cmH2O/Optimal Peep   FiO2% 100 ks greater than 92%       Ventilator settings reviewed and adjusted to optimize gas exchange.     Proceed with weaning trials on a support/weaning mode on the ventilator when the following goals are met:  Patient Criteria:  - Spontaneous breathing in CPAP  - Able to lift head off pillow  - Hemodynamic stability  - HR: 60 to 110 bpm  - ABP: greater than 90/50  - RR: 10 to 28 bpm  - Secretion management/adequate cough  - Acceptable arterial blood gases    Ventilator Criteria:  - FiO2: less than or equal to 40%  - PEEP less than or equal to 8 to 10 cm H2O    Bronchodilators per protocol.           Cardiac/Vascular  S/P CABG (coronary artery bypass graft)  POD # 1:     Paced: Mode AAI, Rate 90, A mA 20, V mA 25:  normal pacemaker function and stable pacing and sensing thresholds ; Multiple drains: Monitor output.     STERNAL INCISION: staples in place; wound clean, dry, and intact,     PAC: STABLE HEMODYNAMICS.    Monitor hemodynamics and  monitor for dysrhythmias MAP goal of  65 mmHg.      CARDIAC STERNAL  PRECAUTIONS.       CARDIOACTIVE MEDS:   EPINEPHRINE, VASOPRESSIN, CARDENE      DIURETICS:  FUROSEMIDE      Coronary artery disease involving native coronary artery of native heart with angina pectoris      [x]        Exercise-based cardiac rehabilitation   [x]        Weight management is recommended by the AHA for the secondary prevention  []        Smoking cessation counseling.  [x]        Treatment of High Blood Pressure   (goal of < 140/90 mm Hg // < 130/80 mm Hg in patients with DM or CKD)  [x]        Beta-blocker therapy.  [x]        Aspirin therapy (81 to 162 mg daily)   [x]        Statin therapy.   [x]        ACE inhibitor therapy.  [x]        Antiplatelet Agent therapy.  [x]        Influenza Vaccination.      NSTEMI (non-ST elevated myocardial infarction)  LHC: Severe multivessel CAD, Normal LVEF  ECHO revealed EF 60-65%   S/P Revascularization:     Renal/  Monitor BUN / Cr. [place] urine output. Monitor and replete electrolytes per protocol.    ID  Monitor fever curve. panculture for temperatures greater than 101 degrees F. Source control: n/a      Hematology  Monitor hemogram. Transfuse as needed.    Oncology  Leukocytosis  Likely stress margination: Monitor and trend.     Endocrine  Insulin IV gtt per CTS Adult Post Surgery Protocol. Stable EUGLYCEMIA . Blood glucose target 100 - 180 mg/dl        GI   NPO for now. Stress ulcer precautions. GI regimen:  PRN anti emetics, anti diarrheals and stool softeners. Diet Adult Regular (IDDSI Level 7) [facility]; Cardiac (Low Na/Chol) when able ( post extubation)            Critical Care Daily Checklist:    A: Awake: RASS Goal/Actual Goal:  0  Actual:  0   B: Spontaneous Breathing Trial Performed?  Per protocol   C: SAT & SBT Coordinated?  yes                   D: Delirium: CAM-ICU     E: Early Mobility Performed? Yes   F: Feeding Goal: Goals: Meet >85% EEN/EPN while admitted  Status: Nutrition Goal Status: new   Current Diet Order   Procedures    Diet Adult  Regular (IDDSI Level 7) [facility]; Cardiac (Low Na/Chol)     Order Specific Question:   Indicate patient location for additional diet options:     Answer:   [facility]     Order Specific Question:   Additional Diet Options:     Answer:   Cardiac (Low Na/Chol)      AS: Analgesia/Sedation OXYCODONE, FENTANYL   T: Thromboembolic Prophylaxis HEPARIN   H: HOB > 300 Yes   U: Stress Ulcer Prophylaxis (if needed) FAMOTIDINE   G: Glucose Control INSULNI IV GTT   B: Bowel Function     I: Indwelling Catheter (Lines & Higgins) Necessity YES   D: De-escalation of Antimicrobials/Pharmacotherapies YES    Plan for the day/ETD Contiue current    Code Status:  Family/Goals of Care: Full Code  Pending course     Critical Care Time: 60 minutes  Critical secondary to Patient has a condition that poses threat to life and bodily function: NSTEMI s/p CABG X 5.      Critical care was time spent personally by me on the following activities: development of treatment plan with patient or surrogate and bedside caregivers, discussions with consultants, evaluation of patient's response to treatment, examination of patient, ordering and performing treatments and interventions, ordering and review of laboratory studies, ordering and review of radiographic studies, pulse oximetry, re-evaluation of patient's condition. This critical care time did not overlap with that of any other provider or involve time for any procedures.    Thank you for your consult. I will follow-up with patient. Please contact us if you have any additional questions.     Dann Mcclure MD  Critical Care Medicine  [facility] -

## 2019-05-31 NOTE — HPI
73 yrar old young who  has a past medical history of Prostate cancer and no prior history of CAD admitted to NSTEMI. LHC revealed  severe multivessel coronary artery disease including a critical proximal LAD stenosis. Underwent CABG X 5 today . Has been admitted to the MICU for after care.

## 2019-05-31 NOTE — PROGRESS NOTES
[facility] Hospital Medicine  Progress Note    Patient Name: Chapin Calderon  MRN: [id number]  Patient Class: IP- Inpatient   Admission Date: 5/29/2019  Length of Stay: 2 days  Attending Physician: Mani Dorado MD  Primary Care Provider: Tiffanie Vicente MD        Subjective:     Principal Problem:NSTEMI (non-ST elevated myocardial infarction)    HPI:  Chapin Calderon is a 73 year old male with a past medical history of prostate cancer who presented to the ED with complaints of chest pain that began one day prior to presentation. The pain is described as a constant pressure in his left chest that rates a 6/10 on a pain scale. The patient denies radiation and associated symptoms including SOB, nausea, vomiting, diaphoresis and lower extremity edema. He tried Tums with no relieved. However, symptoms resolved shortly after receiving an Asprin in the ED. In the ED, the patient's initial troponin was 6.324 with an increase to 7.465. Code status was discussed with the patient. He is a full code. His wife, Daniela Calderon, is his surrogate medical decision maker.      Hospital Course:  [facility] revealed multi-vessel disease.  Evaluation by CV Surgery with a recommendation for CABG.  Successful CABG 31 May.  Post-op in ICU.    Interval History:  Returned to ICU after 5 vessel CABG by Dr. Dorado.  Intubated and sedated.    Review of Systems   Unable to perform ROS: Intubated   Constitutional: Negative.  Negative for chills and fever.   HENT: Negative.  Negative for congestion and sore throat.    Eyes: Negative.  Negative for visual disturbance.   Respiratory: Negative.  Negative for cough, shortness of breath and wheezing.    Cardiovascular: Negative.  Negative for chest pain.   Gastrointestinal: Negative for abdominal pain, diarrhea, nausea and vomiting.   Endocrine: Negative.    Genitourinary: Negative.    Musculoskeletal: Negative.  Negative for myalgias and neck stiffness.   Skin: Negative.  Negative for  color change and pallor.   Allergic/Immunologic: Negative.    Neurological: Negative.    Hematological: Negative.    Psychiatric/Behavioral: Negative.    All other systems reviewed and are negative.    Objective:     Vital Signs (Most Recent):  Temp: 98.8 °F (37.1 °C) (05/31/19 0348)  Pulse: 91 (05/31/19 1600)  Resp: 18 (05/31/19 1600)  BP: (!) 100/55 (05/31/19 1600)  SpO2: 100 % (05/31/19 1600) Vital Signs (24h Range):  Temp:  [98.7 °F (37.1 °C)-98.9 °F (37.2 °C)] 98.8 °F (37.1 °C)  Pulse:  [51-91] 91  Resp:  [16-27] 18  SpO2:  [96 %-100 %] 100 %  BP: ()/(46-71) 100/55     Weight: 73.6 kg (162 lb 4.1 oz)  Body mass index is 25.41 kg/m².    Intake/Output Summary (Last 24 hours) at 5/31/2019 1639  Last data filed at 5/31/2019 1415  Gross per 24 hour   Intake 3033.47 ml   Output 1225 ml   Net 1808.47 ml      Physical Exam   Constitutional: He appears well-developed and well-nourished. No distress.   HENT:   Head: Normocephalic and atraumatic.   Mouth/Throat: Oropharynx is clear and moist.   Eyes: Pupils are equal, round, and reactive to light. Conjunctivae and EOM are normal.   Neck: No JVD present. No thyromegaly present.   Cardiovascular: Normal rate, regular rhythm and normal heart sounds. Exam reveals no gallop and no friction rub.   No murmur heard.  2 Pleural and 2 Mediastinal drains with sero-sanguinous fluid.  Sternotomy incision dressed clean and intact.  Paced rhythm.  PA catheter in right IJV.   Pulmonary/Chest: Effort normal and breath sounds normal. No respiratory distress. He has no wheezes. He has no rales.   Abdominal: Soft. Bowel sounds are normal. He exhibits no distension. There is no tenderness. There is no rebound and no guarding.   Genitourinary:   Genitourinary Comments: Higgins catheter in place with clear yellow urine.   Musculoskeletal: Normal range of motion. He exhibits no edema or tenderness.   Lymphadenopathy:     He has no cervical adenopathy.   Neurological: He has normal reflexes.  He displays normal reflexes. No cranial nerve deficit.   Intubated and sedated.   Skin: Skin is warm and dry. No rash noted. He is not diaphoretic. No erythema.   Vein graft site dressed clean and intact.       Significant Labs: All pertinent labs within the past 24 hours have been reviewed.    Significant Imaging: I have reviewed all pertinent imaging results/findings within the past 24 hours.    Assessment/Plan:      * NSTEMI (non-ST elevated myocardial infarction)  Continue ASA, heparin, metoprolol and Lipitor.  Echocardiogram and lipid panel pending.  Patient was evaluated for Cardiac Rehab and is not a candidate at this time.  LHC revealed multi-vessel disease.  Dr. Dorado performed 5 vessel CABG on 31 May.    Leukocytosis  -Likely reactive.   -Continue to monitor for signs or symptoms of infection.         VTE Risk Mitigation (From admission, onward)        Ordered     IP VTE LOW RISK PATIENT  Once      05/30/19 0752     Place SKY hose  Until discontinued      05/30/19 0752     Place sequential compression device  Until discontinued      05/30/19 0752     heparin 25,000 units in dextrose 5% 250 mL (100 units/mL) infusion LOW INTENSITY nomogram - [facility]  Continuous      05/29/19 2001     Reason for no Mechanical VTE Prophylaxis  Once      05/29/19 1431     heparin 25,000 units in dextrose 5% (100 units/ml) IV bolus from bag - ADDITIONAL PRN BOLUS - 60 units/kg (max bolus 4000 units)  As needed (PRN)      05/29/19 0938     heparin 25,000 units in dextrose 5% (100 units/ml) IV bolus from bag - ADDITIONAL PRN BOLUS - 30 units/kg (max bolus 4000 units)  As needed (PRN)      05/29/19 0938          Critical care time spent on the evaluation and treatment of severe organ dysfunction, review of pertinent labs and imaging studies, discussions with consulting providers and discussions with patient/family: 30 minutes.    Orville Montero MD  Department of Hospital Medicine   [facility] -

## 2019-05-31 NOTE — PROGRESS NOTES
The patient is a 73-year-old male who was worked up for an NSTEMI.  Cardiac catheterization shows severe multivessel coronary artery disease including a critical proximal LAD stenosis.  The patient is preop for urgent coronary artery bypass grafting.  The risks and benefits of surgery were explained to the patient and his family.  The risks include but not limited to death, stroke, MI, CHF, bleeding, infection, renal failure, and prolonged ventilation.  The patient understands the risks and benefits of surgery and has agreed to proceed with coronary artery bypass grafting.

## 2019-05-31 NOTE — PT/OT/SLP PROGRESS
Occupational Therapy      Patient Name:  Chapin Calderon   MRN:  [id number]    HERSON LEI INITIATED THIS AM VIA CHART REVIEW, PT IN SX. FOR CABG, WILL COMPLETE O.KADIE LEI ONCE PT MEDICALLY APPROPRIATE.       Elisabeth Mcleod, PT/OT  5/31/2019

## 2019-05-31 NOTE — ASSESSMENT & PLAN NOTE
NPO for now. Stress ulcer precautions. GI regimen:  PRN anti emetics, anti diarrheals and stool softeners. Diet Adult Regular (IDDSI Level 7) [facility]; Cardiac (Low Na/Chol) when able ( post extubation)

## 2019-05-31 NOTE — SUBJECTIVE & OBJECTIVE
Interval History: no acute issues noted o/n. CABG x 5V per CT Surgery today. Remains intubated and sedated post surgery today in ICU>    Review of Systems   Unable to perform ROS: intubated     Objective:     Vital Signs (Most Recent):  Temp: 98.8 °F (37.1 °C) (05/31/19 0348)  Pulse: 91 (05/31/19 1600)  Resp: 18 (05/31/19 1600)  BP: (!) 100/55 (05/31/19 1600)  SpO2: 100 % (05/31/19 1600) Vital Signs (24h Range):  Temp:  [98.7 °F (37.1 °C)-98.9 °F (37.2 °C)] 98.8 °F (37.1 °C)  Pulse:  [51-91] 91  Resp:  [16-27] 18  SpO2:  [96 %-100 %] 100 %  BP: ()/(46-71) 100/55     Weight: 73.6 kg (162 lb 4.1 oz)  Body mass index is 25.41 kg/m².     SpO2: 100 %  O2 Device (Oxygen Therapy): ventilator      Intake/Output Summary (Last 24 hours) at 5/31/2019 1611  Last data filed at 5/31/2019 1415  Gross per 24 hour   Intake 3033.47 ml   Output 1225 ml   Net 1808.47 ml       Lines/Drains/Airways     Central Venous Catheter Line                 Introducer 05/31/19 1039 right internal jugular less than 1 day         Percutaneous Central Line Insertion/Assessment - double lumen  05/31/19 0735 right internal jugular less than 1 day         Pulmonary Artery Catheter Assessment  05/31/19 1041 right internal jugular less than 1 day          Drain                 Closed/Suction Drain 05/24/19 1002 Left Leg Bulb 7 days         Closed/Suction Drain 05/31/19 1103 Right Leg Bulb 19 Fr. less than 1 day         Closed/Suction Drain 05/31/19 1105 Left Leg Bulb 19 Fr. less than 1 day         Urethral Catheter 05/31/19 0725 Latex;Straight-tip;Temperature probe 16 Fr. less than 1 day         Y Chest Tube 1 and 2 05/31/19 1302 1 Right Mediastinal 24 Fr. 2 Left Mediastinal 24 Fr. less than 1 day         Y Chest Tube 3 and 4 05/31/19 1303 3 Right Pleural 24 Fr. Left Pleural 24 Fr. less than 1 day          Airway                 Airway - Non-Surgical 05/31/19 0717 Endotracheal Tube less than 1 day          Arterial Line                 Arterial Line  05/31/19 0708 Right Radial less than 1 day          Peripheral Intravenous Line                 Peripheral IV - Single Lumen 05/29/19 0820 20 G Left Forearm 2 days                Physical Exam   Constitutional: He is oriented to person, place, and time. He appears well-developed and well-nourished. No distress. He is sedated and intubated.   HENT:   Head: Normocephalic and atraumatic.   Eyes: Pupils are equal, round, and reactive to light.   Neck: Neck supple. No JVD present.   RT IJ PA Cath   Cardiovascular: Normal rate, regular rhythm, S1 normal, S2 normal and intact distal pulses. PMI is not displaced. Exam reveals no distant heart sounds.   No murmur heard.  Pulses:       Radial pulses are 2+ on the right side, and 2+ on the left side.        Dorsalis pedis pulses are 1+ on the right side, and 1+ on the left side.   Left radial access site C/D/I, no hematoma or ecchymosis noted. No drainage or signs of infection noted today.     Mid-Sternal CABG incision bloody drainage noted to dressing today.   Pacer wires secured, rate 90 bpm, AAI  2 Pleural drains, 2 Mediastinal drains in place    LLE and RLE ACE wrap in place   Pulmonary/Chest: Effort normal and breath sounds normal. No accessory muscle usage. He is intubated. No respiratory distress. He has no decreased breath sounds. He has no wheezes. He has no rales.   Abdominal: Soft. Bowel sounds are normal. He exhibits no distension. There is no tenderness.   Musculoskeletal: Normal range of motion. He exhibits no edema.        Right ankle: He exhibits no swelling.        Left ankle: He exhibits no swelling.   Neurological: He is oriented to person, place, and time.   Skin: Skin is warm and dry. He is not diaphoretic. No cyanosis. Nails show no clubbing.   Psychiatric: He has a normal mood and affect. His speech is normal and behavior is normal. Judgment and thought content normal. Cognition and memory are normal.   Nursing note and vitals reviewed.      Significant  Labs:   BMP:   Recent Labs   Lab 05/30/19  0422 05/31/19  0543 05/31/19  1456   GLU 98 92 131*  131*   * 135* 138  138   K 4.5 4.2 4.2  4.2    100 106  106   CO2 29 28 22*  22*   BUN 11 13 13  13   CREATININE 0.8 0.9 1.0  1.0   CALCIUM 9.4 9.7 9.1  9.1   MG 1.9 2.1 3.8*   , CMP   Recent Labs   Lab 05/30/19  0422 05/31/19  0543 05/31/19  1456   * 135* 138  138   K 4.5 4.2 4.2  4.2    100 106  106   CO2 29 28 22*  22*   GLU 98 92 131*  131*   BUN 11 13 13  13   CREATININE 0.8 0.9 1.0  1.0   CALCIUM 9.4 9.7 9.1  9.1   PROT 6.1 6.5  --    ALBUMIN 3.5 3.6  --    BILITOT 0.6 0.7  --    ALKPHOS 73 75  --    AST 39 27  --    ALT 13 14  --    ANIONGAP 6* 7* 10  10   ESTGFRAFRICA >60 >60 >60  >60   EGFRNONAA >60 >60 >60  >60   , CBC   Recent Labs   Lab 05/31/19  0543  05/31/19  1356 05/31/19  1456 05/31/19  1505   WBC 9.28  --  19.37* 18.99*  --    HGB 14.3  --  9.4* 9.0*  --    HCT 41.2   < > 26.4* 25.1* 22*     --  94*  94* 101*  --     < > = values in this interval not displayed.   , INR   Recent Labs   Lab 05/30/19  1206 05/31/19  1356 05/31/19  1456   INR 1.0 1.6* 1.2    and All pertinent lab results from the last 24 hours have been reviewed.    Significant Imaging: Echocardiogram:   2D echo with color flow doppler:   Results for orders placed or performed during the hospital encounter of 05/29/19   2D echo with color flow doppler   Result Value Ref Range    QEF 60 55 - 65    Diastolic Dysfunction No     Est. PA Systolic Pressure 30.46     and X-Ray: CXR: X-Ray Chest 1 View (CXR): No results found for this visit on 05/29/19.

## 2019-05-31 NOTE — ASSESSMENT & PLAN NOTE
Continue ASA, heparin, metoprolol and Lipitor.  Echocardiogram and lipid panel pending.  Patient was evaluated for Cardiac Rehab and is not a candidate at this time.  LHC revealed multi-vessel disease.  Dr. Dorado performed 5 vessel CABG on 31 May.

## 2019-05-31 NOTE — HOSPITAL COURSE
05/31: Seen and examined at bedside in the MICU post surgery. Hospital chart reviewed. No acute interval detrimental events noted.    06/01: POD #1: Seen and examined at bedside. Hospital chart reviewed. No acute interval detrimental events noted. Successfully extubated.PAC discontinued. Mediastinal and pleural drains - minimal drainage. MID LINE placed. Stable hemodynamics. No arrhythmias. Up in chair.  He reports that he  has moderately improved.    06/02: POD # 2: Seen and examined at bedside. Hospital chart reviewed. No acute interval detrimental events noted.  Mediastinal and pleural  DISCONTIUED. MID LINE placed. Stable hemodynamics. No arrhythmias. Up in chair and eating breakfast..  He reports that he  has moderately improved.

## 2019-05-31 NOTE — ASSESSMENT & PLAN NOTE
POD # 1:     Paced: Mode AAI, Rate 90, A mA 20, V mA 25:  normal pacemaker function and stable pacing and sensing thresholds ; Multiple drains: Monitor output.     STERNAL INCISION: staples in place; wound clean, dry, and intact,     PAC: STABLE HEMODYNAMICS.    Monitor hemodynamics and  monitor for dysrhythmias MAP goal of  65 mmHg.      CARDIAC STERNAL PRECAUTIONS.       CARDIOACTIVE MEDS:   EPINEPHRINE, VASOPRESSIN, CARDENE      DIURETICS:  FUROSEMIDE

## 2019-05-31 NOTE — PROGRESS NOTES
[facility] -   Cardiology  Progress Note    Patient Name: Chapin Calderon  MRN: [id number]  Admission Date: 5/29/2019  Hospital Length of Stay: 2 days  Code Status: Full Code   Attending Physician: Mani Dorado MD   Primary Care Physician: Tiffanie Vicente MD  Expected Discharge Date: 6/7/2019  Principal Problem:NSTEMI (non-ST elevated myocardial infarction)    Subjective:   HPI    Pt c/o chest pain, mid chest, all day yesterday which prompted him to go to ER overnight.  Cp much improved now, seems mostly resolved.  ecg shows NSR, low voltage, possible septal infarct.  Troponin 6.2 on initial labs.   .  No prior h/o CAD.  Active male with no significant health issues otherwise per pt.      Hospital Course:   5/30/19--Patient seen and examined in room, lying in bed. Denies chest pain or SOB today. IV heparin gtt in place. Plans for CABG tomorrow per CT surgery. Labs reviewed, K+ 4.5, Cr 0.8, Mag 1.9.     5/31/19--Patient seen and examined in ICU, intubated and sedated post CABG. CABG x 5V per Dr. Dorado today. Labs reviewed, INR 1.2, K+ 4.2, Cr 1.0, Mag 3.8, H/H 9/25.1.     Interval History: no acute issues noted o/n. CABG x 5V per CT Surgery today. Remains intubated and sedated post surgery today in ICU>    Review of Systems   Unable to perform ROS: intubated     Objective:     Vital Signs (Most Recent):  Temp: 98.8 °F (37.1 °C) (05/31/19 0348)  Pulse: 91 (05/31/19 1600)  Resp: 18 (05/31/19 1600)  BP: (!) 100/55 (05/31/19 1600)  SpO2: 100 % (05/31/19 1600) Vital Signs (24h Range):  Temp:  [98.7 °F (37.1 °C)-98.9 °F (37.2 °C)] 98.8 °F (37.1 °C)  Pulse:  [51-91] 91  Resp:  [16-27] 18  SpO2:  [96 %-100 %] 100 %  BP: ()/(46-71) 100/55     Weight: 73.6 kg (162 lb 4.1 oz)  Body mass index is 25.41 kg/m².     SpO2: 100 %  O2 Device (Oxygen Therapy): ventilator      Intake/Output Summary (Last 24 hours) at 5/31/2019 1611  Last data filed at 5/31/2019 1415  Gross per 24 hour   Intake 3033.47 ml    Output 1225 ml   Net 1808.47 ml       Lines/Drains/Airways     Central Venous Catheter Line                 Introducer 05/31/19 1039 right internal jugular less than 1 day         Percutaneous Central Line Insertion/Assessment - double lumen  05/31/19 0735 right internal jugular less than 1 day         Pulmonary Artery Catheter Assessment  05/31/19 1041 right internal jugular less than 1 day          Drain                 Closed/Suction Drain 05/24/19 1002 Left Leg Bulb 7 days         Closed/Suction Drain 05/31/19 1103 Right Leg Bulb 19 Fr. less than 1 day         Closed/Suction Drain 05/31/19 1105 Left Leg Bulb 19 Fr. less than 1 day         Urethral Catheter 05/31/19 0725 Latex;Straight-tip;Temperature probe 16 Fr. less than 1 day         Y Chest Tube 1 and 2 05/31/19 1302 1 Right Mediastinal 24 Fr. 2 Left Mediastinal 24 Fr. less than 1 day         Y Chest Tube 3 and 4 05/31/19 1303 3 Right Pleural 24 Fr. Left Pleural 24 Fr. less than 1 day          Airway                 Airway - Non-Surgical 05/31/19 0717 Endotracheal Tube less than 1 day          Arterial Line                 Arterial Line 05/31/19 0708 Right Radial less than 1 day          Peripheral Intravenous Line                 Peripheral IV - Single Lumen 05/29/19 0820 20 G Left Forearm 2 days                Physical Exam   Constitutional: He is oriented to person, place, and time. He appears well-developed and well-nourished. No distress. He is sedated and intubated.   HENT:   Head: Normocephalic and atraumatic.   Eyes: Pupils are equal, round, and reactive to light.   Neck: Neck supple. No JVD present.   RT IJ PA Cath   Cardiovascular: Normal rate, regular rhythm, S1 normal, S2 normal and intact distal pulses. PMI is not displaced. Exam reveals no distant heart sounds.   No murmur heard.  Pulses:       Radial pulses are 2+ on the right side, and 2+ on the left side.        Dorsalis pedis pulses are 1+ on the right side, and 1+ on the left side.    Left radial access site C/D/I, no hematoma or ecchymosis noted. No drainage or signs of infection noted today.     Mid-Sternal CABG incision bloody drainage noted to dressing today.   Pacer wires secured, rate 90 bpm, AAI  2 Pleural drains, 2 Mediastinal drains in place    LLE and RLE ACE wrap in place   Pulmonary/Chest: Effort normal and breath sounds normal. No accessory muscle usage. He is intubated. No respiratory distress. He has no decreased breath sounds. He has no wheezes. He has no rales.   Abdominal: Soft. Bowel sounds are normal. He exhibits no distension. There is no tenderness.   Musculoskeletal: Normal range of motion. He exhibits no edema.        Right ankle: He exhibits no swelling.        Left ankle: He exhibits no swelling.   Neurological: He is oriented to person, place, and time.   Skin: Skin is warm and dry. He is not diaphoretic. No cyanosis. Nails show no clubbing.   Psychiatric: He has a normal mood and affect. His speech is normal and behavior is normal. Judgment and thought content normal. Cognition and memory are normal.   Nursing note and vitals reviewed.      Significant Labs:   BMP:   Recent Labs   Lab 05/30/19  0422 05/31/19  0543 05/31/19  1456   GLU 98 92 131*  131*   * 135* 138  138   K 4.5 4.2 4.2  4.2    100 106  106   CO2 29 28 22*  22*   BUN 11 13 13  13   CREATININE 0.8 0.9 1.0  1.0   CALCIUM 9.4 9.7 9.1  9.1   MG 1.9 2.1 3.8*   , CMP   Recent Labs   Lab 05/30/19  0422 05/31/19  0543 05/31/19  1456   * 135* 138  138   K 4.5 4.2 4.2  4.2    100 106  106   CO2 29 28 22*  22*   GLU 98 92 131*  131*   BUN 11 13 13  13   CREATININE 0.8 0.9 1.0  1.0   CALCIUM 9.4 9.7 9.1  9.1   PROT 6.1 6.5  --    ALBUMIN 3.5 3.6  --    BILITOT 0.6 0.7  --    ALKPHOS 73 75  --    AST 39 27  --    ALT 13 14  --    ANIONGAP 6* 7* 10  10   ESTGFRAFRICA >60 >60 >60  >60   EGFRNONAA >60 >60 >60  >60   , CBC   Recent Labs   Lab 05/31/19  0543  05/31/19  6300  05/31/19  1456 05/31/19  1505   WBC 9.28  --  19.37* 18.99*  --    HGB 14.3  --  9.4* 9.0*  --    HCT 41.2   < > 26.4* 25.1* 22*     --  94*  94* 101*  --     < > = values in this interval not displayed.   , INR   Recent Labs   Lab 05/30/19  1206 05/31/19  1356 05/31/19  1456   INR 1.0 1.6* 1.2    and All pertinent lab results from the last 24 hours have been reviewed.    Significant Imaging: Echocardiogram:   2D echo with color flow doppler:   Results for orders placed or performed during the hospital encounter of 05/29/19   2D echo with color flow doppler   Result Value Ref Range    QEF 60 55 - 65    Diastolic Dysfunction No     Est. PA Systolic Pressure 30.46     and X-Ray: CXR: X-Ray Chest 1 View (CXR): No results found for this visit on 05/29/19.    Assessment and Plan:     * NSTEMI (non-ST elevated myocardial infarction)   5/30/19  -LHC completed per Dr. Chase which revealed severe multivessel CAD and CT surgery consulted  -Plans for CABG per Dr. Dorado tomorrow in place  -Continue ASA, Statin, IV heparin gtt, BB  -Unable to add ACEi/ARB for now given soft BP, will attempt pending clinical course  -ECHO revealed EF 60-65%    5/31  -see plan for s/p CABG    S/P CABG (coronary artery bypass graft)  CABG x 5V per CT surgery  Intubated, sedated in ICU today post surgery  Mgmt per CT surgery  Continue ASA, Statin, BB  Resume ARB once OK with CT surgery  Will continue to follow along    Coronary artery disease involving native coronary artery of native heart with angina pectoris  See plan for s/p CABG    AP (angina pectoris)  See management plan detailed above.     Abnormal ECG  See management plan detailed above.         VTE Risk Mitigation (From admission, onward)        Ordered     IP VTE LOW RISK PATIENT  Once      05/30/19 0752     Place SKY hose  Until discontinued      05/30/19 0752     Place sequential compression device  Until discontinued      05/30/19 0752     heparin 25,000 units in dextrose 5%  250 mL (100 units/mL) infusion LOW INTENSITY nomogram - [facility]  Continuous      05/29/19 2001     Reason for no Mechanical VTE Prophylaxis  Once      05/29/19 1431     heparin 25,000 units in dextrose 5% (100 units/ml) IV bolus from bag - ADDITIONAL PRN BOLUS - 60 units/kg (max bolus 4000 units)  As needed (PRN)      05/29/19 0938     heparin 25,000 units in dextrose 5% (100 units/ml) IV bolus from bag - ADDITIONAL PRN BOLUS - 30 units/kg (max bolus 4000 units)  As needed (PRN)      05/29/19 0914          MANUEL Goss-LIZBETH  Cardiology  [facility] -

## 2019-05-31 NOTE — SUBJECTIVE & OBJECTIVE
Past Medical History:   Diagnosis Date    Prostate cancer        Past Surgical History:   Procedure Laterality Date    APPENDECTOMY         Review of patient's allergies indicates:   Allergen Reactions    Penicillins Rash     Patient tolerated Ancef on 05/31/19       Scheduled Meds:   albuterol-ipratropium  3 mL Nebulization Q4H    [START ON 6/1/2019] aspirin  81 mg Oral Daily    atorvastatin  80 mg Oral Daily    calcium gluconate        calcium gluconate IVPB  2,000 mg Intravenous Once    ceFAZolin (ANCEF) IVPB  2 g Intravenous Q8H    chlorhexidine  10 mL Mouth/Throat BID    [START ON 6/1/2019] clopidogrel  75 mg Oral Daily    [START ON 6/1/2019] furosemide  40 mg Intravenous BID    [START ON 6/1/2019] metoprolol tartrate  25 mg Oral BID    nozaseptin   Each Nare BID    [START ON 6/1/2019] pantoprazole  40 mg Intravenous Daily    [START ON 6/1/2019] polyethylene glycol  17 g Oral Daily    [START ON 6/1/2019] potassium chloride  20 mEq Oral Q12H     Continuous Infusions:   dexmedetomidine (PRECEDEX) infusion 1 mcg/kg/hr (05/31/19 1542)    dextrose 5% lactated ringers 25 mL/hr at 05/31/19 1535    epinephrine infusion      insulin (HUMAN R) infusion (adults)      niCARdipine      vasopressin (PITRESSIN) infusion 0.04 Units/min (05/31/19 1510)     PRN Meds:.sodium chloride, acetaminophen, albumin human 5%, ceFAZolin (ANCEF) IVPB, chlorhexidine, dextrose 50%, dextrose 50%, fentaNYL, fentaNYL, heparin (PORCINE), heparin (PORCINE), insulin (HUMAN R) infusion (adults), lactated ringers, magnesium sulfate IVPB, metoclopramide HCl, metoprolol tartrate, nozaseptin, ondansetron, ondansetron, oxyCODONE, oxyCODONE, potassium chloride in water **AND** potassium chloride in water **AND** potassium chloride in water       Family History     None        Tobacco Use    Smoking status: Former Smoker    Smokeless tobacco: Never Used   Substance and Sexual Activity    Alcohol use: No    Drug use: No    Sexual  activity: Not on file         Review of Systems   Unable to perform ROS: Intubated     Objective:     Vital Signs (Most Recent):  Temp: 98.8 °F (37.1 °C) (05/31/19 0348)  Pulse: 91 (05/31/19 1600)  Resp: 18 (05/31/19 1600)  BP: (!) 100/55 (05/31/19 1600)  SpO2: 100 % (05/31/19 1600) Vital Signs (24h Range):  Temp:  [98.7 °F (37.1 °C)-98.9 °F (37.2 °C)] 98.8 °F (37.1 °C)  Pulse:  [51-91] 91  Resp:  [16-27] 18  SpO2:  [96 %-100 %] 100 %  BP: ()/(46-71) 100/55     Weight: 73.6 kg (162 lb 4.1 oz)  Body mass index is 25.41 kg/m².      Intake/Output Summary (Last 24 hours) at 5/31/2019 1636  Last data filed at 5/31/2019 1415  Gross per 24 hour   Intake 3033.47 ml   Output 1225 ml   Net 1808.47 ml       Physical Exam   Constitutional: He is oriented to person, place, and time. He appears well-developed and well-nourished. No distress.   HENT:   Head: Normocephalic and atraumatic.   Eyes: Pupils are equal, round, and reactive to light. EOM are normal.   Neck: Normal range of motion. Neck supple. No JVD present.   Cardiovascular: Normal rate, regular rhythm and intact distal pulses. Exam reveals no gallop and no friction rub.   No murmur heard.  Pulmonary/Chest: Effort normal and breath sounds normal. No stridor. No respiratory distress. He has no wheezes. He has no rales. He exhibits no tenderness.   Abdominal: Soft. Bowel sounds are normal. He exhibits no distension. There is no tenderness. There is no guarding.   Musculoskeletal: Normal range of motion. He exhibits no edema, tenderness or deformity.   Neurological: He is alert and oriented to person, place, and time. He displays normal reflexes. No cranial nerve deficit or sensory deficit. He exhibits normal muscle tone. Coordination normal.   Skin: Skin is warm and dry. Capillary refill takes less than 2 seconds. No rash noted. He is not diaphoretic. No erythema. No pallor.   Psychiatric: He has a normal mood and affect. His behavior is normal. Judgment and thought  content normal.   Nursing note and vitals reviewed.      Vents:  Vent Mode: A/C (05/31/19 1540)  Set Rate: 20 bmp (05/31/19 1540)  Vt Set: 400 mL (05/31/19 1540)  Pressure Support: 0 cmH20 (05/31/19 1540)  PEEP/CPAP: 5 cmH20 (05/31/19 1540)  Oxygen Concentration (%): 50 (05/31/19 1600)  Peak Airway Pressure: 19 cmH2O (05/31/19 1540)  Plateau Pressure: 0 cmH20 (05/31/19 1540)  Total Ve: 9.47 mL (05/31/19 1540)  F/VT Ratio<105 (RSBI): (!) 61.09 (05/31/19 1540)    Lines/Drains/Airways     Central Venous Catheter Line                 Introducer 05/31/19 1039 right internal jugular less than 1 day         Percutaneous Central Line Insertion/Assessment - double lumen  05/31/19 0735 right internal jugular less than 1 day         Pulmonary Artery Catheter Assessment  05/31/19 1041 right internal jugular less than 1 day          Drain                 Closed/Suction Drain 05/24/19 1002 Left Leg Bulb 7 days         Closed/Suction Drain 05/31/19 1103 Right Leg Bulb 19 Fr. less than 1 day         Closed/Suction Drain 05/31/19 1105 Left Leg Bulb 19 Fr. less than 1 day         Urethral Catheter 05/31/19 0725 Latex;Straight-tip;Temperature probe 16 Fr. less than 1 day         Y Chest Tube 1 and 2 05/31/19 1302 1 Right Mediastinal 24 Fr. 2 Left Mediastinal 24 Fr. less than 1 day         Y Chest Tube 3 and 4 05/31/19 1303 3 Right Pleural 24 Fr. Left Pleural 24 Fr. less than 1 day          Airway                 Airway - Non-Surgical 05/31/19 0717 Endotracheal Tube less than 1 day          Arterial Line                 Arterial Line 05/31/19 0708 Right Radial less than 1 day          Peripheral Intravenous Line                 Peripheral IV - Single Lumen 05/29/19 0820 20 G Left Forearm 2 days                Significant Labs:    CBC/Anemia Profile:  Recent Labs   Lab 05/31/19  0543  05/31/19  1356 05/31/19  1456 05/31/19  1505   WBC 9.28  --  19.37* 18.99*  --    HGB 14.3  --  9.4* 9.0*  --    HCT 41.2   < > 26.4* 25.1* 22*      --  94*  94* 101*  --    MCV 86  --  86 85  --    RDW 12.7  --  12.8 12.7  --     < > = values in this interval not displayed.        Chemistries:  Recent Labs   Lab 05/30/19  0422 05/31/19  0543 05/31/19  1456   * 135* 138  138   K 4.5 4.2 4.2  4.2    100 106  106   CO2 29 28 22*  22*   BUN 11 13 13  13   CREATININE 0.8 0.9 1.0  1.0   CALCIUM 9.4 9.7 9.1  9.1   ALBUMIN 3.5 3.6  --    PROT 6.1 6.5  --    BILITOT 0.6 0.7  --    ALKPHOS 73 75  --    ALT 13 14  --    AST 39 27  --    MG 1.9 2.1 3.8*   PHOS 3.0 3.5  --        ABGs:   Recent Labs   Lab 05/31/19  1505   PH 7.403   PCO2 46.5*   HCO3 29.0*   POCSATURATED 100   BE 4     Coagulation:   Recent Labs   Lab 05/31/19  1456   INR 1.2   APTT 29.4     POCT Glucose:   Recent Labs   Lab 05/31/19  1505   POCTGLUCOSE 137*     Prealbumin:   Recent Labs   Lab 05/30/19  1206   PREALBUMIN 19*       Significant Imaging:       Chest x-ray: Tubes and lines are satisfactory for postoperative exam with endotracheal tube in satisfactory position.  [place]-Koib catheter noted.  Extensive catheters and tubes overlie the chest.  No gross pneumothorax.  Mild atelectasis at the lung bases.  No large pleural effusion.  Mild cardiomegaly.  Sternotomy wires are noted.

## 2019-06-01 PROBLEM — Z99.11 ON MECHANICALLY ASSISTED VENTILATION: Status: RESOLVED | Noted: 2019-05-31 | Resolved: 2019-06-01

## 2019-06-01 PROBLEM — D64.9 ANEMIA FOLLOWING SURGERY: Status: ACTIVE | Noted: 2019-06-01

## 2019-06-01 PROBLEM — I25.10 CAD, MULTIPLE VESSEL: Status: ACTIVE | Noted: 2019-06-01

## 2019-06-01 LAB
ANION GAP SERPL CALC-SCNC: 10 MMOL/L (ref 8–16)
ANION GAP SERPL CALC-SCNC: 11 MMOL/L (ref 8–16)
APTT BLDCRRT: 32.6 SEC (ref 21–32)
APTT BLDCRRT: 32.6 SEC (ref 21–32)
BASOPHILS # BLD AUTO: 0 K/UL (ref 0–0.2)
BASOPHILS # BLD AUTO: 0.01 K/UL (ref 0–0.2)
BASOPHILS # BLD AUTO: 0.01 K/UL (ref 0–0.2)
BASOPHILS NFR BLD: 0 % (ref 0–1.9)
BUN SERPL-MCNC: 17 MG/DL (ref 8–23)
BUN SERPL-MCNC: 19 MG/DL (ref 8–23)
BUN SERPL-MCNC: 20 MG/DL (ref 8–23)
BUN SERPL-MCNC: 21 MG/DL (ref 8–23)
CALCIUM SERPL-MCNC: 8.7 MG/DL (ref 8.7–10.5)
CALCIUM SERPL-MCNC: 9.4 MG/DL (ref 8.7–10.5)
CALCIUM SERPL-MCNC: 9.8 MG/DL (ref 8.7–10.5)
CALCIUM SERPL-MCNC: 9.8 MG/DL (ref 8.7–10.5)
CHLORIDE SERPL-SCNC: 100 MMOL/L (ref 95–110)
CHLORIDE SERPL-SCNC: 101 MMOL/L (ref 95–110)
CHLORIDE SERPL-SCNC: 105 MMOL/L (ref 95–110)
CHLORIDE SERPL-SCNC: 99 MMOL/L (ref 95–110)
CO2 SERPL-SCNC: 24 MMOL/L (ref 23–29)
CO2 SERPL-SCNC: 26 MMOL/L (ref 23–29)
CO2 SERPL-SCNC: 27 MMOL/L (ref 23–29)
CO2 SERPL-SCNC: 28 MMOL/L (ref 23–29)
CREAT SERPL-MCNC: 1.1 MG/DL (ref 0.5–1.4)
CREAT SERPL-MCNC: 1.2 MG/DL (ref 0.5–1.4)
DIFFERENTIAL METHOD: ABNORMAL
EOSINOPHIL # BLD AUTO: 0 K/UL (ref 0–0.5)
EOSINOPHIL NFR BLD: 0 % (ref 0–8)
ERYTHROCYTE [DISTWIDTH] IN BLOOD BY AUTOMATED COUNT: 13.1 % (ref 11.5–14.5)
ERYTHROCYTE [DISTWIDTH] IN BLOOD BY AUTOMATED COUNT: 14.3 % (ref 11.5–14.5)
ERYTHROCYTE [DISTWIDTH] IN BLOOD BY AUTOMATED COUNT: 14.5 % (ref 11.5–14.5)
EST. GFR  (AFRICAN AMERICAN): >60 ML/MIN/1.73 M^2
EST. GFR  (NON AFRICAN AMERICAN): 59 ML/MIN/1.73 M^2
EST. GFR  (NON AFRICAN AMERICAN): >60 ML/MIN/1.73 M^2
GLUCOSE SERPL-MCNC: 127 MG/DL (ref 70–110)
GLUCOSE SERPL-MCNC: 128 MG/DL (ref 70–110)
GLUCOSE SERPL-MCNC: 143 MG/DL (ref 70–110)
GLUCOSE SERPL-MCNC: 160 MG/DL (ref 70–110)
HCT VFR BLD AUTO: 20 % (ref 40–54)
HCT VFR BLD AUTO: 29.2 % (ref 40–54)
HCT VFR BLD AUTO: 29.4 % (ref 40–54)
HGB BLD-MCNC: 10.3 G/DL (ref 14–18)
HGB BLD-MCNC: 10.4 G/DL (ref 14–18)
HGB BLD-MCNC: 7.3 G/DL (ref 14–18)
INR PPP: 1.1 (ref 0.8–1.2)
LYMPHOCYTES # BLD AUTO: 0.7 K/UL (ref 1–4.8)
LYMPHOCYTES # BLD AUTO: 1.3 K/UL (ref 1–4.8)
LYMPHOCYTES # BLD AUTO: 1.4 K/UL (ref 1–4.8)
LYMPHOCYTES NFR BLD: 4.7 % (ref 18–48)
LYMPHOCYTES NFR BLD: 6 % (ref 18–48)
LYMPHOCYTES NFR BLD: 6.3 % (ref 18–48)
MAGNESIUM SERPL-MCNC: 2.3 MG/DL (ref 1.6–2.6)
MAGNESIUM SERPL-MCNC: 2.6 MG/DL (ref 1.6–2.6)
MAGNESIUM SERPL-MCNC: 2.6 MG/DL (ref 1.6–2.6)
MCH RBC QN AUTO: 29.9 PG (ref 27–31)
MCH RBC QN AUTO: 29.9 PG (ref 27–31)
MCH RBC QN AUTO: 30.9 PG (ref 27–31)
MCHC RBC AUTO-ENTMCNC: 35.3 G/DL (ref 32–36)
MCHC RBC AUTO-ENTMCNC: 35.4 G/DL (ref 32–36)
MCHC RBC AUTO-ENTMCNC: 36.5 G/DL (ref 32–36)
MCV RBC AUTO: 85 FL (ref 82–98)
MONOCYTES # BLD AUTO: 1.3 K/UL (ref 0.3–1)
MONOCYTES # BLD AUTO: 2 K/UL (ref 0.3–1)
MONOCYTES # BLD AUTO: 2.9 K/UL (ref 0.3–1)
MONOCYTES NFR BLD: 13.3 % (ref 4–15)
MONOCYTES NFR BLD: 9.2 % (ref 4–15)
MONOCYTES NFR BLD: 9.4 % (ref 4–15)
NEUTROPHILS # BLD AUTO: 12.5 K/UL (ref 1.8–7.7)
NEUTROPHILS # BLD AUTO: 17.2 K/UL (ref 1.8–7.7)
NEUTROPHILS # BLD AUTO: 18.4 K/UL (ref 1.8–7.7)
NEUTROPHILS NFR BLD: 80.7 % (ref 38–73)
NEUTROPHILS NFR BLD: 85 % (ref 38–73)
NEUTROPHILS NFR BLD: 86.4 % (ref 38–73)
PHOSPHATE SERPL-MCNC: 2.9 MG/DL (ref 2.7–4.5)
PLATELET # BLD AUTO: 112 K/UL (ref 150–350)
PLATELET # BLD AUTO: 113 K/UL (ref 150–350)
PLATELET # BLD AUTO: 124 K/UL (ref 150–350)
PMV BLD AUTO: 10.2 FL (ref 9.2–12.9)
PMV BLD AUTO: 9.8 FL (ref 9.2–12.9)
PMV BLD AUTO: 9.8 FL (ref 9.2–12.9)
POCT GLUCOSE: 117 MG/DL (ref 70–110)
POCT GLUCOSE: 123 MG/DL (ref 70–110)
POCT GLUCOSE: 128 MG/DL (ref 70–110)
POCT GLUCOSE: 142 MG/DL (ref 70–110)
POCT GLUCOSE: 146 MG/DL (ref 70–110)
POCT GLUCOSE: 150 MG/DL (ref 70–110)
POCT GLUCOSE: 162 MG/DL (ref 70–110)
POCT GLUCOSE: 203 MG/DL (ref 70–110)
POCT GLUCOSE: 216 MG/DL (ref 70–110)
POCT GLUCOSE: 236 MG/DL (ref 70–110)
POCT GLUCOSE: 250 MG/DL (ref 70–110)
POTASSIUM SERPL-SCNC: 3.8 MMOL/L (ref 3.5–5.1)
POTASSIUM SERPL-SCNC: 4.4 MMOL/L (ref 3.5–5.1)
POTASSIUM SERPL-SCNC: 4.4 MMOL/L (ref 3.5–5.1)
POTASSIUM SERPL-SCNC: 5 MMOL/L (ref 3.5–5.1)
PROTHROMBIN TIME: 11.7 SEC (ref 9–12.5)
RBC # BLD AUTO: 2.36 M/UL (ref 4.6–6.2)
RBC # BLD AUTO: 3.45 M/UL (ref 4.6–6.2)
RBC # BLD AUTO: 3.48 M/UL (ref 4.6–6.2)
SODIUM SERPL-SCNC: 136 MMOL/L (ref 136–145)
SODIUM SERPL-SCNC: 138 MMOL/L (ref 136–145)
SODIUM SERPL-SCNC: 138 MMOL/L (ref 136–145)
SODIUM SERPL-SCNC: 139 MMOL/L (ref 136–145)
WBC # BLD AUTO: 14.56 K/UL (ref 3.9–12.7)
WBC # BLD AUTO: 21.37 K/UL (ref 3.9–12.7)
WBC # BLD AUTO: 21.73 K/UL (ref 3.9–12.7)

## 2019-06-01 PROCEDURE — P9016 RBC LEUKOCYTES REDUCED: HCPCS

## 2019-06-01 PROCEDURE — 85730 THROMBOPLASTIN TIME PARTIAL: CPT

## 2019-06-01 PROCEDURE — C9113 INJ PANTOPRAZOLE SODIUM, VIA: HCPCS | Performed by: NURSE PRACTITIONER

## 2019-06-01 PROCEDURE — C1751 CATH, INF, PER/CENT/MIDLINE: HCPCS

## 2019-06-01 PROCEDURE — 27200667 HC PACEMAKER, TEMPORARY MONITORING, PER SHIFT

## 2019-06-01 PROCEDURE — 83735 ASSAY OF MAGNESIUM: CPT

## 2019-06-01 PROCEDURE — 94640 AIRWAY INHALATION TREATMENT: CPT

## 2019-06-01 PROCEDURE — 85610 PROTHROMBIN TIME: CPT

## 2019-06-01 PROCEDURE — 27000221 HC OXYGEN, UP TO 24 HOURS

## 2019-06-01 PROCEDURE — 99291 PR CRITICAL CARE, E/M 30-74 MINUTES: ICD-10-PCS | Mod: ,,, | Performed by: INTERNAL MEDICINE

## 2019-06-01 PROCEDURE — 80048 BASIC METABOLIC PNL TOTAL CA: CPT | Mod: 91

## 2019-06-01 PROCEDURE — 99233 PR SUBSEQUENT HOSPITAL CARE,LEVL III: ICD-10-PCS | Mod: ,,, | Performed by: INTERNAL MEDICINE

## 2019-06-01 PROCEDURE — 99900035 HC TECH TIME PER 15 MIN (STAT)

## 2019-06-01 PROCEDURE — 84100 ASSAY OF PHOSPHORUS: CPT

## 2019-06-01 PROCEDURE — 71000028 HC RECOVERY HIGH ACUITY/ PER HOUR

## 2019-06-01 PROCEDURE — 25000003 PHARM REV CODE 250: Performed by: NURSE PRACTITIONER

## 2019-06-01 PROCEDURE — 97166 OT EVAL MOD COMPLEX 45 MIN: CPT

## 2019-06-01 PROCEDURE — 25000242 PHARM REV CODE 250 ALT 637 W/ HCPCS: Performed by: NURSE PRACTITIONER

## 2019-06-01 PROCEDURE — 83735 ASSAY OF MAGNESIUM: CPT | Mod: 91

## 2019-06-01 PROCEDURE — 36410 VNPNXR 3YR/> PHY/QHP DX/THER: CPT

## 2019-06-01 PROCEDURE — 80048 BASIC METABOLIC PNL TOTAL CA: CPT

## 2019-06-01 PROCEDURE — 99291 CRITICAL CARE FIRST HOUR: CPT | Mod: ,,, | Performed by: INTERNAL MEDICINE

## 2019-06-01 PROCEDURE — 94799 UNLISTED PULMONARY SVC/PX: CPT

## 2019-06-01 PROCEDURE — 85025 COMPLETE CBC W/AUTO DIFF WBC: CPT | Mod: 91

## 2019-06-01 PROCEDURE — 63600175 PHARM REV CODE 636 W HCPCS: Performed by: THORACIC SURGERY (CARDIOTHORACIC VASCULAR SURGERY)

## 2019-06-01 PROCEDURE — 25000003 PHARM REV CODE 250: Performed by: INTERNAL MEDICINE

## 2019-06-01 PROCEDURE — 99233 SBSQ HOSP IP/OBS HIGH 50: CPT | Mod: ,,, | Performed by: INTERNAL MEDICINE

## 2019-06-01 PROCEDURE — 36430 TRANSFUSION BLD/BLD COMPNT: CPT

## 2019-06-01 PROCEDURE — 20000000 HC ICU ROOM

## 2019-06-01 PROCEDURE — 63600175 PHARM REV CODE 636 W HCPCS: Performed by: NURSE PRACTITIONER

## 2019-06-01 PROCEDURE — 97530 THERAPEUTIC ACTIVITIES: CPT

## 2019-06-01 RX ORDER — INSULIN ASPART 100 [IU]/ML
1-10 INJECTION, SOLUTION INTRAVENOUS; SUBCUTANEOUS
Status: DISCONTINUED | OUTPATIENT
Start: 2019-06-01 | End: 2019-06-05

## 2019-06-01 RX ORDER — GLUCAGON 1 MG
1 KIT INJECTION
Status: DISCONTINUED | OUTPATIENT
Start: 2019-06-01 | End: 2019-06-05

## 2019-06-01 RX ORDER — IBUPROFEN 200 MG
24 TABLET ORAL
Status: DISCONTINUED | OUTPATIENT
Start: 2019-06-01 | End: 2019-06-05

## 2019-06-01 RX ORDER — IBUPROFEN 200 MG
16 TABLET ORAL
Status: DISCONTINUED | OUTPATIENT
Start: 2019-06-01 | End: 2019-06-05

## 2019-06-01 RX ORDER — HYDROCODONE BITARTRATE AND ACETAMINOPHEN 500; 5 MG/1; MG/1
TABLET ORAL
Status: DISCONTINUED | OUTPATIENT
Start: 2019-06-01 | End: 2019-06-05 | Stop reason: HOSPADM

## 2019-06-01 RX ADMIN — POTASSIUM CHLORIDE 20 MEQ: 1500 TABLET, EXTENDED RELEASE ORAL at 09:06

## 2019-06-01 RX ADMIN — POTASSIUM CHLORIDE 20 MEQ: 1500 TABLET, EXTENDED RELEASE ORAL at 08:06

## 2019-06-01 RX ADMIN — CHLORHEXIDINE GLUCONATE 0.12% ORAL RINSE 10 ML: 1.2 LIQUID ORAL at 08:06

## 2019-06-01 RX ADMIN — POTASSIUM CHLORIDE 20 MEQ: 200 INJECTION, SOLUTION INTRAVENOUS at 05:06

## 2019-06-01 RX ADMIN — ASPIRIN 81 MG: 81 TABLET, COATED ORAL at 09:06

## 2019-06-01 RX ADMIN — OXYCODONE HYDROCHLORIDE 10 MG: 5 TABLET ORAL at 01:06

## 2019-06-01 RX ADMIN — OXYCODONE HYDROCHLORIDE 5 MG: 5 TABLET ORAL at 10:06

## 2019-06-01 RX ADMIN — CLOPIDOGREL BISULFATE 75 MG: 75 TABLET ORAL at 09:06

## 2019-06-01 RX ADMIN — IPRATROPIUM BROMIDE AND ALBUTEROL SULFATE 3 ML: .5; 3 SOLUTION RESPIRATORY (INHALATION) at 11:06

## 2019-06-01 RX ADMIN — CALCIUM GLUCONATE 3000 MG: 94 INJECTION, SOLUTION INTRAVENOUS at 10:06

## 2019-06-01 RX ADMIN — CEFAZOLIN SODIUM 2 G: 2 SOLUTION INTRAVENOUS at 05:06

## 2019-06-01 RX ADMIN — FUROSEMIDE 40 MG: 10 INJECTION, SOLUTION INTRAMUSCULAR; INTRAVENOUS at 08:06

## 2019-06-01 RX ADMIN — METOPROLOL TARTRATE 25 MG: 25 TABLET ORAL at 08:06

## 2019-06-01 RX ADMIN — POLYETHYLENE GLYCOL 3350 17 G: 17 POWDER, FOR SOLUTION ORAL at 09:06

## 2019-06-01 RX ADMIN — METOPROLOL TARTRATE 25 MG: 25 TABLET ORAL at 09:06

## 2019-06-01 RX ADMIN — FUROSEMIDE 40 MG: 10 INJECTION, SOLUTION INTRAMUSCULAR; INTRAVENOUS at 09:06

## 2019-06-01 RX ADMIN — IPRATROPIUM BROMIDE AND ALBUTEROL SULFATE 3 ML: .5; 3 SOLUTION RESPIRATORY (INHALATION) at 07:06

## 2019-06-01 RX ADMIN — IPRATROPIUM BROMIDE AND ALBUTEROL SULFATE 3 ML: .5; 3 SOLUTION RESPIRATORY (INHALATION) at 04:06

## 2019-06-01 RX ADMIN — ATORVASTATIN CALCIUM 80 MG: 40 TABLET, FILM COATED ORAL at 09:06

## 2019-06-01 RX ADMIN — PANTOPRAZOLE SODIUM 40 MG: 40 INJECTION, POWDER, FOR SOLUTION INTRAVENOUS at 09:06

## 2019-06-01 RX ADMIN — OXYCODONE HYDROCHLORIDE 10 MG: 5 TABLET ORAL at 05:06

## 2019-06-01 NOTE — PROGRESS NOTES
[facility] -   Critical Care Medicine  Progress Note    Patient Name: Chapin Calderon  MRN: [id number]  Admission Date: 5/29/2019  Hospital Length of Stay: 3 days  Code Status: Full Code  Attending Provider: Mani Dorado MD  Primary Care Provider: Tiffanie Vicente MD   Principal Problem: NSTEMI (non-ST elevated myocardial infarction)    Subjective:     HPI:  73 yrar old young who  has a past medical history of Prostate cancer and no prior history of CAD admitted to NSTEMI. LHC revealed  severe multivessel coronary artery disease including a critical proximal LAD stenosis. Underwent CABG X 5 today . Has been admitted to the MICU for after care.     Hospital/ICU Course:  05/31: Seen and examined at bedside in the MICU post surgery. Hospital chart reviewed. No acute interval detrimental events noted.    06/01: POD #1: Seen and examined at bedside. Hospital chart reviewed. No acute interval detrimental events noted. Successfully extubated.PAC discontinued. Mediastinal and pleural drains - minimal drainage. MID LINE placed. Stable hemodynamics. No arrhythmias. Up in chair.  He reports that he  has moderately improved    Interval History: Seen and examined at bedside. Hospital chart reviewed. No acute interval detrimental events noted. Successfully extubated.PAC discontinued. Mediastinal and pleural drains - minimal drainage. MID LINE placed. Stable hemodynamics. No arrhythmias. Up in chair.  He reports that he  has moderately improved      Review of Systems   Constitutional: Negative.  Negative for chills and fever.   HENT: Negative.  Negative for congestion and sore throat.    Eyes: Negative.  Negative for visual disturbance.   Respiratory: Negative.  Negative for cough, shortness of breath and wheezing.    Cardiovascular: Negative for chest pain.   Gastrointestinal: Negative for abdominal pain, diarrhea, nausea and vomiting.   Endocrine: Negative.    Genitourinary: Negative.    Musculoskeletal: Negative.   Negative for myalgias and neck stiffness.   Skin: Negative.  Negative for color change and pallor.   Allergic/Immunologic: Negative.    Neurological: Negative.    Hematological: Negative.    Psychiatric/Behavioral: Negative.    All other systems reviewed and are negative.    Scheduled Meds:   aspirin  81 mg Oral Daily    atorvastatin  80 mg Oral Daily    chlorhexidine  10 mL Mouth/Throat BID    clopidogrel  75 mg Oral Daily    furosemide  40 mg Intravenous BID    metoprolol tartrate  25 mg Oral BID    nozaseptin   Each Nare BID    pantoprazole  40 mg Intravenous Daily    polyethylene glycol  17 g Oral Daily    potassium chloride  20 mEq Oral Q12H     Continuous Infusions:   dexmedetomidine (PRECEDEX) infusion Stopped (05/31/19 2100)    dextrose 5% lactated ringers 25 mL/hr at 06/01/19 0700    epinephrine infusion 0.02 mcg/kg/min (06/01/19 0700)    niCARdipine      vasopressin (PITRESSIN) infusion Stopped (06/01/19 0200)     PRN Meds:.sodium chloride, sodium chloride, acetaminophen, albumin human 5%, ceFAZolin (ANCEF) IVPB, chlorhexidine, dextrose 50%, dextrose 50%, glucagon (human recombinant), glucose, glucose, insulin aspart U-100, lactated ringers, magnesium sulfate IVPB, metoclopramide HCl, metoprolol tartrate, nozaseptin, ondansetron, ondansetron, oxyCODONE, oxyCODONE, potassium chloride in water **AND** potassium chloride in water **AND** potassium chloride in water    Objective:     Vital Signs (Most Recent):  Temp: 100.1 °F (37.8 °C) (06/01/19 1200)  Pulse: 91 (06/01/19 1200)  Resp: (!) 35 (06/01/19 1200)  BP: 136/63 (06/01/19 1200)  SpO2: 95 % (06/01/19 1200) Vital Signs (24h Range):  Temp:  [99.9 °F (37.7 °C)-100.8 °F (38.2 °C)] 100.1 °F (37.8 °C)  Pulse:  [] 91  Resp:  [16-40] 35  SpO2:  [91 %-100 %] 95 %  BP: ()/(39-66) 136/63     Weight: 80.2 kg (176 lb 12.9 oz)  Body mass index is 27.69 kg/m².      Intake/Output Summary (Last 24 hours) at 6/1/2019 1213  Last data filed at  6/1/2019 1145  Gross per 24 hour   Intake 4415.8 ml   Output 2221 ml   Net 2194.8 ml       Physical Exam   Constitutional: He is oriented to person, place, and time. He appears well-developed and well-nourished. No distress.   HENT:   Head: Normocephalic and atraumatic.   Eyes: Pupils are equal, round, and reactive to light. EOM are normal.   Neck: Normal range of motion. Neck supple. No JVD present.   Cardiovascular: Normal rate, regular rhythm, normal heart sounds and intact distal pulses. Exam reveals no gallop and no friction rub.   No murmur heard.  Paced AAI. Hemodynamically stable. Mediastinal chest tubes in place x 2.    Pulmonary/Chest: Effort normal. No stridor. No respiratory distress. He has no wheezes. He has no rales. He exhibits tenderness.    TOD pleural chest tubes in place.     Abdominal: Soft. Bowel sounds are normal. He exhibits no distension. There is no tenderness. There is no guarding.   Musculoskeletal: Normal range of motion. He exhibits edema. He exhibits no tenderness or deformity.   1+ pitting edema to TOD LE, s/p EVH to TOD LE.    Neurological: He is alert and oriented to person, place, and time. He displays normal reflexes. No cranial nerve deficit or sensory deficit. He exhibits normal muscle tone. Coordination normal.   Skin: Skin is warm and dry. Capillary refill takes less than 2 seconds. No rash noted. He is not diaphoretic. No erythema. No pallor.   Psychiatric: He has a normal mood and affect. His behavior is normal. Judgment and thought content normal.   Nursing note and vitals reviewed.      Vents:  Vent Mode: Spont (05/31/19 2050)  Set Rate: 0 bmp (05/31/19 2050)  Vt Set: 0 mL (05/31/19 2050)  Pressure Support: 10 cmH20 (05/31/19 2050)  PEEP/CPAP: 5 cmH20 (05/31/19 2050)  Oxygen Concentration (%): 40 (05/31/19 2103)  Peak Airway Pressure: 16 cmH2O (05/31/19 2050)  Plateau Pressure: 0 cmH20 (05/31/19 2050)  Total Ve: 11.5 mL (05/31/19 2050)  Negative Inspiratory Force (cm H2O):  -32 (05/31/19 2104)  F/VT Ratio<105 (RSBI): (!) 53.76 (05/31/19 2050)    Lines/Drains/Airways     Central Venous Catheter Line                 Introducer 05/31/19 1039 right internal jugular 1 day         Percutaneous Central Line Insertion/Assessment - double lumen  05/31/19 0735 right internal jugular 1 day         Pulmonary Artery Catheter Assessment  05/31/19 1041 right internal jugular 1 day          Drain                 Closed/Suction Drain 05/31/19 1103 Right Leg Bulb 19 Fr. 1 day         Closed/Suction Drain 05/31/19 1105 Left Leg Bulb 19 Fr. 1 day         Urethral Catheter 05/31/19 0725 Latex;Straight-tip;Temperature probe 16 Fr. 1 day         Y Chest Tube 1 and 2 05/31/19 1302 1 Right Mediastinal 24 Fr. 2 Left Mediastinal 24 Fr. less than 1 day         Y Chest Tube 3 and 4 05/31/19 1303 3 Right Pleural 24 Fr. Left Pleural 24 Fr. less than 1 day          Arterial Line                 Arterial Line 05/31/19 0708 Right Radial 1 day          Line                 Pacer Wires 05/31/19 1530 less than 1 day          Peripheral Intravenous Line                 Peripheral IV - Single Lumen 05/29/19 0820 20 G Left Forearm 3 days         Midline Catheter Insertion/Assessment  - Single Lumen 06/01/19 0920 Left brachial vein 18g x 10cm less than 1 day                Significant Labs:    CBC/Anemia Profile:  Recent Labs   Lab 05/31/19  1456 05/31/19  1505 05/31/19 2117 06/01/19  0345   WBC 18.99*  --  17.74* 14.56*   HGB 9.0*  --  8.0* 7.3*   HCT 25.1* 22* 23.0* 20.0*   *  --  147* 124*   MCV 85  --  85 85   RDW 12.7  --  13.0 13.1        Chemistries:  Recent Labs   Lab 05/31/19  0543 05/31/19  1456 05/31/19 2117 06/01/19  0345   * 138  138 139 139   K 4.2 4.2  4.2 4.5 3.8    106  106 104 105   CO2 28 22*  22* 19* 24   BUN 13 13  13 15 17   CREATININE 0.9 1.0  1.0 1.2 1.1   CALCIUM 9.7 9.1  9.1 8.9 8.7   ALBUMIN 3.6  --   --   --    PROT 6.5  --   --   --    BILITOT 0.7  --   --   --     ALKPHOS 75  --   --   --    ALT 14  --   --   --    AST 27  --   --   --    MG 2.1 3.8* 3.0* 2.6   PHOS 3.5  --   --  2.9       ABGs:   Recent Labs   Lab 05/31/19  1505   PH 7.403   PCO2 46.5*   HCO3 29.0*   POCSATURATED 100   BE 4     Coagulation:   Recent Labs   Lab 06/01/19  0345   INR 1.1   APTT 32.6*  32.6*     POCT Glucose:   Recent Labs   Lab 06/01/19  0504 06/01/19  0609 06/01/19  0705   POCTGLUCOSE 150* 123* 117*       Significant Imaging:      Chest x-ray: Postoperative changes again identified.  Cardiac silhouette mildly enlarged.  Median sternotomy wires again noted.  Swans Kobi catheter remains in place.  Mediastinal drains in place.  Left-sided chest tube is unchanged.  Additional right-sided chest tube also unchanged.  No pneumothorax detected.  Mild atelectasis on the left.  No significant pulmonary vascular congestion or heart failure.  Interval removal endotracheal tube.              ABG  Recent Labs   Lab 05/31/19  1505   PH 7.403   PO2 364*   PCO2 46.5*   HCO3 29.0*   BE 4     Assessment/Plan:       I have reviewed all labs and imaging studies and compared to previous results. I have also discussed labs with all the teams in the medical care of the patient and my plan is outlined below     Neuro  Neurochecks per routine.    Cardiac/Vascular  * NSTEMI (non-ST elevated myocardial infarction)  LHC: Severe multivessel CAD, Normal LVEF  ECHO revealed EF 60-65%   S/P Revascularization:     S/P CABG x 5  POD # 1:     Paced: Mode AAI, Rate 90, A mA 20, V mA 25:  normal pacemaker function and stable pacing and sensing thresholds ; Multiple drains: Monitor output.     STERNAL INCISION: staples in place; wound clean, dry, and intact,     STABLE HEMODYNAMICS.    Monitor hemodynamics and  monitor for dysrhythmias MAP goal of  65 mmHg.      CARDIAC STERNAL PRECAUTIONS.       CARDIOACTIVE MEDS:   EPINEPHRINE,CARDENE      DIURETICS:  FUROSEMIDE      Coronary artery disease involving native coronary artery of  native heart with angina pectoris      [x]        Exercise-based cardiac rehabilitation   [x]        Weight management is recommended by the AHA for the secondary prevention  []        Smoking cessation counseling.  [x]        Treatment of High Blood Pressure   (goal of < 140/90 mm Hg // < 130/80 mm Hg in patients with DM or CKD)  [x]        Beta-blocker therapy.  [x]        Aspirin therapy (81 to 162 mg daily)   [x]        Statin therapy.   [x]        ACE inhibitor therapy.  [x]        Antiplatelet Agent therapy.  []        Influenza Vaccination.      Renal/  Monitor BUN / Cr. Mont[place] urine output. Monitor and replete electrolytes per protocol.    ID  Monitor fever curve. panculture for temperatures greater than 101 degrees F. Source control: n/a      Hematology  Monitor hemogram. Transfuse as needed.    Oncology  Leukocytosis  Likely stress margination: Monitor and trend.     Endocrine  Insulin IV gtt per CTS Adult Post Surgery Protocol. Stable EUGLYCEMIA . Blood glucose target 100 - 180 mg/dl        GI  Stress ulcer precautions. GI regimen:  PRN anti emetics, anti diarrheals and stool softeners. Diet Adult Regular (IDDSI Level 7) [facility]; Cardiac (Low Na/Chol) when able ( post extubation)         Critical Care Daily Checklist:    A: Awake: RASS Goal/Actual Goal: RASS Goal: 0-->alert and calm  Actual:     B: Spontaneous Breathing Trial Performed? Spon. Breathing Trial Initiated?: Initiated (05/31/19 2104)   C: SAT & SBT Coordinated?  N/A                    D: Delirium: CAM-ICU Overall CAM-ICU: Negative   E: Early Mobility Performed? Yes   F: Feeding Goal: Goals: Meet >85% EEN/EPN while admitted  Status: Nutrition Goal Status: new   Current Diet Order   Procedures    Diet Adult Regular (IDDSI Level 7) [facility]; Cardiac (Low Na/Chol)     Order Specific Question:   Indicate patient location for additional diet options:     Answer:   [facility]     Order Specific Question:   Additional Diet  Options:     Answer:   Cardiac (Low Na/Chol)      AS: Analgesia/Sedation OXYCODONE, FENTANYL   T: Thromboembolic Prophylaxis HEPARIN   H: HOB > 300 Yes   U: Stress Ulcer Prophylaxis (if needed) FAMOTIDINE   G: Glucose Control INSULIN IV GTT   B: Bowel Function Stool Occurrence: 0   I: Indwelling Catheter (Lines & Higgins) Necessity YES   D: De-escalation of Antimicrobials/Pharmacotherapies YES    Plan for the day/ETD Continue current    Code Status:  Family/Goals of Care: Full Code  Pending Course     Critical Care Time: 60 minutes  Critical secondary to Patient has a condition that poses threat to life and bodily function:NSTEMI s/p CABG X 5.      Critical care was time spent personally by me on the following activities: development of treatment plan with patient or surrogate and bedside caregivers, discussions with consultants, evaluation of patient's response to treatment, examination of patient, ordering and performing treatments and interventions, ordering and review of laboratory studies, ordering and review of radiographic studies, pulse oximetry, re-evaluation of patient's condition. This critical care time did not overlap with that of any other provider or involve time for any procedures.     Dann Mcclure MD  Critical Care Medicine  [facility] -

## 2019-06-01 NOTE — PT/OT/SLP PROGRESS
Occupational Therapy      Patient Name:  Chapin Calderon   MRN:  [id number]    OT EVAL INITIATED THIS PM. PT TRANSFERRED TO ICU . WILL COMPLETE EVAL AT LATER DATE.  Gillian Estrada, OT  5/31/2019   1903

## 2019-06-01 NOTE — PT/OT/SLP EVAL
Occupational Therapy   Evaluation    Name: Chapin Calderon  MRN: [id number]  Admitting Diagnosis:  NSTEMI (non-ST elevated myocardial infarction) 1 Day Post-Op    Recommendations:     Discharge Recommendations: home health OT  Discharge Equipment Recommendations:  (TBA)  Barriers to discharge:  None    Assessment:     Chapin Calderon is a 73 y.o. male with a medical diagnosis of NSTEMI (non-ST elevated myocardial infarction).  He presents with DEBILITY, IMPAIRED ADLS, FUNCTIONAL MOBILITY AND SAFETY AWARENESS. Performance deficits affecting function: weakness, impaired endurance, gait instability, impaired functional mobilty, impaired self care skills, impaired balance, decreased safety awareness, pain, decreased upper extremity function, orthopedic precautions.      Rehab Prognosis: Good; patient would benefit from acute skilled OT services to address these deficits and reach maximum level of function.       Plan:     Patient to be seen 3 x/week to address the above listed problems via self-care/home management, therapeutic activities, therapeutic exercises  · Plan of Care Expires: 06/08/19  · Plan of Care Reviewed with: patient, spouse    Subjective     Chief Complaint: PAIN  Patient/Family Comments/goals: RETURN TO PLOF (INDEPENDENT)    Occupational Profile:  Living Environment: LIVES WITH SPOUSE IN 2 STORY HOME, BUT BEDROOM IS ON FIRST FLOOR, NO STEPS TO ENTER HOME  Previous level of function: INDEPENDENT, ACTIVE, DRIVES, AMBULATES WITHOUT ASSISTANCE  Roles and Routines: DOES YARD WORK, DRIVES, ACTIVE, GROCERY SHOPS  Equipment Used at Home:  none  Assistance upon Discharge: SPOUSE    Pain/Comfort:  · Pain Rating 1: 3/10  · Location - Side 1: Bilateral  · Location - Orientation 1: upper  · Location 1: sternal  · Pain Addressed 1: Nurse notified, Cessation of Activity  · Pain Rating Post-Intervention 1: 3/10    Patients cultural, spiritual, Rastafarian conflicts given the current situation:      Objective:      Communicated with: NURSE RIVERO prior to session.  Patient found up in chair with blood pressure cuff, chest tube, cordero catheter, oxygen, peripheral IV, telemetry, pulse ox (continuous) upon OT entry to room.    General Precautions: Standard, fall, sternal   Orthopedic Precautions:N/A   Braces: N/A     Occupational Performance:    Bed Mobility:    · Patient completed Rolling/Turning to Right with moderate assistance  · Patient completed Scooting/Bridging with moderate assistance  · Patient completed Supine to Sit with moderate assistance    Functional Mobility/Transfers:  · Patient completed Sit <> Stand Transfer with moderate assistance  with  hand-held assist   · Patient completed Bed <> Chair Transfer using Stand Pivot technique with moderate assistance with hand-held assist  · Functional Mobility: TOOK ~3 STEPS TO TRANSFER TO BEDSIDE CHAIR DUE TO EXCESSIVE LINES/CHEST TUBES    Activities of Daily Living:  · Lower Body Dressing: total assistance SOCKS    Cognitive/Visual Perceptual:  Cognitive/Psychosocial Skills:     -       Oriented to: Person, Place, Time and Situation   -       Follows Commands/attention:Follows multistep  commands  -       Memory: No Deficits noted  -       Safety awareness/insight to disability: impaired     Physical Exam:  Balance:    -       FAIR  Upper Extremity Range of Motion:     -       Right Upper Extremity: WFL  -       Left Upper Extremity: WFL  Upper Extremity Strength:    -       Right Upper Extremity: NT  -       Left Upper Extremity: NT    AMPAC 6 Click ADL:  AMPAC Total Score: 14    Treatment & Education:  PT PARTICIPATED IN INITIAL OT EVALUATION TO ASSESS CURRENT LEVEL OF FUNCTION. PLEASE SEE ABOVE FOR FINDINGS. PT WILL BENEFIT FROM SKILLED OT SERVICES TO INCREASE FUNCTIONAL INDEPENDENCE AND SAFETY AWARENESS PRIOR TO RETURN TO HOME.  Education:    Patient left up in chair with all lines intact, call button in reach, NURSING notified and SPOUSE present    GOALS:    Multidisciplinary Problems     Occupational Therapy Goals        Problem: Occupational Therapy Goal    Goal Priority Disciplines Outcome Interventions   Occupational Therapy Goal     OT, PT/OT     Description:  Goals to be met by: 6/8/19     Patient will increase functional independence with ADLs by performing:    UE Dressing with Minimal Assistance.  LE Dressing with Moderate Assistance.  Grooming while standing at sink with Contact Guard Assistance.  Toileting from toilet with Moderate Assistance for hygiene and clothing management.   Toilet transfer to toilet with Minimal Assistance.                      History:     Past Medical History:   Diagnosis Date    Prostate cancer        Past Surgical History:   Procedure Laterality Date    APPENDECTOMY         Time Tracking:     OT Date of Treatment: 06/01/19  OT Start Time: 0815  OT Stop Time: 0830  OT Total Time (min): 15 min    Billable Minutes:Evaluation 15    Maegan Guaman OT  6/1/2019

## 2019-06-01 NOTE — ASSESSMENT & PLAN NOTE
06/01/2019  The patient is post-operative day 1, status-post coronary artery bypass grafting x 5.  Overall the patient is doing well.    Neuro:  The patient is awake alert and oriented x3.  Neuro exam is nonfocal.  Pain is well controlled. D/C fentanyl.   Cardiac:  Patient is off all gtts.  Excellent cardiac index.  Patient will be started on metoprolol.   Respiratory: Good sats on nasal cannula.  Continue respiratory toilet, continue incentive spirometer.  GI:  Advance diet as tolerated. Cardiac diet.    Renal:  Good urine output. Cr 1.1. K 3.8. K replaced. Mag 2.6.  Heme:  Hct 20.  Plt 124. Administer 2 units PRBC. Continue Aspirin and Plavix.   Id:  White count is 14. Tmax 100.8  Suspect secondary to stress of surgery. Will continue to follow.  Endocrine:  Glucose is controlled with insulin gtt.  Discontinue insulin gtt. Start sliding scale insulin.   Activities:  Patient is out of bed to chair.  Advance activities as tolerated.  Lines tubes and drains:  Discontinue [place] and [place].  Discontinue A-line. Discontinue cordero catheter. Discontinue AV x2. Continue chest tubes x 4 for now.  Continue pacer wires. Start midline.

## 2019-06-01 NOTE — ASSESSMENT & PLAN NOTE
Stress ulcer precautions. GI regimen:  PRN anti emetics, anti diarrheals and stool softeners. Diet Adult Regular (IDDSI Level 7) [facility]; Cardiac (Low Na/Chol) when able ( post extubation)

## 2019-06-01 NOTE — SUBJECTIVE & OBJECTIVE
Interval History: Seen and examined at bedside. Hospital chart reviewed. No acute interval detrimental events noted. Successfully extubated.PAC discontinued. Mediastinal and pleural drains - minimal drainage. MID LINE placed. Stable hemodynamics. No arrhythmias. Up in chair.  He reports that he  has moderately improved      Review of Systems   Constitutional: Negative.  Negative for chills and fever.   HENT: Negative.  Negative for congestion and sore throat.    Eyes: Negative.  Negative for visual disturbance.   Respiratory: Negative.  Negative for cough, shortness of breath and wheezing.    Cardiovascular: Negative for chest pain.   Gastrointestinal: Negative for abdominal pain, diarrhea, nausea and vomiting.   Endocrine: Negative.    Genitourinary: Negative.    Musculoskeletal: Negative.  Negative for myalgias and neck stiffness.   Skin: Negative.  Negative for color change and pallor.   Allergic/Immunologic: Negative.    Neurological: Negative.    Hematological: Negative.    Psychiatric/Behavioral: Negative.    All other systems reviewed and are negative.    Scheduled Meds:   aspirin  81 mg Oral Daily    atorvastatin  80 mg Oral Daily    chlorhexidine  10 mL Mouth/Throat BID    clopidogrel  75 mg Oral Daily    furosemide  40 mg Intravenous BID    metoprolol tartrate  25 mg Oral BID    nozaseptin   Each Nare BID    pantoprazole  40 mg Intravenous Daily    polyethylene glycol  17 g Oral Daily    potassium chloride  20 mEq Oral Q12H     Continuous Infusions:   dexmedetomidine (PRECEDEX) infusion Stopped (05/31/19 2100)    dextrose 5% lactated ringers 25 mL/hr at 06/01/19 0700    epinephrine infusion 0.02 mcg/kg/min (06/01/19 0700)    niCARdipine      vasopressin (PITRESSIN) infusion Stopped (06/01/19 0200)     PRN Meds:.sodium chloride, sodium chloride, acetaminophen, albumin human 5%, ceFAZolin (ANCEF) IVPB, chlorhexidine, dextrose 50%, dextrose 50%, glucagon (human recombinant), glucose, glucose,  insulin aspart U-100, lactated ringers, magnesium sulfate IVPB, metoclopramide HCl, metoprolol tartrate, nozaseptin, ondansetron, ondansetron, oxyCODONE, oxyCODONE, potassium chloride in water **AND** potassium chloride in water **AND** potassium chloride in water    Objective:     Vital Signs (Most Recent):  Temp: 100.1 °F (37.8 °C) (06/01/19 1200)  Pulse: 91 (06/01/19 1200)  Resp: (!) 35 (06/01/19 1200)  BP: 136/63 (06/01/19 1200)  SpO2: 95 % (06/01/19 1200) Vital Signs (24h Range):  Temp:  [99.9 °F (37.7 °C)-100.8 °F (38.2 °C)] 100.1 °F (37.8 °C)  Pulse:  [] 91  Resp:  [16-40] 35  SpO2:  [91 %-100 %] 95 %  BP: ()/(39-66) 136/63     Weight: 80.2 kg (176 lb 12.9 oz)  Body mass index is 27.69 kg/m².      Intake/Output Summary (Last 24 hours) at 6/1/2019 1218  Last data filed at 6/1/2019 1145  Gross per 24 hour   Intake 4415.8 ml   Output 2221 ml   Net 2194.8 ml       Physical Exam   Constitutional: He is oriented to person, place, and time. He appears well-developed and well-nourished. No distress.   HENT:   Head: Normocephalic and atraumatic.   Eyes: Pupils are equal, round, and reactive to light. EOM are normal.   Neck: Normal range of motion. Neck supple. No JVD present.   Cardiovascular: Normal rate, regular rhythm, normal heart sounds and intact distal pulses. Exam reveals no gallop and no friction rub.   No murmur heard.  Paced AAI. Hemodynamically stable. Mediastinal chest tubes in place x 2.    Pulmonary/Chest: Effort normal. No stridor. No respiratory distress. He has no wheezes. He has no rales. He exhibits tenderness.    TOD pleural chest tubes in place.     Abdominal: Soft. Bowel sounds are normal. He exhibits no distension. There is no tenderness. There is no guarding.   Musculoskeletal: Normal range of motion. He exhibits edema. He exhibits no tenderness or deformity.   1+ pitting edema to TOD LE, s/p EVH to TOD LE.    Neurological: He is alert and oriented to person, place, and time. He  displays normal reflexes. No cranial nerve deficit or sensory deficit. He exhibits normal muscle tone. Coordination normal.   Skin: Skin is warm and dry. Capillary refill takes less than 2 seconds. No rash noted. He is not diaphoretic. No erythema. No pallor.   Psychiatric: He has a normal mood and affect. His behavior is normal. Judgment and thought content normal.   Nursing note and vitals reviewed.      Vents:  Vent Mode: Spont (05/31/19 2050)  Set Rate: 0 bmp (05/31/19 2050)  Vt Set: 0 mL (05/31/19 2050)  Pressure Support: 10 cmH20 (05/31/19 2050)  PEEP/CPAP: 5 cmH20 (05/31/19 2050)  Oxygen Concentration (%): 40 (05/31/19 2103)  Peak Airway Pressure: 16 cmH2O (05/31/19 2050)  Plateau Pressure: 0 cmH20 (05/31/19 2050)  Total Ve: 11.5 mL (05/31/19 2050)  Negative Inspiratory Force (cm H2O): -32 (05/31/19 2104)  F/VT Ratio<105 (RSBI): (!) 53.76 (05/31/19 2050)    Lines/Drains/Airways     Central Venous Catheter Line                 Introducer 05/31/19 1039 right internal jugular 1 day         Percutaneous Central Line Insertion/Assessment - double lumen  05/31/19 0735 right internal jugular 1 day         Pulmonary Artery Catheter Assessment  05/31/19 1041 right internal jugular 1 day          Drain                 Closed/Suction Drain 05/31/19 1103 Right Leg Bulb 19 Fr. 1 day         Closed/Suction Drain 05/31/19 1105 Left Leg Bulb 19 Fr. 1 day         Urethral Catheter 05/31/19 0725 Latex;Straight-tip;Temperature probe 16 Fr. 1 day         Y Chest Tube 1 and 2 05/31/19 1302 1 Right Mediastinal 24 Fr. 2 Left Mediastinal 24 Fr. less than 1 day         Y Chest Tube 3 and 4 05/31/19 1303 3 Right Pleural 24 Fr. Left Pleural 24 Fr. less than 1 day          Arterial Line                 Arterial Line 05/31/19 0708 Right Radial 1 day          Line                 Pacer Wires 05/31/19 1530 less than 1 day          Peripheral Intravenous Line                 Peripheral IV - Single Lumen 05/29/19 0820 20 G Left Forearm 3  days         Midline Catheter Insertion/Assessment  - Single Lumen 06/01/19 0920 Left brachial vein 18g x 10cm less than 1 day                Significant Labs:    CBC/Anemia Profile:  Recent Labs   Lab 05/31/19  1456 05/31/19  1505 05/31/19 2117 06/01/19  0345   WBC 18.99*  --  17.74* 14.56*   HGB 9.0*  --  8.0* 7.3*   HCT 25.1* 22* 23.0* 20.0*   *  --  147* 124*   MCV 85  --  85 85   RDW 12.7  --  13.0 13.1        Chemistries:  Recent Labs   Lab 05/31/19  0543 05/31/19  1456 05/31/19 2117 06/01/19  0345   * 138  138 139 139   K 4.2 4.2  4.2 4.5 3.8    106  106 104 105   CO2 28 22*  22* 19* 24   BUN 13 13  13 15 17   CREATININE 0.9 1.0  1.0 1.2 1.1   CALCIUM 9.7 9.1  9.1 8.9 8.7   ALBUMIN 3.6  --   --   --    PROT 6.5  --   --   --    BILITOT 0.7  --   --   --    ALKPHOS 75  --   --   --    ALT 14  --   --   --    AST 27  --   --   --    MG 2.1 3.8* 3.0* 2.6   PHOS 3.5  --   --  2.9       ABGs:   Recent Labs   Lab 05/31/19  1505   PH 7.403   PCO2 46.5*   HCO3 29.0*   POCSATURATED 100   BE 4     Coagulation:   Recent Labs   Lab 06/01/19  0345   INR 1.1   APTT 32.6*  32.6*     POCT Glucose:   Recent Labs   Lab 06/01/19  0504 06/01/19  0609 06/01/19  0705   POCTGLUCOSE 150* 123* 117*       Significant Imaging:      Chest x-ray: Postoperative changes again identified.  Cardiac silhouette mildly enlarged.  Median sternotomy wires again noted.  Swans Kobi catheter remains in place.  Mediastinal drains in place.  Left-sided chest tube is unchanged.  Additional right-sided chest tube also unchanged.  No pneumothorax detected.  Mild atelectasis on the left.  No significant pulmonary vascular congestion or heart failure.  Interval removal endotracheal tube.

## 2019-06-01 NOTE — SUBJECTIVE & OBJECTIVE
Interval History:  06/01/2019  The patient is post-operative day 1, status-post coronary artery bypass grafting x 5.     ROS  Medications:  Continuous Infusions:   dexmedetomidine (PRECEDEX) infusion Stopped (05/31/19 2100)    dextrose 5% lactated ringers 25 mL/hr at 06/01/19 0700    epinephrine infusion 0.02 mcg/kg/min (06/01/19 0700)    niCARdipine      vasopressin (PITRESSIN) infusion Stopped (06/01/19 0200)     Scheduled Meds:   albuterol-ipratropium  3 mL Nebulization Q4H    aspirin  81 mg Oral Daily    atorvastatin  80 mg Oral Daily    calcium gluconate IVPB  3,000 mg Intravenous Once    chlorhexidine  10 mL Mouth/Throat BID    clopidogrel  75 mg Oral Daily    furosemide  40 mg Intravenous BID    metoprolol tartrate  25 mg Oral BID    nozaseptin   Each Nare BID    pantoprazole  40 mg Intravenous Daily    polyethylene glycol  17 g Oral Daily    potassium chloride  20 mEq Oral Q12H     PRN Meds:sodium chloride, sodium chloride, acetaminophen, albumin human 5%, ceFAZolin (ANCEF) IVPB, chlorhexidine, dextrose 50%, dextrose 50%, glucagon (human recombinant), glucose, glucose, insulin aspart U-100, lactated ringers, magnesium sulfate IVPB, metoclopramide HCl, metoprolol tartrate, nozaseptin, ondansetron, ondansetron, oxyCODONE, oxyCODONE, potassium chloride in water **AND** potassium chloride in water **AND** potassium chloride in water     Objective:     Vital Signs (Most Recent):  Temp: (!) 100.4 °F (38 °C) (06/01/19 0900)  Pulse: 100 (06/01/19 0900)  Resp: (!) 28 (06/01/19 0900)  BP: 104/60 (06/01/19 0600)  SpO2: 100 % (06/01/19 0900) Vital Signs (24h Range):  Temp:  [99.9 °F (37.7 °C)-100.8 °F (38.2 °C)] 100.4 °F (38 °C)  Pulse:  [] 100  Resp:  [16-40] 28  SpO2:  [91 %-100 %] 100 %  BP: ()/(43-63) 104/60     Weight: 80.2 kg (176 lb 12.9 oz)  Body mass index is 27.69 kg/m².    SpO2: 100 %  O2 Device (Oxygen Therapy): nasal cannula    Intake/Output - Last 3 Shifts       05/30 0700 -  05/31 0659 05/31 0700 - 06/01 0659 06/01 0700 - 06/02 0659    P.O. 800      I.V. (mL/kg) 213.5 (2.9) 3907.8 (48.7) 33 (0.4)    Blood  350 450    IV Piggyback  300     Total Intake(mL/kg) 1013.5 (13.8) 4557.8 (56.8) 483 (6)    Urine (mL/kg/hr) 1625 (0.9) 1301 (0.7) 15 (0.1)    Drains  105     Stool  0     Chest Tube  730 70    Total Output 1625 2136 85    Net -611.5 +2421.8 +398           Stool Occurrence  0 x           Lines/Drains/Airways     Central Venous Catheter Line                 Percutaneous Central Line Insertion/Assessment - double lumen  05/31/19 0735 right internal jugular 1 day         Introducer 05/31/19 1039 right internal jugular less than 1 day         Pulmonary Artery Catheter Assessment  05/31/19 1041 right internal jugular less than 1 day          Drain                 Urethral Catheter 05/31/19 0725 Latex;Straight-tip;Temperature probe 16 Fr. 1 day         Closed/Suction Drain 05/31/19 1103 Right Leg Bulb 19 Fr. less than 1 day         Closed/Suction Drain 05/31/19 1105 Left Leg Bulb 19 Fr. less than 1 day         Y Chest Tube 1 and 2 05/31/19 1302 1 Right Mediastinal 24 Fr. 2 Left Mediastinal 24 Fr. less than 1 day         Y Chest Tube 3 and 4 05/31/19 1303 3 Right Pleural 24 Fr. Left Pleural 24 Fr. less than 1 day          Arterial Line                 Arterial Line 05/31/19 0708 Right Radial 1 day          Line                 Pacer Wires 05/31/19 1530 less than 1 day          Peripheral Intravenous Line                 Peripheral IV - Single Lumen 05/29/19 0820 20 G Left Forearm 3 days         Midline Catheter Insertion/Assessment  - Single Lumen 06/01/19 0920 Left brachial vein 18g x 10cm less than 1 day                Physical Exam   Constitutional: He is oriented to person, place, and time. He appears well-developed and well-nourished. No distress.   HENT:   Head: Normocephalic and atraumatic.   Eyes: Pupils are equal, round, and reactive to light. EOM are normal.   Neck: Normal range of  motion. Neck supple. No JVD present.   Cardiovascular: Normal rate, regular rhythm, normal heart sounds and intact distal pulses. Exam reveals no gallop and no friction rub.   No murmur heard.  NSR paced AAI. Hemodynamically stable. Mediastinal chest tubes in place x 2.    Pulmonary/Chest: Effort normal. No stridor. No respiratory distress. He has no wheezes. He has no rales. He exhibits tenderness.   TOD rub noted on auscultation, r/t TOD pleural chest tubes in place. Generalized chest wall tenderness.    Abdominal: Soft. Bowel sounds are normal. He exhibits no distension. There is no tenderness. There is no guarding.   Musculoskeletal: Normal range of motion. He exhibits edema. He exhibits no tenderness or deformity.   1+ pitting edema to TOD LE, s/p EVH to TOD LE.    Neurological: He is alert and oriented to person, place, and time. He displays normal reflexes. No cranial nerve deficit or sensory deficit. He exhibits normal muscle tone. Coordination normal.   Skin: Skin is warm and dry. Capillary refill takes less than 2 seconds. No rash noted. He is not diaphoretic. No erythema. No pallor.   Psychiatric: He has a normal mood and affect. His behavior is normal. Judgment and thought content normal.   Nursing note and vitals reviewed.      Significant Labs:  BMP:   Recent Labs   Lab 06/01/19  0345   *      K 3.8      CO2 24   BUN 17   CREATININE 1.1   CALCIUM 8.7   MG 2.6     CBC:   Recent Labs   Lab 06/01/19  0345   WBC 14.56*   RBC 2.36*   HGB 7.3*   HCT 20.0*   *   MCV 85   MCH 30.9   MCHC 36.5*       Significant Diagnostics:  I have reviewed all pertinent imaging results/findings within the past 24 hours.

## 2019-06-01 NOTE — EICU
Mr. Calderon underwent emergent CABG, and is now extubated, hemodynamically stable on low dose epinephrine with CO ~ 6L. On heparin, clopidogrel, aspirin, metoprolol, atorvastatin.    Resting comfortably, no distress.      Dann Noonan MD

## 2019-06-01 NOTE — PROGRESS NOTES
Transfusions done. SG removed. Art line removed. EDUARD drains to R/L leg removed per Sid DUMAS. Midline cath placed. Pain controlled.

## 2019-06-01 NOTE — PROGRESS NOTES
Switched patient to SIMV a 1942, patient tolerated well. No respiratory distress noted. At 2019 switched patient to Spont, patient tolerated well. At 2100 extubated patient to 5 LPM NC. Patient tolerates well. No respiratory distress noted. Respiratory to follow.

## 2019-06-01 NOTE — ASSESSMENT & PLAN NOTE
POD # 1:     Paced: Mode AAI, Rate 90, A mA 20, V mA 25:  normal pacemaker function and stable pacing and sensing thresholds ; Multiple drains: Monitor output.     STERNAL INCISION: staples in place; wound clean, dry, and intact,     STABLE HEMODYNAMICS.    Monitor hemodynamics and  monitor for dysrhythmias MAP goal of  65 mmHg.      CARDIAC STERNAL PRECAUTIONS.       CARDIOACTIVE MEDS:   EPINEPHRINE,CARDENE      DIURETICS:  FUROSEMIDE

## 2019-06-01 NOTE — ASSESSMENT & PLAN NOTE
[x]        Exercise-based cardiac rehabilitation   [x]        Weight management is recommended by the AHA for the secondary prevention  []        Smoking cessation counseling.  [x]        Treatment of High Blood Pressure   (goal of < 140/90 mm Hg // < 130/80 mm Hg in patients with DM or CKD)  [x]        Beta-blocker therapy.  [x]        Aspirin therapy (81 to 162 mg daily)   [x]        Statin therapy.   [x]        ACE inhibitor therapy.  [x]        Antiplatelet Agent therapy.  []        Influenza Vaccination.

## 2019-06-01 NOTE — SUBJECTIVE & OBJECTIVE
Review of Systems   Constitution: Positive for malaise/fatigue.   HENT: Negative.    Eyes: Negative.    Cardiovascular: Negative.    Respiratory: Negative.    Endocrine: Negative.    Hematologic/Lymphatic: Negative.    Skin: Negative.    Musculoskeletal: Negative.    Gastrointestinal: Negative.    Genitourinary: Negative.    Neurological: Positive for weakness.   Psychiatric/Behavioral: Negative.    Allergic/Immunologic: Negative.      Objective:     Vital Signs (Most Recent):  Temp: 100.1 °F (37.8 °C) (06/01/19 1200)  Pulse: 91 (06/01/19 1200)  Resp: (!) 35 (06/01/19 1200)  BP: 136/63 (06/01/19 1200)  SpO2: 95 % (06/01/19 1200) Vital Signs (24h Range):  Temp:  [99.9 °F (37.7 °C)-100.8 °F (38.2 °C)] 100.1 °F (37.8 °C)  Pulse:  [] 91  Resp:  [16-40] 35  SpO2:  [90 %-100 %] 95 %  BP: ()/(39-66) 136/63     Weight: 80.2 kg (176 lb 12.9 oz)  Body mass index is 27.69 kg/m².     SpO2: 95 %  O2 Device (Oxygen Therapy): nasal cannula      Intake/Output Summary (Last 24 hours) at 6/1/2019 1236  Last data filed at 6/1/2019 1200  Gross per 24 hour   Intake 5443.1 ml   Output 2446 ml   Net 2997.1 ml       Lines/Drains/Airways     Drain                 Urethral Catheter 05/31/19 0725 Latex;Straight-tip;Temperature probe 16 Fr. 1 day         Y Chest Tube 1 and 2 05/31/19 1302 1 Right Mediastinal 24 Fr. 2 Left Mediastinal 24 Fr. less than 1 day         Y Chest Tube 3 and 4 05/31/19 1303 3 Right Pleural 24 Fr. Left Pleural 24 Fr. less than 1 day          Line                 Pacer Wires 05/31/19 1530 less than 1 day          Peripheral Intravenous Line                 Peripheral IV - Single Lumen 05/29/19 0820 20 G Left Forearm 3 days         Midline Catheter Insertion/Assessment  - Single Lumen 06/01/19 0920 Left brachial vein 18g x 10cm less than 1 day                Physical Exam   Constitutional: He is oriented to person, place, and time. He appears well-developed and well-nourished.   HENT:   Head:  Normocephalic.   Neck: Normal range of motion. Neck supple. Normal carotid pulses, no hepatojugular reflux and no JVD present. Carotid bruit is not present. No thyromegaly present.   Cardiovascular: Normal rate, regular rhythm, S1 normal and S2 normal. PMI is not displaced. Exam reveals no S3, no S4, no distant heart sounds, no friction rub, no midsystolic click and no opening snap.   No murmur heard.  Pulses:       Radial pulses are 2+ on the right side, and 2+ on the left side.   Pulmonary/Chest: Effort normal and breath sounds normal. He has no wheezes. He has no rales.   Abdominal: Soft. Bowel sounds are normal. He exhibits no distension, no abdominal bruit, no ascites and no mass. There is no tenderness.   Musculoskeletal: He exhibits no edema.   Neurological: He is alert and oriented to person, place, and time.   Skin: Skin is warm.   Psychiatric: He has a normal mood and affect. His behavior is normal.   Nursing note and vitals reviewed.      Significant Labs:   BMP:   Recent Labs   Lab 05/31/19  1456 05/31/19 2117 06/01/19  0345   *  131* 262* 160*     138 139 139   K 4.2  4.2 4.5 3.8     106 104 105   CO2 22*  22* 19* 24   BUN 13  13 15 17   CREATININE 1.0  1.0 1.2 1.1   CALCIUM 9.1  9.1 8.9 8.7   MG 3.8* 3.0* 2.6   , CMP   Recent Labs   Lab 05/31/19  0543 05/31/19  1456 05/31/19 2117 06/01/19  0345   * 138  138 139 139   K 4.2 4.2  4.2 4.5 3.8    106  106 104 105   CO2 28 22*  22* 19* 24   GLU 92 131*  131* 262* 160*   BUN 13 13  13 15 17   CREATININE 0.9 1.0  1.0 1.2 1.1   CALCIUM 9.7 9.1  9.1 8.9 8.7   PROT 6.5  --   --   --    ALBUMIN 3.6  --   --   --    BILITOT 0.7  --   --   --    ALKPHOS 75  --   --   --    AST 27  --   --   --    ALT 14  --   --   --    ANIONGAP 7* 10  10 16 10   ESTGFRAFRICA >60 >60  >60 >60 >60   EGFRNONAA >60 >60  >60 59* >60   , CBC   Recent Labs   Lab 05/31/19  1456  05/31/19  2117 06/01/19  0345   WBC 18.99*  --  17.74*  14.56*   HGB 9.0*  --  8.0* 7.3*   HCT 25.1*   < > 23.0* 20.0*   *  --  147* 124*    < > = values in this interval not displayed.   , INR   Recent Labs   Lab 05/31/19  1356 05/31/19  1456 06/01/19  0345   INR 1.6* 1.2 1.1   , Lipid Panel No results for input(s): CHOL, HDL, LDLCALC, TRIG, CHOLHDL in the last 48 hours. and Troponin No results for input(s): TROPONINI in the last 48 hours.    Significant Imaging: Echocardiogram:   2D echo with color flow doppler:   Results for orders placed or performed during the hospital encounter of 05/29/19   2D echo with color flow doppler   Result Value Ref Range    QEF 60 55 - 65    Diastolic Dysfunction No     Est. PA Systolic Pressure 30.46

## 2019-06-01 NOTE — HOSPITAL COURSE
06/01/2019  The patient is post-operative day 1, status-post coronary artery bypass grafting x 5.     06/02/2019  The patient is post-operative day 2, status-post coronary artery bypass grafting x 5.     06/03/2019  The patient is post-operative day 3, status-post coronary artery bypass grafting x 5.    06/04/2019  The patient is post-operative day 4, status-post coronary artery bypass grafting x 5.    06/05/2019  The patient is post-operative day 5, status-post coronary artery bypass grafting x 5.
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
## 2019-06-01 NOTE — PROGRESS NOTES
[facility] -   Cardiology  Progress Note    Patient Name: Chapin Calderon  MRN: [id number]  Admission Date: 5/29/2019  Hospital Length of Stay: 3 days  Code Status: Full Code   Attending Physician: Mani Dorado MD   Primary Care Physician: Tiffanie Vicente MD  Expected Discharge Date: 6/7/2019  Principal Problem:NSTEMI (non-ST elevated myocardial infarction)    Subjective:       HPI:  Pt c/o chest pain, mid chest, all day yesterday which prompted him to go to ER overnight.  Cp much improved now, seems mostly resolved.  ecg shows NSR, low voltage, possible septal infarct.  Troponin 6.2 on initial labs.   .  No prior h/o CAD.  Active male with no significant health issues otherwise per pt.    Hospital Course:   5/30/19--Patient seen and examined in room, lying in bed. Denies chest pain or SOB today. IV heparin gtt in place. Plans for CABG tomorrow per CT surgery. Labs reviewed, K+ 4.5, Cr 0.8, Mag 1.9.     5/31/19--Patient seen and examined in ICU, intubated and sedated post CABG. CABG x 5V per Dr. Dorado today. Labs reviewed, INR 1.2, K+ 4.2, Cr 1.0, Mag 3.8, H/H 9/25.1.     6/1/19:  PT SEEN AND EXAMINED IN ICU.  EXTUBATED.  NO ANGINA.  USUAL POST OP DISCOMFORT.  NO DYSPNEA.  LABS SHOWS SEVERE POST OP ANEMIA.  RECEIVING PRBC THIS AM.        Review of Systems   Constitution: Positive for malaise/fatigue.   HENT: Negative.    Eyes: Negative.    Cardiovascular: Negative.    Respiratory: Negative.    Endocrine: Negative.    Hematologic/Lymphatic: Negative.    Skin: Negative.    Musculoskeletal: Negative.    Gastrointestinal: Negative.    Genitourinary: Negative.    Neurological: Positive for weakness.   Psychiatric/Behavioral: Negative.    Allergic/Immunologic: Negative.      Objective:     Vital Signs (Most Recent):  Temp: 100.1 °F (37.8 °C) (06/01/19 1200)  Pulse: 91 (06/01/19 1200)  Resp: (!) 35 (06/01/19 1200)  BP: 136/63 (06/01/19 1200)  SpO2: 95 % (06/01/19 1200) Vital Signs (24h  Range):  Temp:  [99.9 °F (37.7 °C)-100.8 °F (38.2 °C)] 100.1 °F (37.8 °C)  Pulse:  [] 91  Resp:  [16-40] 35  SpO2:  [90 %-100 %] 95 %  BP: ()/(39-66) 136/63     Weight: 80.2 kg (176 lb 12.9 oz)  Body mass index is 27.69 kg/m².     SpO2: 95 %  O2 Device (Oxygen Therapy): nasal cannula      Intake/Output Summary (Last 24 hours) at 6/1/2019 1236  Last data filed at 6/1/2019 1200  Gross per 24 hour   Intake 5443.1 ml   Output 2446 ml   Net 2997.1 ml       Lines/Drains/Airways     Drain                 Urethral Catheter 05/31/19 0725 Latex;Straight-tip;Temperature probe 16 Fr. 1 day         Y Chest Tube 1 and 2 05/31/19 1302 1 Right Mediastinal 24 Fr. 2 Left Mediastinal 24 Fr. less than 1 day         Y Chest Tube 3 and 4 05/31/19 1303 3 Right Pleural 24 Fr. Left Pleural 24 Fr. less than 1 day          Line                 Pacer Wires 05/31/19 1530 less than 1 day          Peripheral Intravenous Line                 Peripheral IV - Single Lumen 05/29/19 0820 20 G Left Forearm 3 days         Midline Catheter Insertion/Assessment  - Single Lumen 06/01/19 0920 Left brachial vein 18g x 10cm less than 1 day                Physical Exam   Constitutional: He is oriented to person, place, and time. He appears well-developed and well-nourished.   HENT:   Head: Normocephalic.   Neck: Normal range of motion. Neck supple. Normal carotid pulses, no hepatojugular reflux and no JVD present. Carotid bruit is not present. No thyromegaly present.   Cardiovascular: Normal rate, regular rhythm, S1 normal and S2 normal. PMI is not displaced. Exam reveals no S3, no S4, no distant heart sounds, no friction rub, no midsystolic click and no opening snap.   No murmur heard.  Pulses:       Radial pulses are 2+ on the right side, and 2+ on the left side.   Pulmonary/Chest: Effort normal and breath sounds normal. He has no wheezes. He has no rales.   Abdominal: Soft. Bowel sounds are normal. He exhibits no distension, no abdominal bruit,  no ascites and no mass. There is no tenderness.   Musculoskeletal: He exhibits no edema.   Neurological: He is alert and oriented to person, place, and time.   Skin: Skin is warm.   Psychiatric: He has a normal mood and affect. His behavior is normal.   Nursing note and vitals reviewed.      Significant Labs:   BMP:   Recent Labs   Lab 05/31/19 1456 05/31/19 2117 06/01/19  0345   *  131* 262* 160*     138 139 139   K 4.2  4.2 4.5 3.8     106 104 105   CO2 22*  22* 19* 24   BUN 13  13 15 17   CREATININE 1.0  1.0 1.2 1.1   CALCIUM 9.1  9.1 8.9 8.7   MG 3.8* 3.0* 2.6   , CMP   Recent Labs   Lab 05/31/19  0543 05/31/19 1456 05/31/19 2117 06/01/19  0345   * 138  138 139 139   K 4.2 4.2  4.2 4.5 3.8    106  106 104 105   CO2 28 22*  22* 19* 24   GLU 92 131*  131* 262* 160*   BUN 13 13  13 15 17   CREATININE 0.9 1.0  1.0 1.2 1.1   CALCIUM 9.7 9.1  9.1 8.9 8.7   PROT 6.5  --   --   --    ALBUMIN 3.6  --   --   --    BILITOT 0.7  --   --   --    ALKPHOS 75  --   --   --    AST 27  --   --   --    ALT 14  --   --   --    ANIONGAP 7* 10  10 16 10   ESTGFRAFRICA >60 >60  >60 >60 >60   EGFRNONAA >60 >60  >60 59* >60   , CBC   Recent Labs   Lab 05/31/19 1456 05/31/19 2117 06/01/19  0345   WBC 18.99*  --  17.74* 14.56*   HGB 9.0*  --  8.0* 7.3*   HCT 25.1*   < > 23.0* 20.0*   *  --  147* 124*    < > = values in this interval not displayed.   , INR   Recent Labs   Lab 05/31/19  1356 05/31/19 1456 06/01/19  0345   INR 1.6* 1.2 1.1   , Lipid Panel No results for input(s): CHOL, HDL, LDLCALC, TRIG, CHOLHDL in the last 48 hours. and Troponin No results for input(s): TROPONINI in the last 48 hours.    Significant Imaging: Echocardiogram:   2D echo with color flow doppler:   Results for orders placed or performed during the hospital encounter of 05/29/19   2D echo with color flow doppler   Result Value Ref Range    QEF 60 55 - 65    Diastolic Dysfunction No     Est. PA  Systolic Pressure 30.46      Assessment and Plan:     S/P NSTEMI WITH SEVERE MULTIVESSEL CAD NOTED ON CATH.  S/P CABG.  CONTINUE POST OP CARE/MGT.  PRBC TRANSFUSION TODAY TO ACHIEVE HER HEMOGLOBIN.  CHEST TUBE TO STAY IN TODAY PER CVT.  CONTINUE CURRENT MEDS.  INCENTIVE SPIROMETRY.  WILL FOLLOW.      * NSTEMI (non-ST elevated myocardial infarction)   5/30/19  -LHC completed per Dr. Chase which revealed severe multivessel CAD and CT surgery consulted  -Plans for CABG per Dr. Dorado tomorrow in place  -Continue ASA, Statin, IV heparin gtt, BB  -Unable to add ACEi/ARB for now given soft BP, will attempt pending clinical course  -ECHO revealed EF 60-65%    5/31  -see plan for s/p CABG    Anemia following surgery  POST OP RELATED ANEMIA.  PRBC TRANSFUSION 6/1/19.    CAD, multiple vessel  See management plan detailed above.     S/P CABG x 5  CABG x 5V per CT surgery  Intubated, sedated in ICU today post surgery  Mgmt per CT surgery  Continue ASA, Statin, BB  Resume ARB once OK with CT surgery  Will continue to follow along    Coronary artery disease involving native coronary artery of native heart with angina pectoris  See plan for s/p CABG    AP (angina pectoris)  See management plan detailed above.     Abnormal ECG  See management plan detailed above.         VTE Risk Mitigation (From admission, onward)        Ordered     IP VTE LOW RISK PATIENT  Once      05/30/19 0752     Place SKY hose  Until discontinued      05/30/19 0752     Place sequential compression device  Until discontinued      05/30/19 0752     heparin 25,000 units in dextrose 5% 250 mL (100 units/mL) infusion LOW INTENSITY nomogram - [facility]  Continuous      05/29/19 2001     Reason for no Mechanical VTE Prophylaxis  Once      05/29/19 1431          Reid Chase MD  Cardiology  [facility]

## 2019-06-01 NOTE — PT/OT/SLP EVAL
Physical Therapy Evaluation    Patient Name:  Chapin Calderon   MRN:  [id number]    Recommendations:     Discharge Recommendations:  home, home health PT   Discharge Equipment Recommendations: shower chair   Barriers to discharge: None    Assessment:     Chapin Calderon is a 73 y.o. male admitted with a medical diagnosis of NSTEMI (non-ST elevated myocardial infarction).  He presents with the following impairments/functional limitations:  weakness, impaired endurance, impaired self care skills, impaired functional mobilty, gait instability, impaired balance, decreased safety awareness, pain, impaired cardiopulmonary response to activity .    Rehab Prognosis: Good; patient would benefit from acute skilled PT services to address these deficits and reach maximum level of function.    Recent Surgery: Procedure(s) (LRB):  CORONARY ARTERY BYPASS GRAFT (CABG) (N/A)  ECHOCARDIOGRAM,TRANSESOPHAGEAL (N/A)  SURGICAL PROCUREMENT, VEIN, ENDOSCOPIC (Bilateral) 1 Day Post-Op    Plan:     During this hospitalization, patient to be seen 5 x/week to address the identified rehab impairments via gait training, therapeutic activities, therapeutic exercises and progress toward the following goals:    · Plan of Care Expires:  06/08/19    Subjective     Chief Complaint: soreness; weakness and min sob  Patient/Family Comments/goals: to get stronger, walk and go home  Pain/Comfort:  · Pain Rating 1: 4/10  · Location - Orientation 1: anterior  · Location 1: chest  · Pain Addressed 1: Reposition, Cessation of Activity  · Pain Rating Post-Intervention 1: 0/10(at rest)    Patients cultural, spiritual, Rastafari conflicts given the current situation:      Living Environment:  Lives with wife - 2 story home but he does NOT go upstairs  Prior to admission, patients level of function was indep.  Equipment used at home: none.  DME owned (not currently used): none.  Upon discharge, patient will have assistance from wife.    Objective:     Communicated  with VERONICA Sanchez prior to session.  Patient found up in chair with wound vac, telemetry, pulse ox (continuous), peripheral IV, oxygen, cordero catheter, blood pressure cuff, arterial line, chest tube  upon PT entry to room.    General Precautions: Standard, sternal, fall   Orthopedic Precautions:N/A   Braces: N/A     Exams:  · B LE 3+/5 grossly and B LE ROM wfl    Functional Mobility:  · Bed Mobility - mod A with cues for technique per sternal precautions  · Transfers - min/mod A with cues for rocking to use momentum - sit to/from stand  · Gt - 3 steps to chair min A for balance/safety      Therapeutic Activities and Exercises:   PT educated patient/wife on POC, LE TE to do in chair, sternal/fall/safety precautions with mobility and d/c recs.    AM-PAC 6 CLICK MOBILITY  Total Score:11     Patient left up in chair with all lines intact, call button in reach, RN notified and wife present.    GOALS:   Multidisciplinary Problems     Physical Therapy Goals        Problem: Physical Therapy Goal    Goal Priority Disciplines Outcome Goal Variances Interventions   Physical Therapy Goal     PT, PT/OT      Description:  1. Patient will perform sup to/from sit cga  2. Patient will perform sit to/from stand sba to prep amb  3. Patient will amb >300ft sba no gross LOB                    History:     Past Medical History:   Diagnosis Date    Prostate cancer        Past Surgical History:   Procedure Laterality Date    APPENDECTOMY         Time Tracking:     PT Received On: 05/31/19  PT Start Time: 0715     PT Stop Time: 0740  PT Total Time (min): 25 min     Billable Minutes: Evaluation 15 and Therapeutic Activity 10      Mckay Cooper, PT  06/01/2019

## 2019-06-01 NOTE — PROGRESS NOTES
[facility] -   Cardiothoracic Surgery  Progress Note    Patient Name: Chapin Calderon  MRN: [id number]  Admission Date: 5/29/2019  Hospital Length of Stay: 3 days  Code Status: Full Code   Attending Physician: Mani Dorado MD   Referring Provider: Self, Aaareferral  Principal Problem:NSTEMI (non-ST elevated myocardial infarction)            Subjective:     Post-Op Info:  Procedure(s) (LRB):  CORONARY ARTERY BYPASS GRAFT (CABG) (N/A)  ECHOCARDIOGRAM,TRANSESOPHAGEAL (N/A)  SURGICAL PROCUREMENT, VEIN, ENDOSCOPIC (Bilateral)   1 Day Post-Op     Interval History:  06/01/2019  The patient is post-operative day 1, status-post coronary artery bypass grafting x 5.     ROS  Medications:  Continuous Infusions:   dexmedetomidine (PRECEDEX) infusion Stopped (05/31/19 2100)    dextrose 5% lactated ringers 25 mL/hr at 06/01/19 0700    epinephrine infusion 0.02 mcg/kg/min (06/01/19 0700)    niCARdipine      vasopressin (PITRESSIN) infusion Stopped (06/01/19 0200)     Scheduled Meds:   albuterol-ipratropium  3 mL Nebulization Q4H    aspirin  81 mg Oral Daily    atorvastatin  80 mg Oral Daily    calcium gluconate IVPB  3,000 mg Intravenous Once    chlorhexidine  10 mL Mouth/Throat BID    clopidogrel  75 mg Oral Daily    furosemide  40 mg Intravenous BID    metoprolol tartrate  25 mg Oral BID    nozaseptin   Each Nare BID    pantoprazole  40 mg Intravenous Daily    polyethylene glycol  17 g Oral Daily    potassium chloride  20 mEq Oral Q12H     PRN Meds:sodium chloride, sodium chloride, acetaminophen, albumin human 5%, ceFAZolin (ANCEF) IVPB, chlorhexidine, dextrose 50%, dextrose 50%, glucagon (human recombinant), glucose, glucose, insulin aspart U-100, lactated ringers, magnesium sulfate IVPB, metoclopramide HCl, metoprolol tartrate, nozaseptin, ondansetron, ondansetron, oxyCODONE, oxyCODONE, potassium chloride in water **AND** potassium chloride in water **AND** potassium chloride in water      Objective:     Vital Signs (Most Recent):  Temp: (!) 100.4 °F (38 °C) (06/01/19 0900)  Pulse: 100 (06/01/19 0900)  Resp: (!) 28 (06/01/19 0900)  BP: 104/60 (06/01/19 0600)  SpO2: 100 % (06/01/19 0900) Vital Signs (24h Range):  Temp:  [99.9 °F (37.7 °C)-100.8 °F (38.2 °C)] 100.4 °F (38 °C)  Pulse:  [] 100  Resp:  [16-40] 28  SpO2:  [91 %-100 %] 100 %  BP: ()/(43-63) 104/60     Weight: 80.2 kg (176 lb 12.9 oz)  Body mass index is 27.69 kg/m².    SpO2: 100 %  O2 Device (Oxygen Therapy): nasal cannula    Intake/Output - Last 3 Shifts       05/30 0700 - 05/31 0659 05/31 0700 - 06/01 0659 06/01 0700 - 06/02 0659    P.O. 800      I.V. (mL/kg) 213.5 (2.9) 3907.8 (48.7) 33 (0.4)    Blood  350 450    IV Piggyback  300     Total Intake(mL/kg) 1013.5 (13.8) 4557.8 (56.8) 483 (6)    Urine (mL/kg/hr) 1625 (0.9) 1301 (0.7) 15 (0.1)    Drains  105     Stool  0     Chest Tube  730 70    Total Output 1625 2136 85    Net -611.5 +2421.8 +398           Stool Occurrence  0 x           Lines/Drains/Airways     Central Venous Catheter Line                 Percutaneous Central Line Insertion/Assessment - double lumen  05/31/19 0735 right internal jugular 1 day         Introducer 05/31/19 1039 right internal jugular less than 1 day         Pulmonary Artery Catheter Assessment  05/31/19 1041 right internal jugular less than 1 day          Drain                 Urethral Catheter 05/31/19 0725 Latex;Straight-tip;Temperature probe 16 Fr. 1 day         Closed/Suction Drain 05/31/19 1103 Right Leg Bulb 19 Fr. less than 1 day         Closed/Suction Drain 05/31/19 1105 Left Leg Bulb 19 Fr. less than 1 day         Y Chest Tube 1 and 2 05/31/19 1302 1 Right Mediastinal 24 Fr. 2 Left Mediastinal 24 Fr. less than 1 day         Y Chest Tube 3 and 4 05/31/19 1303 3 Right Pleural 24 Fr. Left Pleural 24 Fr. less than 1 day          Arterial Line                 Arterial Line 05/31/19 0708 Right Radial 1 day          Line                  Pacer Wires 05/31/19 1530 less than 1 day          Peripheral Intravenous Line                 Peripheral IV - Single Lumen 05/29/19 0820 20 G Left Forearm 3 days         Midline Catheter Insertion/Assessment  - Single Lumen 06/01/19 0920 Left brachial vein 18g x 10cm less than 1 day                Physical Exam   Constitutional: He is oriented to person, place, and time. He appears well-developed and well-nourished. No distress.   HENT:   Head: Normocephalic and atraumatic.   Eyes: Pupils are equal, round, and reactive to light. EOM are normal.   Neck: Normal range of motion. Neck supple. No JVD present.   Cardiovascular: Normal rate, regular rhythm, normal heart sounds and intact distal pulses. Exam reveals no gallop and no friction rub.   No murmur heard.  NSR paced AAI. Hemodynamically stable. Mediastinal chest tubes in place x 2.    Pulmonary/Chest: Effort normal. No stridor. No respiratory distress. He has no wheezes. He has no rales. He exhibits tenderness.   TOD rub noted on auscultation, r/t TOD pleural chest tubes in place. Generalized chest wall tenderness.    Abdominal: Soft. Bowel sounds are normal. He exhibits no distension. There is no tenderness. There is no guarding.   Musculoskeletal: Normal range of motion. He exhibits edema. He exhibits no tenderness or deformity.   1+ pitting edema to OTD LE, s/p EVH to OTD LE.    Neurological: He is alert and oriented to person, place, and time. He displays normal reflexes. No cranial nerve deficit or sensory deficit. He exhibits normal muscle tone. Coordination normal.   Skin: Skin is warm and dry. Capillary refill takes less than 2 seconds. No rash noted. He is not diaphoretic. No erythema. No pallor.   Psychiatric: He has a normal mood and affect. His behavior is normal. Judgment and thought content normal.   Nursing note and vitals reviewed.      Significant Labs:  BMP:   Recent Labs   Lab 06/01/19  0345   *      K 3.8      CO2 24   BUN  17   CREATININE 1.1   CALCIUM 8.7   MG 2.6     CBC:   Recent Labs   Lab 06/01/19  0345   WBC 14.56*   RBC 2.36*   HGB 7.3*   HCT 20.0*   *   MCV 85   MCH 30.9   MCHC 36.5*       Significant Diagnostics:  I have reviewed all pertinent imaging results/findings within the past 24 hours.    Assessment/Plan:     S/P CABG x 5       06/01/2019  The patient is post-operative day 1, status-post coronary artery bypass grafting x 5.  Overall the patient is doing well.    Neuro:  The patient is awake alert and oriented x3.  Neuro exam is nonfocal.  Pain is well controlled. D/C fentanyl.   Cardiac:  Patient is off all gtts.  Excellent cardiac index.  Patient will be started on metoprolol.   Respiratory: Good sats on nasal cannula.  Continue respiratory toilet, continue incentive spirometer.  GI:  Advance diet as tolerated. Cardiac diet.    Renal:  Good urine output. Cr 1.1. K 3.8. K replaced. Mag 2.6.  Heme:  H ct 20.  Pl t 124. Administer 2 units PRBC. Continue Aspirin and Plavix.   Id:  White count is 14. Tmax 100.8  Suspect secondary to stress of surgery. Will continue to follow.  Endocrine:  Glucose is controlled with insulin gtt.   Discontinue insulin gtt. Start sliding scale insulin.   Activities:  Patient is out of bed to chair.  Advance activities as tolerated.  Lines tubes and drains:  Discontinue [place] and [place].  Discontinue A-line. Discontinue cordero catheter. Discontinue AV x2. Continue chest tubes x 4 for now.  Continue pacer wires. Start midline.            Coronary artery disease involving native coronary artery of native heart with angina pectoris  05/29/2019  The patient is a 73 year old male with 80% proximal LAD stenosis that was worked up for NSTEMI. The patient has a history of prostate cancer. The patient with 80% proximal LAD stenosis and multivessel coronary artery disease is a candidate for urgent coronary artery bypass grafting. The risks and benefits of surgery have been explained to the  patient, his wife, and his adult daughter, in great detail. All questions have been answered to the patient's satisfaction. The patient has agreed to go ahead with coronary artery bypass grafting, which will be scheduled for 05/31/2019.         Duane Carpenter NP  Cardiothoracic Surgery  [facility]

## 2019-06-02 PROBLEM — I21.4 NSTEMI (NON-ST ELEVATED MYOCARDIAL INFARCTION): Status: RESOLVED | Noted: 2019-05-29 | Resolved: 2019-06-02

## 2019-06-02 LAB
ANION GAP SERPL CALC-SCNC: 6 MMOL/L (ref 8–16)
ANION GAP SERPL CALC-SCNC: 8 MMOL/L (ref 8–16)
ANION GAP SERPL CALC-SCNC: 9 MMOL/L (ref 8–16)
APTT BLDCRRT: 26.8 SEC (ref 21–32)
BASOPHILS # BLD AUTO: 0.01 K/UL (ref 0–0.2)
BASOPHILS NFR BLD: 0 % (ref 0–1.9)
BUN SERPL-MCNC: 25 MG/DL (ref 8–23)
BUN SERPL-MCNC: 27 MG/DL (ref 8–23)
BUN SERPL-MCNC: 27 MG/DL (ref 8–23)
CALCIUM SERPL-MCNC: 8.9 MG/DL (ref 8.7–10.5)
CALCIUM SERPL-MCNC: 9.1 MG/DL (ref 8.7–10.5)
CALCIUM SERPL-MCNC: 9.1 MG/DL (ref 8.7–10.5)
CHLORIDE SERPL-SCNC: 95 MMOL/L (ref 95–110)
CHLORIDE SERPL-SCNC: 97 MMOL/L (ref 95–110)
CHLORIDE SERPL-SCNC: 98 MMOL/L (ref 95–110)
CO2 SERPL-SCNC: 28 MMOL/L (ref 23–29)
CO2 SERPL-SCNC: 29 MMOL/L (ref 23–29)
CO2 SERPL-SCNC: 31 MMOL/L (ref 23–29)
CREAT SERPL-MCNC: 1 MG/DL (ref 0.5–1.4)
DIFFERENTIAL METHOD: ABNORMAL
EOSINOPHIL # BLD AUTO: 0 K/UL (ref 0–0.5)
EOSINOPHIL NFR BLD: 0 % (ref 0–8)
ERYTHROCYTE [DISTWIDTH] IN BLOOD BY AUTOMATED COUNT: 14.7 % (ref 11.5–14.5)
EST. GFR  (AFRICAN AMERICAN): >60 ML/MIN/1.73 M^2
EST. GFR  (NON AFRICAN AMERICAN): >60 ML/MIN/1.73 M^2
GLUCOSE SERPL-MCNC: 118 MG/DL (ref 70–110)
GLUCOSE SERPL-MCNC: 121 MG/DL (ref 70–110)
GLUCOSE SERPL-MCNC: 128 MG/DL (ref 70–110)
HCT VFR BLD AUTO: 27.8 % (ref 40–54)
HGB BLD-MCNC: 9.7 G/DL (ref 14–18)
LYMPHOCYTES # BLD AUTO: 1.3 K/UL (ref 1–4.8)
LYMPHOCYTES NFR BLD: 5.6 % (ref 18–48)
MAGNESIUM SERPL-MCNC: 2.2 MG/DL (ref 1.6–2.6)
MAGNESIUM SERPL-MCNC: 2.4 MG/DL (ref 1.6–2.6)
MCH RBC QN AUTO: 29.8 PG (ref 27–31)
MCHC RBC AUTO-ENTMCNC: 34.9 G/DL (ref 32–36)
MCV RBC AUTO: 85 FL (ref 82–98)
MONOCYTES # BLD AUTO: 2.3 K/UL (ref 0.3–1)
MONOCYTES NFR BLD: 10.3 % (ref 4–15)
NEUTROPHILS # BLD AUTO: 18.7 K/UL (ref 1.8–7.7)
NEUTROPHILS NFR BLD: 84.4 % (ref 38–73)
PHOSPHATE SERPL-MCNC: 3.1 MG/DL (ref 2.7–4.5)
PLATELET # BLD AUTO: 114 K/UL (ref 150–350)
PMV BLD AUTO: 9.7 FL (ref 9.2–12.9)
POCT GLUCOSE: 120 MG/DL (ref 70–110)
POCT GLUCOSE: 127 MG/DL (ref 70–110)
POCT GLUCOSE: 129 MG/DL (ref 70–110)
POCT GLUCOSE: 134 MG/DL (ref 70–110)
POCT GLUCOSE: 137 MG/DL (ref 70–110)
POTASSIUM SERPL-SCNC: 4.1 MMOL/L (ref 3.5–5.1)
POTASSIUM SERPL-SCNC: 4.8 MMOL/L (ref 3.5–5.1)
POTASSIUM SERPL-SCNC: 5 MMOL/L (ref 3.5–5.1)
PROCALCITONIN SERPL IA-MCNC: 1.06 NG/ML
RBC # BLD AUTO: 3.26 M/UL (ref 4.6–6.2)
SODIUM SERPL-SCNC: 132 MMOL/L (ref 136–145)
SODIUM SERPL-SCNC: 134 MMOL/L (ref 136–145)
SODIUM SERPL-SCNC: 135 MMOL/L (ref 136–145)
WBC # BLD AUTO: 22.28 K/UL (ref 3.9–12.7)

## 2019-06-02 PROCEDURE — 85025 COMPLETE CBC W/AUTO DIFF WBC: CPT

## 2019-06-02 PROCEDURE — 97530 THERAPEUTIC ACTIVITIES: CPT

## 2019-06-02 PROCEDURE — S0073 INJECTION, AZTREONAM, 500 MG: HCPCS | Performed by: INTERNAL MEDICINE

## 2019-06-02 PROCEDURE — 99233 PR SUBSEQUENT HOSPITAL CARE,LEVL III: ICD-10-PCS | Mod: ,,, | Performed by: INTERNAL MEDICINE

## 2019-06-02 PROCEDURE — 25000003 PHARM REV CODE 250: Performed by: NURSE PRACTITIONER

## 2019-06-02 PROCEDURE — 99233 SBSQ HOSP IP/OBS HIGH 50: CPT | Mod: ,,, | Performed by: INTERNAL MEDICINE

## 2019-06-02 PROCEDURE — P9045 ALBUMIN (HUMAN), 5%, 250 ML: HCPCS | Mod: JG | Performed by: NURSE PRACTITIONER

## 2019-06-02 PROCEDURE — 25000003 PHARM REV CODE 250: Performed by: INTERNAL MEDICINE

## 2019-06-02 PROCEDURE — 84145 PROCALCITONIN (PCT): CPT

## 2019-06-02 PROCEDURE — 97110 THERAPEUTIC EXERCISES: CPT

## 2019-06-02 PROCEDURE — 83735 ASSAY OF MAGNESIUM: CPT | Mod: 91

## 2019-06-02 PROCEDURE — C9113 INJ PANTOPRAZOLE SODIUM, VIA: HCPCS | Performed by: NURSE PRACTITIONER

## 2019-06-02 PROCEDURE — 84100 ASSAY OF PHOSPHORUS: CPT

## 2019-06-02 PROCEDURE — 63600175 PHARM REV CODE 636 W HCPCS: Performed by: NURSE PRACTITIONER

## 2019-06-02 PROCEDURE — 80048 BASIC METABOLIC PNL TOTAL CA: CPT | Mod: 91

## 2019-06-02 PROCEDURE — 97116 GAIT TRAINING THERAPY: CPT

## 2019-06-02 PROCEDURE — 27000221 HC OXYGEN, UP TO 24 HOURS

## 2019-06-02 PROCEDURE — 83735 ASSAY OF MAGNESIUM: CPT

## 2019-06-02 PROCEDURE — 94799 UNLISTED PULMONARY SVC/PX: CPT

## 2019-06-02 PROCEDURE — 36415 COLL VENOUS BLD VENIPUNCTURE: CPT

## 2019-06-02 PROCEDURE — 21400001 HC TELEMETRY ROOM

## 2019-06-02 PROCEDURE — 85730 THROMBOPLASTIN TIME PARTIAL: CPT

## 2019-06-02 RX ORDER — POTASSIUM CHLORIDE 20 MEQ/1
20 TABLET, EXTENDED RELEASE ORAL ONCE
Status: COMPLETED | OUTPATIENT
Start: 2019-06-02 | End: 2019-06-02

## 2019-06-02 RX ORDER — FOLIC ACID 1 MG/1
1 TABLET ORAL DAILY
Status: DISCONTINUED | OUTPATIENT
Start: 2019-06-02 | End: 2019-06-05 | Stop reason: HOSPADM

## 2019-06-02 RX ORDER — METOLAZONE 2.5 MG/1
2.5 TABLET ORAL ONCE
Status: COMPLETED | OUTPATIENT
Start: 2019-06-02 | End: 2019-06-02

## 2019-06-02 RX ORDER — FERROUS SULFATE 325(65) MG
325 TABLET, DELAYED RELEASE (ENTERIC COATED) ORAL 2 TIMES DAILY
Status: DISCONTINUED | OUTPATIENT
Start: 2019-06-02 | End: 2019-06-05 | Stop reason: HOSPADM

## 2019-06-02 RX ORDER — LINEZOLID 600 MG/1
600 TABLET, FILM COATED ORAL EVERY 12 HOURS
Status: DISCONTINUED | OUTPATIENT
Start: 2019-06-02 | End: 2019-06-05 | Stop reason: HOSPADM

## 2019-06-02 RX ORDER — PANTOPRAZOLE SODIUM 40 MG/1
40 TABLET, DELAYED RELEASE ORAL DAILY
Status: DISCONTINUED | OUTPATIENT
Start: 2019-06-03 | End: 2019-06-05 | Stop reason: HOSPADM

## 2019-06-02 RX ORDER — LANOLIN ALCOHOL/MO/W.PET/CERES
1000 CREAM (GRAM) TOPICAL DAILY
Status: DISCONTINUED | OUTPATIENT
Start: 2019-06-02 | End: 2019-06-05 | Stop reason: HOSPADM

## 2019-06-02 RX ORDER — TRAMADOL HYDROCHLORIDE 50 MG/1
50 TABLET ORAL EVERY 8 HOURS PRN
Status: DISCONTINUED | OUTPATIENT
Start: 2019-06-02 | End: 2019-06-05 | Stop reason: HOSPADM

## 2019-06-02 RX ADMIN — FUROSEMIDE 40 MG: 10 INJECTION, SOLUTION INTRAMUSCULAR; INTRAVENOUS at 08:06

## 2019-06-02 RX ADMIN — FOLIC ACID 1 MG: 1 TABLET ORAL at 11:06

## 2019-06-02 RX ADMIN — FERROUS SULFATE TAB EC 325 MG (65 MG FE EQUIVALENT) 325 MG: 325 (65 FE) TABLET DELAYED RESPONSE at 08:06

## 2019-06-02 RX ADMIN — ASPIRIN 81 MG: 81 TABLET, COATED ORAL at 09:06

## 2019-06-02 RX ADMIN — OXYCODONE HYDROCHLORIDE 10 MG: 5 TABLET ORAL at 01:06

## 2019-06-02 RX ADMIN — OXYCODONE HYDROCHLORIDE 10 MG: 5 TABLET ORAL at 06:06

## 2019-06-02 RX ADMIN — CHLORHEXIDINE GLUCONATE 0.12% ORAL RINSE 10 ML: 1.2 LIQUID ORAL at 08:06

## 2019-06-02 RX ADMIN — METOPROLOL TARTRATE 25 MG: 25 TABLET ORAL at 08:06

## 2019-06-02 RX ADMIN — FUROSEMIDE 40 MG: 10 INJECTION, SOLUTION INTRAMUSCULAR; INTRAVENOUS at 09:06

## 2019-06-02 RX ADMIN — ALBUMIN (HUMAN) 250 ML: 12.5 SOLUTION INTRAVENOUS at 03:06

## 2019-06-02 RX ADMIN — PANTOPRAZOLE SODIUM 40 MG: 40 INJECTION, POWDER, FOR SOLUTION INTRAVENOUS at 09:06

## 2019-06-02 RX ADMIN — CHLORHEXIDINE GLUCONATE 0.12% ORAL RINSE 10 ML: 1.2 LIQUID ORAL at 09:06

## 2019-06-02 RX ADMIN — POLYETHYLENE GLYCOL 3350 17 G: 17 POWDER, FOR SOLUTION ORAL at 09:06

## 2019-06-02 RX ADMIN — FERROUS SULFATE TAB EC 325 MG (65 MG FE EQUIVALENT) 325 MG: 325 (65 FE) TABLET DELAYED RESPONSE at 11:06

## 2019-06-02 RX ADMIN — POTASSIUM CHLORIDE 20 MEQ: 1500 TABLET, EXTENDED RELEASE ORAL at 08:06

## 2019-06-02 RX ADMIN — LINEZOLID 600 MG: 600 TABLET, FILM COATED ORAL at 08:06

## 2019-06-02 RX ADMIN — ATORVASTATIN CALCIUM 80 MG: 40 TABLET, FILM COATED ORAL at 09:06

## 2019-06-02 RX ADMIN — CYANOCOBALAMIN TAB 1000 MCG 1000 MCG: 1000 TAB at 11:06

## 2019-06-02 RX ADMIN — METOLAZONE 2.5 MG: 2.5 TABLET ORAL at 11:06

## 2019-06-02 RX ADMIN — CLOPIDOGREL BISULFATE 75 MG: 75 TABLET ORAL at 09:06

## 2019-06-02 RX ADMIN — METOPROLOL TARTRATE 25 MG: 25 TABLET ORAL at 09:06

## 2019-06-02 RX ADMIN — AZTREONAM 1000 MG: 1 INJECTION, POWDER, LYOPHILIZED, FOR SOLUTION INTRAMUSCULAR; INTRAVENOUS at 05:06

## 2019-06-02 NOTE — ASSESSMENT & PLAN NOTE
-Likely reactive.   -Continue to monitor for signs or symptoms of infection.     06/01- will send serum procalcitonin in AM.  CBC showed- wbc -21.7  Will continue to monitor closely .  Chest x-ray showed-Mild atelectasis on the left.  No significant pulmonary vascular congestion or heart failure

## 2019-06-02 NOTE — ASSESSMENT & PLAN NOTE
-Likely reactive.   -Continue to monitor for signs or symptoms of infection.     06/01- will send serum procalcitonin in AM.  CBC showed- wbc -21.7  Will continue to monitor closely .  Chest x-ray showed-Mild atelectasis on the left.  No significant pulmonary vascular congestion or heart failure    06 /02- will follow serum procalcitonin , follow CBC in AM , discuss with CVT

## 2019-06-02 NOTE — SUBJECTIVE & OBJECTIVE
Interval History:   06/01- 73 year old man s/p -status-post coronary artery bypass grafting x 5. He was seen with wife in the room.  CBC showed-  Component      Latest Ref Rng & Units 6/1/2019 6/1/2019 6/1/2019 5/31/2019           6:10 PM  2:15 PM  3:45 AM  9:17 PM   WBC      3.90 - 12.70 K/uL 21.73 (H) 21.37 (H) 14.56 (H) 17.74 (H)       Review of Systems   Constitutional: Negative.  Negative for chills and fever.   HENT: Negative.  Negative for congestion and sore throat.    Eyes: Negative.  Negative for visual disturbance.   Respiratory: Negative.  Negative for cough, shortness of breath and wheezing.    Cardiovascular: Negative for chest pain.   Gastrointestinal: Negative for abdominal pain, diarrhea, nausea and vomiting.   Endocrine: Negative.    Genitourinary: Negative.    Musculoskeletal: Negative.  Negative for myalgias and neck stiffness.   Skin: Negative.  Negative for color change and pallor.   Allergic/Immunologic: Negative.    Neurological: Negative.    Hematological: Negative.    Psychiatric/Behavioral: Negative.    All other systems reviewed and are negative.    Objective:     Vital Signs (Most Recent):  Temp: 100.1 °F (37.8 °C) (06/01/19 1900)  Pulse: 91 (06/01/19 2215)  Resp: 20 (06/01/19 2215)  BP: 121/68 (06/01/19 2200)  SpO2: 99 % (06/01/19 2215) Vital Signs (24h Range):  Temp:  [99.2 °F (37.3 °C)-100.4 °F (38 °C)] 100.1 °F (37.8 °C)  Pulse:  [] 91  Resp:  [19-40] 20  SpO2:  [90 %-100 %] 99 %  BP: ()/(39-68) 121/68     Weight: 80.2 kg (176 lb 12.9 oz)  Body mass index is 27.69 kg/m².    Intake/Output Summary (Last 24 hours) at 6/1/2019 2314  Last data filed at 6/1/2019 2200  Gross per 24 hour   Intake 3182.34 ml   Output 2656 ml   Net 526.34 ml      Physical Exam   Constitutional: He is oriented to person, place, and time. He appears well-developed and well-nourished. No distress.   HENT:   Head: Normocephalic and atraumatic.   Eyes: Pupils are equal, round, and reactive to light. EOM  are normal.   Neck: Normal range of motion. Neck supple. No JVD present.   Cardiovascular: Normal rate, regular rhythm, normal heart sounds and intact distal pulses. Exam reveals no gallop and no friction rub.   No murmur heard.  Paced AAI. Hemodynamically stable. Mediastinal chest tubes in place x 2.    Pulmonary/Chest: Effort normal. No stridor. No respiratory distress. He has no wheezes. He has no rales. He exhibits tenderness.    TOD pleural chest tubes in place.     Abdominal: Soft. Bowel sounds are normal. He exhibits no distension. There is no tenderness. There is no guarding.   Musculoskeletal: Normal range of motion. He exhibits edema. He exhibits no tenderness or deformity.   1+ pitting edema to TOD LE, s/p EVH to TOD LE.    Neurological: He is alert and oriented to person, place, and time. He displays normal reflexes. No cranial nerve deficit or sensory deficit. He exhibits normal muscle tone. Coordination normal.   Skin: Skin is warm and dry. Capillary refill takes less than 2 seconds. No rash noted. He is not diaphoretic. No erythema. No pallor.   Psychiatric: He has a normal mood and affect. His behavior is normal. Judgment and thought content normal.   Nursing note and vitals reviewed.      Significant Labs:   BMP:   Recent Labs   Lab 06/01/19  1810   *      K 5.0      CO2 26   BUN 21   CREATININE 1.1   CALCIUM 9.4   MG 2.6     CBC:   Recent Labs   Lab 06/01/19  0345 06/01/19  1415 06/01/19  1810   WBC 14.56* 21.37* 21.73*   HGB 7.3* 10.4* 10.3*   HCT 20.0* 29.4* 29.2*   * 112* 113*     All pertinent labs within the past 24 hours have been reviewed.    Significant Imaging: I have reviewed and interpreted all pertinent imaging results/findings within the past 24 hours.

## 2019-06-02 NOTE — ASSESSMENT & PLAN NOTE
Continue ASA, heparin, metoprolol and Lipitor.  Echocardiogram and lipid panel pending.  Patient was evaluated for Cardiac Rehab and is not a candidate at this time.  LHC revealed multi-vessel disease.  Dr. Dorado performed 5 vessel CABG on 31 May.    06/01-will continue cardiology follow up, on lipitor, metoprolol, monitor closely.

## 2019-06-02 NOTE — ASSESSMENT & PLAN NOTE
06/01- hemoglobin is 10.3, will monitor closely and transfuse as needed    06/02- will continue close monitoring of serum hemoglobin , hemoglobin is 9.7.

## 2019-06-02 NOTE — ASSESSMENT & PLAN NOTE
Insulin ASPART - MODERATE CORRECTION DOSE. Stable EUGLYCEMIA . Blood glucose target 100 - 180 mg/dl

## 2019-06-02 NOTE — PROGRESS NOTES
[facility] -   Cardiothoracic Surgery  Progress Note    Patient Name: Chapin Calderon  MRN: [id number]  Admission Date: 5/29/2019  Hospital Length of Stay: 4 days  Code Status: Full Code   Attending Physician: Mani Doardo MD   Referring Provider: Self, Aaareferral  Principal Problem:NSTEMI (non-ST elevated myocardial infarction)            Subjective:     Post-Op Info:  Procedure(s) (LRB):  CORONARY ARTERY BYPASS GRAFT (CABG) (N/A)  ECHOCARDIOGRAM,TRANSESOPHAGEAL (N/A)  SURGICAL PROCUREMENT, VEIN, ENDOSCOPIC (Bilateral)   2 Days Post-Op     Interval History:  06/02/2019  The patient is post-operative day 2, status-post coronary artery bypass grafting x 5.     ROS  Medications:  Continuous Infusions:  Scheduled Meds:   aspirin  81 mg Oral Daily    atorvastatin  80 mg Oral Daily    chlorhexidine  10 mL Mouth/Throat BID    clopidogrel  75 mg Oral Daily    cyanocobalamin  1,000 mcg Oral Daily    ferrous sulfate  325 mg Oral BID    folic acid  1 mg Oral Daily    furosemide  40 mg Intravenous BID    metOLazone  2.5 mg Oral Once    metoprolol tartrate  25 mg Oral BID    nozaseptin   Each Nare BID    pantoprazole  40 mg Intravenous Daily    polyethylene glycol  17 g Oral Daily     PRN Meds:sodium chloride, sodium chloride, acetaminophen, albumin human 5%, ceFAZolin (ANCEF) IVPB, chlorhexidine, dextrose 50%, dextrose 50%, glucagon (human recombinant), glucose, glucose, insulin aspart U-100, lactated ringers, magnesium sulfate IVPB, metoclopramide HCl, metoprolol tartrate, nozaseptin, ondansetron, ondansetron, potassium chloride in water **AND** potassium chloride in water **AND** potassium chloride in water, traMADol     Objective:     Vital Signs (Most Recent):  Temp: 98.9 °F (37.2 °C) (06/02/19 0730)  Pulse: 91 (06/02/19 0845)  Resp: (!) 30 (06/02/19 0845)  BP: (!) 112/55 (06/02/19 0800)  SpO2: 96 % (06/02/19 0735) Vital Signs (24h Range):  Temp:  [98.8 °F (37.1 °C)-100.1 °F (37.8 °C)] 98.9 °F  (37.2 °C)  Pulse:  [90-92] 91  Resp:  [17-36] 30  SpO2:  [94 %-100 %] 96 %  BP: ()/(54-68) 112/55     Weight: 78.9 kg (173 lb 15.1 oz)  Body mass index is 27.24 kg/m².    SpO2: 96 %  O2 Device (Oxygen Therapy): nasal cannula    Intake/Output - Last 3 Shifts       05/31 0700 - 06/01 0659 06/01 0700 - 06/02 0659 06/02 0700 - 06/03 0659    P.O.  1400     I.V. (mL/kg) 3907.8 (48.7) 60.3 (0.8)     Blood 350 825     IV Piggyback 300 100     Total Intake(mL/kg) 4557.8 (56.8) 2385.3 (30.2)     Urine (mL/kg/hr) 1301 (0.7) 2112 (1.1)     Drains 105 15     Stool 0 0 0    Chest Tube 730 650 70    Total Output 2136 2777 70    Net +2421.8 -391.7 -70           Stool Occurrence 0 x 0 x 0 x          Lines/Drains/Airways     Drain                 Urethral Catheter 05/31/19 0725 Latex;Straight-tip;Temperature probe 16 Fr. 2 days         Y Chest Tube 1 and 2 05/31/19 1302 1 Right Mediastinal 24 Fr. 2 Left Mediastinal 24 Fr. 1 day         Y Chest Tube 3 and 4 05/31/19 1303 3 Right Pleural 24 Fr. Left Pleural 24 Fr. 1 day          Line                 Pacer Wires 05/31/19 1530 1 day          Peripheral Intravenous Line                 Peripheral IV - Single Lumen 05/29/19 0820 20 G Left Forearm 4 days         Midline Catheter Insertion/Assessment  - Single Lumen 06/01/19 0920 Left brachial vein 18g x 10cm 1 day                Physical Exam   Constitutional: He is oriented to person, place, and time. He appears well-developed and well-nourished. No distress.   HENT:   Head: Normocephalic and atraumatic.   Eyes: Pupils are equal, round, and reactive to light. EOM are normal.   Neck: Normal range of motion. Neck supple. No JVD present.   Cardiovascular: Normal rate, regular rhythm, normal heart sounds and intact distal pulses. Exam reveals no gallop and no friction rub.   No murmur heard.  Hemodynamically stable. NSR paced AAI. Mediastinal chest tubes in place x 2.    Pulmonary/Chest: Effort normal and breath sounds normal. No  stridor. No respiratory distress. He has no wheezes. He has no rales. He exhibits tenderness.   TOD pleural chest tubes in place.    Abdominal: Soft. Bowel sounds are normal. He exhibits no distension. There is no tenderness. There is no guarding.   Musculoskeletal: Normal range of motion. He exhibits edema. He exhibits no tenderness or deformity.   1+ pitting edema to the TOD LE. S/P EVH from TOD LE.    Neurological: He is alert and oriented to person, place, and time. He displays normal reflexes. No cranial nerve deficit or sensory deficit. He exhibits normal muscle tone. Coordination normal.   Skin: Skin is warm and dry. Capillary refill takes less than 2 seconds. No rash noted. He is not diaphoretic. No erythema. No pallor.   Psychiatric: He has a normal mood and affect. His behavior is normal. Judgment and thought content normal.   Nursing note and vitals reviewed.      Significant Labs:  BMP:   Recent Labs   Lab 06/02/19  0405   *   *   K 5.0   CL 98   CO2 31*   BUN 25*   CREATININE 1.0   CALCIUM 8.9   MG 2.4     CBC:   Recent Labs   Lab 06/02/19  0405   WBC 22.28*   RBC 3.26*   HGB 9.7*   HCT 27.8*   *   MCV 85   MCH 29.8   MCHC 34.9       Significant Diagnostics:  I have reviewed and interpreted all pertinent imaging results/findings within the past 24 hours.    Assessment/Plan:     S/P CABG x 5       06/01/2019  The patient is post-operative day 1, status-post coronary artery bypass grafting x 5.  Overall the patient is doing well.    Neuro:  The patient is awake alert and oriented x3.  Neuro exam is nonfocal.  Pain is well controlled. D/C fentanyl.   Cardiac:  Patient is off all gtts.  Excellent cardiac index.  Patient will be started on metoprolol.   Respiratory: Good sats on nasal cannula.  Continue respiratory toilet, continue incentive spirometer.  GI:  Advance diet as tolerated. Cardiac diet.    Renal:  Good urine output. Cr 1.1. K 3.8. K replaced. Mag 2.6.  Heme:  Hct 20.  Plt  124. Administer 2 units PRBC. Continue Aspirin and Plavix.   Id:  White count is 14. Tmax 100.8  Suspect secondary to stress of surgery. Will continue to follow.  Endocrine:  Glucose is controlled with insulin gtt.  Discontinue insulin gtt. Start sliding scale insulin.   Activities:  Patient is out of bed to chair.  Advance activities as tolerated.  Lines tubes and drains:  Discontinue [place] and [place].  Discontinue A-line. Discontinue cordero catheter. Discontinue AV x2. Continue chest tubes x 4 for now.  Continue pacer wires. Start midline.          06/02/2019  The patient is post-operative day 2, status-post coronary artery bypass grafting x 5.  Overall the patient is doing well. Anticipate transfer to telemetry today.    Neuro:  The patient is awake alert and oriented x3.  Neuro exam is nonfocal.  Pain is well controlled. D/C oxycodone. Start tramadol.   Cardiac: Hemodynamically stable. Continue metoprolol at current dose.   Respiratory: Good sats on nasal cannula.  Continue respiratory toilet, continue incentive spirometer. Wean to room air.   GI:  Advance diet as tolerated. Cardiac diet.    Renal:  Good urine output. Cr 1.0. K 5.0. Hold K today. Mag 2.4. Once dose of Metolazone. Repeat BMP at 1300.    Heme:  Hct 27.8.  Plt 114. Continue Aspirin and Plavix.   Id:  White count is 22. Tmax 100.4  Suspect secondary to stress of surgery. Will continue to follow.  Endocrine:  Glucose is well controlled with sliding scale insulin.   Activities:  Patient is out of bed to chair.  Advance activities as tolerated.  Lines tubes and drains: Discontinue chest tubes x4. Discontinue cordero catheter. Continue pacer wires. Continue midline.  Plan: Anticipate transfer to telemetry today.        Coronary artery disease involving native coronary artery of native heart with angina pectoris  05/29/2019  The patient is a 73 year old male with 80% proximal LAD stenosis that was worked up for NSTEMI. The patient has a history of prostate  cancer. The patient with 80% proximal LAD stenosis and multivessel coronary artery disease is a candidate for urgent coronary artery bypass grafting. The risks and benefits of surgery have been explained to the patient, his wife, and his adult daughter, in great detail. All questions have been answered to the patient's satisfaction. The patient has agreed to go ahead with coronary artery bypass grafting, which will be scheduled for 05/31/2019.         Duane Carpenter NP  Cardiothoracic Surgery  [facility]

## 2019-06-02 NOTE — PROGRESS NOTES
[facility] Hospital Medicine  Progress Note    Patient Name: Chapin Calderon  MRN: [id number]  Patient Class: IP- Inpatient   Admission Date: 5/29/2019  Length of Stay: 4 days  Attending Physician: Mani Dorado MD  Primary Care Provider: Tiffanie Vicente MD        Subjective:     Principal Problem:NSTEMI (non-ST elevated myocardial infarction)    HPI:  Chapin Calderon is a 73 year old male with a past medical history of prostate cancer who presented to the ED with complaints of chest pain that began one day prior to presentation. The pain is described as a constant pressure in his left chest that rates a 6/10 on a pain scale. The patient denies radiation and associated symptoms including SOB, nausea, vomiting, diaphoresis and lower extremity edema. He tried Tums with no relieved. However, symptoms resolved shortly after receiving an Asprin in the ED. In the ED, the patient's initial troponin was 6.324 with an increase to 7.465. Code status was discussed with the patient. He is a full code. His wife, Daniela Calderon, is his surrogate medical decision maker.      Hospital Course:  C revealed multi-vessel disease.  Evaluation by CV Surgery with a recommendation for CABG.  Successful CABG 31 May.  Post-op in ICU.    06/01- 73 year old man s/p -status-post coronary artery bypass grafting x 5.  He was seen with wife in the room.  CBC showed-  Component      Latest Ref Rng & Units 6/1/2019 6/1/2019 6/1/2019 5/31/2019           6:10 PM  2:15 PM  3:45 AM  9:17 PM   WBC      3.90 - 12.70 K/uL 21.73 (H) 21.37 (H) 14.56 (H) 17.74 (H)      06/02-  73 year old man s/p CABG X5 , he denies fever or chest pain.  Wbc is 9.7    Interval History:  06/02-  73 year old man s/p CABG X5 , he denies fever or chest pain.  Wbc is 9.7      Review of Systems   Constitutional: Negative.  Negative for chills and fever.   HENT: Negative.  Negative for congestion and sore throat.    Eyes: Negative.  Negative for visual  disturbance.   Respiratory: Negative.  Negative for cough, shortness of breath and wheezing.    Cardiovascular: Negative for chest pain.   Gastrointestinal: Negative for abdominal pain, diarrhea, nausea and vomiting.   Endocrine: Negative.    Genitourinary: Negative.    Musculoskeletal: Negative.  Negative for myalgias and neck stiffness.   Skin: Negative.  Negative for color change and pallor.   Allergic/Immunologic: Negative.    Neurological: Negative.    Hematological: Negative.    Psychiatric/Behavioral: Negative.    All other systems reviewed and are negative.    Objective:     Vital Signs (Most Recent):  Temp: 98.2 °F (36.8 °C) (06/02/19 1105)  Pulse: 91 (06/02/19 1200)  Resp: (!) 22 (06/02/19 1200)  BP: (!) 94/52 (06/02/19 1200)  SpO2: 95 % (06/02/19 1200) Vital Signs (24h Range):  Temp:  [98.2 °F (36.8 °C)-100.1 °F (37.8 °C)] 98.2 °F (36.8 °C)  Pulse:  [90-91] 91  Resp:  [16-36] 22  SpO2:  [95 %-100 %] 95 %  BP: ()/(51-68) 94/52     Weight: 78.9 kg (173 lb 15.1 oz)  Body mass index is 27.24 kg/m².    Intake/Output Summary (Last 24 hours) at 6/2/2019 1250  Last data filed at 6/2/2019 0900  Gross per 24 hour   Intake 500 ml   Output 1832 ml   Net -1332 ml      Physical Exam   Constitutional: He is oriented to person, place, and time. He appears well-developed and well-nourished. No distress.   HENT:   Head: Normocephalic and atraumatic.   Eyes: Pupils are equal, round, and reactive to light. EOM are normal.   Neck: Normal range of motion. Neck supple. No JVD present.   Cardiovascular: Normal rate, regular rhythm, normal heart sounds and intact distal pulses. Exam reveals no gallop and no friction rub.   No murmur heard.  Paced AAI. Hemodynamically stable. Mediastinal chest tubes in place x 2.    Pulmonary/Chest: Effort normal. No stridor. No respiratory distress. He has no wheezes. He has no rales. He exhibits tenderness.    TOD pleural chest tubes in place.     Abdominal: Soft. Bowel sounds are normal.  He exhibits no distension. There is no tenderness. There is no guarding.   Musculoskeletal: Normal range of motion. He exhibits edema. He exhibits no tenderness or deformity.   1+ pitting edema to TOD LE, s/p EVH to TOD LE.    Neurological: He is alert and oriented to person, place, and time. He displays normal reflexes. No cranial nerve deficit or sensory deficit. He exhibits normal muscle tone. Coordination normal.   Skin: Skin is warm and dry. Capillary refill takes less than 2 seconds. No rash noted. He is not diaphoretic. No erythema. No pallor.   Psychiatric: He has a normal mood and affect. His behavior is normal. Judgment and thought content normal.   Nursing note and vitals reviewed.      Significant Labs:   CBC:   Recent Labs   Lab 06/01/19  1415 06/01/19  1810 06/02/19  0405   WBC 21.37* 21.73* 22.28*   HGB 10.4* 10.3* 9.7*   HCT 29.4* 29.2* 27.8*   * 113* 114*     All pertinent labs within the past 24 hours have been reviewed.    Significant Imaging: I have reviewed and interpreted all pertinent imaging results/findings within the past 24 hours.    Assessment/Plan:      Anemia following surgery    06/01- hemoglobin is 10.3, will monitor closely and transfuse as needed    06/02- will continue close monitoring of serum hemoglobin , hemoglobin is 9.7.       S/P CABG x 5    06.01- CVT follow up, continue supportive care     Leukocytosis  -Likely reactive.   -Continue to monitor for signs or symptoms of infection.     06/01- will send serum procalcitonin in AM.  CBC showed- wbc -21.7  Will continue to monitor closely .  Chest x-ray showed-Mild atelectasis on the left.  No significant pulmonary vascular congestion or heart failure    06 /02- will follow serum procalcitonin , follow CBC in AM , discuss with CVT      VTE Risk Mitigation (From admission, onward)        Ordered     IP VTE LOW RISK PATIENT  Once      05/30/19 0752     Place SKY hose  Until discontinued      05/30/19 0752     Place sequential  compression device  Until discontinued      05/30/19 0752     heparin 25,000 units in dextrose 5% 250 mL (100 units/mL) infusion LOW INTENSITY nomogram - [facility]  Continuous      05/29/19 2001     Reason for no Mechanical VTE Prophylaxis  Once      05/29/19 1431          Critical care time spent on the evaluation and treatment of severe organ dysfunction, review of pertinent labs and imaging studies, discussions with consulting providers and discussions with patient/family:  45 minutes.    Jose Eduardo Cantu MD  Department of Hospital Medicine   [facility]

## 2019-06-02 NOTE — PLAN OF CARE
"Problem: Occupational Therapy Goal  Goal: Occupational Therapy Goal  Goals to be met by: 6/8/19     Patient will increase functional independence with ADLs by performing:    UE Dressing with Minimal Assistance.  LE Dressing with Moderate Assistance.  Grooming while standing at sink with Contact Guard Assistance.  Toileting from toilet with Moderate Assistance for hygiene and clothing management.   Toilet transfer to toilet with Minimal Assistance.     Outcome: Ongoing (interventions implemented as appropriate)  PT WITH LIMITED PARTICIPATED ONLY DUE TO CHEST TUBES AND CATHETER STILL REMAINING. HE DID STAND WITH CGA/MIN A FROM BEDSIDE CHAIR AND COMPLETED MARCHING IN PLACE. PT WITH GOOD SAFETY AWARENESS REGARDING DECENT BACK INTO CHAIR. HE DECLINED FURTHER ACTIVITY, AS HE SAW CARDIOLOGIST OUTSIDE OF ROOM AND DID NOT WANT TO "TAKE TIME AWAY FROM THE DOCTOR." PT LEFT WITH ALL LINES INTACT WITH STAFF AND FAMILY PRESENT. FOLLOWED STERNAL PRECAUTIONS WITHOUT CUES.      "

## 2019-06-02 NOTE — PT/OT/SLP PROGRESS
Physical Therapy  Treatment    Chapin Calderon   MRN: [id number]   Admitting Diagnosis: NSTEMI (non-ST elevated myocardial infarction)    PT Received On: 05/31/19  PT Start Time: 1110     PT Stop Time: 1135    PT Total Time (min): 25 min       Billable Minutes:  Gait Training 15 and Therapeutic Activity 10    Treatment Type: Treatment  PT/PTA: PT             General Precautions: Standard, fall, sternal  Orthopedic Precautions: N/A   Braces: N/A         Subjective:  Communicated with RN Juan/El prior to session.      Pain/Comfort  Pain Rating 1: 0/10    Objective:   Patient found with: blood pressure cuff, telemetry, oxygen, pulse ox (continuous), peripheral IV    Functional Mobility:  Bed Mobility: nt - up in chair       Transfers: sit to stand cga from bedside chair       Gait: Amb 40ft no AD but c/o dizziness r/t low bp and had to return to bedside chair       Stairs: nt      Balance:   Static Sit: sba  Dynamic Sit: sba  Static Stand: sba  Dynamic stand: min/cga     Therapeutic Activities and Exercises:  PT reviewed POC, safety/fall/sternal precautions with mobility, le exs to do in the chair, breathing exs and role of PT     AM-PAC 6 CLICK MOBILITY  How much help from another person does this patient currently need?   1 = Unable, Total/Dependent Assistance  2 = A lot, Maximum/Moderate Assistance  3 = A little, Minimum/Contact Guard/Supervision  4 = None, Modified [place]/Independent    Turning over in bed (including adjusting bedclothes, sheets and blankets)?: 1(did not test)  Sitting down on and standing up from a chair with arms (e.g., wheelchair, bedside commode, etc.): 3  Moving from lying on back to sitting on the side of the bed?: 1  Moving to and from a bed to a chair (including a wheelchair)?: 3  Need to walk in hospital room?: 3  Climbing 3-5 steps with a railing?: 1  Basic Mobility Total Score: 12    AM-PAC Raw Score CMS G-Code Modifier Level of Impairment Assistance   6 % Total / Unable    7 - 9 CM 80 - 100% Maximal Assist   10 - 14 CL 60 - 80% Moderate Assist   15 - 19 CK 40 - 60% Moderate Assist   20 - 22 CJ 20 - 40% Minimal Assist   23 CI 1-20% SBA / CGA   24 CH 0% Independent/ Mod I     Patient left up in chair with all lines intact, call button in reach, RN notified and RN present.    Assessment:  Chapin Calderon is a 73 y.o. male with a medical diagnosis of NSTEMI (non-ST elevated myocardial infarction) and presents with impaired mobility.    Rehab identified problem list/impairments: Rehab identified problem list/impairments: impaired functional mobilty, gait instability, weakness, impaired endurance, impaired balance, impaired self care skills, impaired cardiopulmonary response to activity, decreased safety awareness    Rehab potential is good.    Activity tolerance: Good    Discharge recommendations: Discharge Facility/Level of Care Needs: home health PT     Barriers to discharge:      Equipment recommendations: Equipment Needed After Discharge: shower chair     GOALS:   Multidisciplinary Problems     Physical Therapy Goals        Problem: Physical Therapy Goal    Goal Priority Disciplines Outcome Goal Variances Interventions   Physical Therapy Goal     PT, PT/OT Ongoing (interventions implemented as appropriate)     Description:  1. Patient will perform sup to/from sit cga  2. Patient will perform sit to/from stand sba to prep amb  3. Patient will amb >300ft sba no gross LOB                    PLAN:    Patient to be seen 5 x/week  to address the above listed problems via gait training, therapeutic activities, therapeutic exercises  Plan of Care expires: 06/08/19  Plan of Care reviewed with: patient    PT G-Codes  Functional Assessment Tool Used: [facility]  Score: 12    Mckay Cooper, PT  06/02/2019

## 2019-06-02 NOTE — PLAN OF CARE
Problem: Adult Inpatient Plan of Care  Goal: Plan of Care Review  Outcome: Ongoing (interventions implemented as appropriate)  No acute events overnight.  Pt bathed and up to chair in the am.  Labs indicate no derangements.

## 2019-06-02 NOTE — PROGRESS NOTES
[facility] -   Cardiology  Progress Note    Patient Name: Chapin Calderon  MRN: [id number]  Admission Date: 5/29/2019  Hospital Length of Stay: 4 days  Code Status: Full Code   Attending Physician: Mani Dorado MD   Primary Care Physician: Tiffanie Vicente MD  Expected Discharge Date: 6/7/2019  Principal Problem:NSTEMI (non-ST elevated myocardial infarction)    Subjective:     HPI:  Pt c/o chest pain, mid chest, all day yesterday which prompted him to go to ER overnight.  Cp much improved now, seems mostly resolved.  ecg shows NSR, low voltage, possible septal infarct.  Troponin 6.2 on initial labs.   .  No prior h/o CAD.  Active male with no significant health issues otherwise per pt.    Hospital Course:   5/30/19--Patient seen and examined in room, lying in bed. Denies chest pain or SOB today. IV heparin gtt in place. Plans for CABG tomorrow per CT surgery. Labs reviewed, K+ 4.5, Cr 0.8, Mag 1.9.     5/31/19--Patient seen and examined in ICU, intubated and sedated post CABG. CABG x 5V per Dr. Dorado today. Labs reviewed, INR 1.2, K+ 4.2, Cr 1.0, Mag 3.8, H/H 9/25.1.     6/1/19:  PT SEEN AND EXAMINED IN ICU.  EXTUBATED.  NO ANGINA.  USUAL POST OP DISCOMFORT.  NO DYSPNEA.  LABS SHOWS SEVERE POST OP ANEMIA.  RECEIVING PRBC THIS AM.    6/2/19:  PT SEEN AND EXAMINED IN ICU.  NO ACUTE CV ISSUES NOTED.  CHEST TUBES BEING PULLED THIS AM.  HG IMPROVED. NO ACTIVE ANGINA OR CHF SXS. APPROPRIATE POST OP PAIN.        Review of Systems   Constitution: Positive for malaise/fatigue.   HENT: Negative.    Eyes: Negative.    Cardiovascular: Negative.    Respiratory: Negative.    Endocrine: Negative.    Hematologic/Lymphatic: Negative.    Skin: Negative.    Musculoskeletal: Negative.    Gastrointestinal: Negative.    Genitourinary: Negative.    Neurological: Positive for weakness.   Psychiatric/Behavioral: Negative.    Allergic/Immunologic: Negative.      Objective:     Vital Signs (Most Recent):  Temp: 98.9 °F  (37.2 °C) (06/02/19 0730)  Pulse: 91 (06/02/19 0845)  Resp: (!) 30 (06/02/19 0845)  BP: (!) 112/55 (06/02/19 0800)  SpO2: 98 % (06/02/19 1153) Vital Signs (24h Range):  Temp:  [98.8 °F (37.1 °C)-100.1 °F (37.8 °C)] 98.9 °F (37.2 °C)  Pulse:  [90-91] 91  Resp:  [17-36] 30  SpO2:  [96 %-100 %] 98 %  BP: ()/(54-68) 112/55     Weight: 78.9 kg (173 lb 15.1 oz)  Body mass index is 27.24 kg/m².     SpO2: 98 %  O2 Device (Oxygen Therapy): nasal cannula      Intake/Output Summary (Last 24 hours) at 6/2/2019 1200  Last data filed at 6/2/2019 0900  Gross per 24 hour   Intake 500 ml   Output 1832 ml   Net -1332 ml       Lines/Drains/Airways     Drain                 Urethral Catheter 05/31/19 0725 Latex;Straight-tip;Temperature probe 16 Fr. 2 days          Line                 Pacer Wires 05/31/19 1530 1 day          Peripheral Intravenous Line                 Peripheral IV - Single Lumen 05/29/19 0820 20 G Left Forearm 4 days                Physical Exam   Constitutional: He is oriented to person, place, and time. He appears well-developed and well-nourished. He is cooperative.   HENT:   Head: Normocephalic.   Neck: Normal range of motion. Neck supple. Normal carotid pulses and no JVD present. Carotid bruit is not present.   Cardiovascular: Normal rate, regular rhythm, S1 normal and S2 normal. PMI is not displaced. Exam reveals no S3, no S4, no distant heart sounds, no friction rub, no midsystolic click and no opening snap.   No murmur heard.  Pulses:       Radial pulses are 2+ on the right side, and 2+ on the left side.   Pulmonary/Chest: Effort normal and breath sounds normal. He has no wheezes. He has no rales.   Abdominal: Soft. Bowel sounds are normal. He exhibits no distension, no abdominal bruit, no ascites and no mass. There is no tenderness.   Musculoskeletal: He exhibits no edema.   Neurological: He is oriented to person, place, and time.   Skin: Skin is warm.   Psychiatric: He has a normal mood and affect. His  behavior is normal.   Nursing note and vitals reviewed.      Significant Labs:   BMP:   Recent Labs   Lab 06/01/19  1449 06/01/19  1810 06/02/19  0405   * 143* 128*    136 135*   K 4.4 5.0 5.0   CL 99 100 98   CO2 28 26 31*   BUN 20 21 25*   CREATININE 1.1 1.1 1.0   CALCIUM 9.8 9.4 8.9   MG 2.3 2.6 2.4   , CMP   Recent Labs   Lab 06/01/19  1449 06/01/19  1810 06/02/19  0405    136 135*   K 4.4 5.0 5.0   CL 99 100 98   CO2 28 26 31*   * 143* 128*   BUN 20 21 25*   CREATININE 1.1 1.1 1.0   CALCIUM 9.8 9.4 8.9   ANIONGAP 11 10 6*   ESTGFRAFRICA >60 >60 >60   EGFRNONAA >60 >60 >60   , CBC   Recent Labs   Lab 06/01/19  1415 06/01/19  1810 06/02/19  0405   WBC 21.37* 21.73* 22.28*   HGB 10.4* 10.3* 9.7*   HCT 29.4* 29.2* 27.8*   * 113* 114*   , INR   Recent Labs   Lab 05/31/19  1356 05/31/19  1456 06/01/19  0345   INR 1.6* 1.2 1.1    and Troponin No results for input(s): TROPONINI in the last 48 hours.    Significant Imaging: Echocardiogram:   2D echo with color flow doppler:   Results for orders placed or performed during the hospital encounter of 05/29/19   2D echo with color flow doppler   Result Value Ref Range    QEF 60 55 - 65    Diastolic Dysfunction No     Est. PA Systolic Pressure 30.46      Assessment and Plan:     S/P NSTEMI WITH CATH SHOWING SEVERE MULTIVESSEL CAD AND NORMAL LVEF.  S/P CABG.  CONTINUE POST OP CARE/MGT.  CHEST TUBES OUT TODAY.  S/P PRBC, WITH IMPROVEMENT IN POST OP ANEMIA.  CONTINUE OMT FOR CAD.  INCENTIVE SPIROMETRY.  WILL FOLLOW.    Anemia following surgery  POST OP RELATED ANEMIA.  PRBC TRANSFUSION 6/1/19.    CAD, multiple vessel  See management plan detailed above.     S/P CABG x 5  CABG x 5V per CT surgery  Intubated, sedated in ICU today post surgery  Mgmt per CT surgery  Continue ASA, Statin, BB  Resume ARB once OK with CT surgery  Will continue to follow along    Coronary artery disease involving native coronary artery of native heart with angina  pectoris  See plan for s/p CABG    AP (angina pectoris)  See management plan detailed above.     Abnormal ECG  See management plan detailed above.       VTE Risk Mitigation (From admission, onward)        Ordered     IP VTE LOW RISK PATIENT  Once      05/30/19 0752     Place SKY hose  Until discontinued      05/30/19 0752     Place sequential compression device  Until discontinued      05/30/19 0752     heparin 25,000 units in dextrose 5% 250 mL (100 units/mL) infusion LOW INTENSITY nomogram - [facility]  Continuous      05/29/19 2001     Reason for no Mechanical VTE Prophylaxis  Once      05/29/19 1431          Reid Chase MD  Cardiology  [facility] -

## 2019-06-02 NOTE — SUBJECTIVE & OBJECTIVE
Review of Systems   Constitution: Positive for malaise/fatigue.   HENT: Negative.    Eyes: Negative.    Cardiovascular: Negative.    Respiratory: Negative.    Endocrine: Negative.    Hematologic/Lymphatic: Negative.    Skin: Negative.    Musculoskeletal: Negative.    Gastrointestinal: Negative.    Genitourinary: Negative.    Neurological: Positive for weakness.   Psychiatric/Behavioral: Negative.    Allergic/Immunologic: Negative.      Objective:     Vital Signs (Most Recent):  Temp: 98.9 °F (37.2 °C) (06/02/19 0730)  Pulse: 91 (06/02/19 0845)  Resp: (!) 30 (06/02/19 0845)  BP: (!) 112/55 (06/02/19 0800)  SpO2: 98 % (06/02/19 1153) Vital Signs (24h Range):  Temp:  [98.8 °F (37.1 °C)-100.1 °F (37.8 °C)] 98.9 °F (37.2 °C)  Pulse:  [90-91] 91  Resp:  [17-36] 30  SpO2:  [96 %-100 %] 98 %  BP: ()/(54-68) 112/55     Weight: 78.9 kg (173 lb 15.1 oz)  Body mass index is 27.24 kg/m².     SpO2: 98 %  O2 Device (Oxygen Therapy): nasal cannula      Intake/Output Summary (Last 24 hours) at 6/2/2019 1200  Last data filed at 6/2/2019 0900  Gross per 24 hour   Intake 500 ml   Output 1832 ml   Net -1332 ml       Lines/Drains/Airways     Drain                 Urethral Catheter 05/31/19 0725 Latex;Straight-tip;Temperature probe 16 Fr. 2 days          Line                 Pacer Wires 05/31/19 1530 1 day          Peripheral Intravenous Line                 Peripheral IV - Single Lumen 05/29/19 0820 20 G Left Forearm 4 days                Physical Exam   Constitutional: He is oriented to person, place, and time. He appears well-developed and well-nourished. He is cooperative.   HENT:   Head: Normocephalic.   Neck: Normal range of motion. Neck supple. Normal carotid pulses and no JVD present. Carotid bruit is not present.   Cardiovascular: Normal rate, regular rhythm, S1 normal and S2 normal. PMI is not displaced. Exam reveals no S3, no S4, no distant heart sounds, no friction rub, no midsystolic click and no opening snap.   No  murmur heard.  Pulses:       Radial pulses are 2+ on the right side, and 2+ on the left side.   Pulmonary/Chest: Effort normal and breath sounds normal. He has no wheezes. He has no rales.   Abdominal: Soft. Bowel sounds are normal. He exhibits no distension, no abdominal bruit, no ascites and no mass. There is no tenderness.   Musculoskeletal: He exhibits no edema.   Neurological: He is oriented to person, place, and time.   Skin: Skin is warm.   Psychiatric: He has a normal mood and affect. His behavior is normal.   Nursing note and vitals reviewed.      Significant Labs:   BMP:   Recent Labs   Lab 06/01/19  1449 06/01/19  1810 06/02/19  0405   * 143* 128*    136 135*   K 4.4 5.0 5.0   CL 99 100 98   CO2 28 26 31*   BUN 20 21 25*   CREATININE 1.1 1.1 1.0   CALCIUM 9.8 9.4 8.9   MG 2.3 2.6 2.4   , CMP   Recent Labs   Lab 06/01/19  1449 06/01/19  1810 06/02/19  0405    136 135*   K 4.4 5.0 5.0   CL 99 100 98   CO2 28 26 31*   * 143* 128*   BUN 20 21 25*   CREATININE 1.1 1.1 1.0   CALCIUM 9.8 9.4 8.9   ANIONGAP 11 10 6*   ESTGFRAFRICA >60 >60 >60   EGFRNONAA >60 >60 >60   , CBC   Recent Labs   Lab 06/01/19  1415 06/01/19  1810 06/02/19  0405   WBC 21.37* 21.73* 22.28*   HGB 10.4* 10.3* 9.7*   HCT 29.4* 29.2* 27.8*   * 113* 114*   , INR   Recent Labs   Lab 05/31/19  1356 05/31/19  1456 06/01/19  0345   INR 1.6* 1.2 1.1    and Troponin No results for input(s): TROPONINI in the last 48 hours.    Significant Imaging: Echocardiogram:   2D echo with color flow doppler:   Results for orders placed or performed during the hospital encounter of 05/29/19   2D echo with color flow doppler   Result Value Ref Range    QEF 60 55 - 65    Diastolic Dysfunction No     Est. PA Systolic Pressure 30.46

## 2019-06-02 NOTE — PROGRESS NOTES
[facility]  Pulmonology  Progress Note    Patient Name: Chapin Calderon  MRN: [id number]  Admission Date: 5/29/2019  Hospital Length of Stay: 4 days  Code Status: Full Code  Attending Provider: Mani Dorado MD  Primary Care Provider: Tiffanie Vicente MD   Principal Problem: NSTEMI (non-ST elevated myocardial infarction)    Subjective:     Interval History:  Seen and examined at bedside. Hospital chart reviewed. No acute interval detrimental events noted.  Mediastinal and pleural  DISCONTIUED. MID LINE placed. Stable hemodynamics. No arrhythmias. Up in chair and eating breakfast..  He reports that he  has moderately improved.        Review of Systems   Constitutional: Negative.  Negative for chills and fever.   HENT: Negative.  Negative for congestion and sore throat.    Eyes: Negative.  Negative for visual disturbance.   Respiratory: Negative.  Negative for cough, shortness of breath and wheezing.    Cardiovascular: Negative for chest pain.   Gastrointestinal: Negative for abdominal pain, diarrhea, nausea and vomiting.   Endocrine: Negative.    Genitourinary: Negative.    Musculoskeletal: Negative.  Negative for myalgias and neck stiffness.   Skin: Negative.  Negative for color change and pallor.   Allergic/Immunologic: Negative.    Neurological: Negative.    Hematological: Negative.    Psychiatric/Behavioral: Negative.    All other systems reviewed and are negative.    Scheduled Meds:   aspirin  81 mg Oral Daily    atorvastatin  80 mg Oral Daily    chlorhexidine  10 mL Mouth/Throat BID    clopidogrel  75 mg Oral Daily    cyanocobalamin  1,000 mcg Oral Daily    ferrous sulfate  325 mg Oral BID    folic acid  1 mg Oral Daily    furosemide  40 mg Intravenous BID    metOLazone  2.5 mg Oral Once    metoprolol tartrate  25 mg Oral BID    nozaseptin   Each Nare BID    pantoprazole  40 mg Intravenous Daily    polyethylene glycol  17 g Oral Daily     Continuous Infusions:    PRN Meds:.sodium  chloride, sodium chloride, acetaminophen, albumin human 5%, ceFAZolin (ANCEF) IVPB, chlorhexidine, dextrose 50%, dextrose 50%, glucagon (human recombinant), glucose, glucose, insulin aspart U-100, lactated ringers, magnesium sulfate IVPB, metoclopramide HCl, metoprolol tartrate, nozaseptin, ondansetron, ondansetron, potassium chloride in water **AND** potassium chloride in water **AND** potassium chloride in water, traMADol    Objective:     Vital Signs (Most Recent):  Temp: 98.9 °F (37.2 °C) (06/02/19 0730)  Pulse: 91 (06/02/19 0845)  Resp: (!) 30 (06/02/19 0845)  BP: (!) 112/55 (06/02/19 0800)  SpO2: 96 % (06/02/19 0735) Vital Signs (24h Range):  Temp:  [98.8 °F (37.1 °C)-100.1 °F (37.8 °C)] 98.9 °F (37.2 °C)  Pulse:  [90-92] 91  Resp:  [17-36] 30  SpO2:  [94 %-100 %] 96 %  BP: ()/(54-68) 112/55     Weight: 78.9 kg (173 lb 15.1 oz)  Body mass index is 27.24 kg/m².      Intake/Output Summary (Last 24 hours) at 6/2/2019 1121  Last data filed at 6/2/2019 0900  Gross per 24 hour   Intake 1295.9 ml   Output 2393 ml   Net -1097.1 ml       Physical Exam   Constitutional: He is oriented to person, place, and time. He appears well-developed and well-nourished. No distress.   HENT:   Head: Normocephalic and atraumatic.   Eyes: Pupils are equal, round, and reactive to light. EOM are normal.   Neck: Normal range of motion. Neck supple. No JVD present.   Cardiovascular: Normal rate, regular rhythm, normal heart sounds and intact distal pulses. Exam reveals no gallop and no friction rub.   No murmur heard.  Paced AAI. Hemodynamically stable. Mediastinal chest tubes in place x 2.    Pulmonary/Chest: Effort normal. No stridor. No respiratory distress. He has no wheezes. He has no rales. He exhibits tenderness.    TOD pleural chest tubes in place.     Abdominal: Soft. Bowel sounds are normal. He exhibits no distension. There is no tenderness. There is no guarding.   Musculoskeletal: Normal range of motion. He exhibits edema.  He exhibits no tenderness or deformity.   1+ pitting edema to TOD LE, s/p EVH to TOD LE.    Neurological: He is alert and oriented to person, place, and time. He displays normal reflexes. No cranial nerve deficit or sensory deficit. He exhibits normal muscle tone. Coordination normal.   Skin: Skin is warm and dry. Capillary refill takes less than 2 seconds. No rash noted. He is not diaphoretic. No erythema. No pallor.   Psychiatric: He has a normal mood and affect. His behavior is normal. Judgment and thought content normal.   Nursing note and vitals reviewed.      Vents:  Vent Mode: Spont (05/31/19 2050)  Set Rate: 0 bmp (05/31/19 2050)  Vt Set: 0 mL (05/31/19 2050)  Pressure Support: 10 cmH20 (05/31/19 2050)  PEEP/CPAP: 5 cmH20 (05/31/19 2050)  Oxygen Concentration (%): 40 (05/31/19 2103)  Peak Airway Pressure: 16 cmH2O (05/31/19 2050)  Plateau Pressure: 0 cmH20 (05/31/19 2050)  Total Ve: 11.5 mL (05/31/19 2050)  Negative Inspiratory Force (cm H2O): -32 (05/31/19 2104)  F/VT Ratio<105 (RSBI): (!) 53.76 (05/31/19 2050)    Lines/Drains/Airways     Drain                 Urethral Catheter 05/31/19 0725 Latex;Straight-tip;Temperature probe 16 Fr. 2 days         Y Chest Tube 1 and 2 05/31/19 1302 1 Right Mediastinal 24 Fr. 2 Left Mediastinal 24 Fr. 1 day         Y Chest Tube 3 and 4 05/31/19 1303 3 Right Pleural 24 Fr. Left Pleural 24 Fr. 1 day          Line                 Pacer Wires 05/31/19 1530 1 day          Peripheral Intravenous Line                 Peripheral IV - Single Lumen 05/29/19 0820 20 G Left Forearm 4 days         Midline Catheter Insertion/Assessment  - Single Lumen 06/01/19 0920 Left brachial vein 18g x 10cm 1 day                Significant Labs:    CBC/Anemia Profile:  Recent Labs   Lab 06/01/19  1415 06/01/19  1810 06/02/19  0405   WBC 21.37* 21.73* 22.28*   HGB 10.4* 10.3* 9.7*   HCT 29.4* 29.2* 27.8*   * 113* 114*   MCV 85 85 85   RDW 14.3 14.5 14.7*        Chemistries:  Recent Labs   Lab  06/01/19  0345  06/01/19  1449 06/01/19  1810 06/02/19  0405      < > 138 136 135*   K 3.8   < > 4.4 5.0 5.0      < > 99 100 98   CO2 24   < > 28 26 31*   BUN 17   < > 20 21 25*   CREATININE 1.1   < > 1.1 1.1 1.0   CALCIUM 8.7   < > 9.8 9.4 8.9   MG 2.6  --  2.3 2.6 2.4   PHOS 2.9  --   --   --  3.1    < > = values in this interval not displayed.       ABGs:   Recent Labs   Lab 05/31/19  1505   PH 7.403   PCO2 46.5*   HCO3 29.0*   POCSATURATED 100   BE 4     Coagulation:   Recent Labs   Lab 06/01/19  0345 06/02/19  0405   INR 1.1  --    APTT 32.6*  32.6* 26.8     POCT Glucose:   Recent Labs   Lab 06/01/19  1715 06/01/19  2100 06/02/19  0554   POCTGLUCOSE 128* 142* 134*       Significant Imaging:    Chest x-ray: Postoperative changes of the chest identified.  Mediastinum wires again noted.  [place] catheter has been removed.  Apparent mediastinal drain ranged in place.  Bilateral chest tubes remain in place.  Mild atelectasis at the lung bases.  Negative for pneumothorax.  Negative for pulmonary vascular congestion or heart failure.      ABG  Recent Labs   Lab 05/31/19  1505   PH 7.403   PO2 364*   PCO2 46.5*   HCO3 29.0*   BE 4     Assessment/Plan:     * NSTEMI (non-ST elevated myocardial infarction)-resolved as of 6/2/2019  LHC: Severe multivessel CAD, Normal LVEF  ECHO revealed EF 60-65%   S/P Revascularization:     S/P CABG x 5  POD # 2:     Paced: Mode AAI, Rate 90, A mA 10, V mA 10:  normal pacemaker function and stable pacing and sensing thresholds.     STERNAL INCISION: staples in place; wound clean, dry, and intact,     STABLE HEMODYNAMICS.    Monitor hemodynamics and  monitor for dysrhythmias MAP goal of  65 mmHg.      CARDIAC STERNAL PRECAUTIONS.       CARDIOACTIVE MEDS:   EPINEPHRINE,CARDENE      DIURETICS:  FUROSEMIDE      Coronary artery disease involving native coronary artery of native heart with angina pectoris      [x]        Exercise-based cardiac rehabilitation   [x]        Weight  management is recommended by the AHA for the secondary prevention  []        Smoking cessation counseling.  [x]        Treatment of High Blood Pressure   (goal of < 140/90 mm Hg // < 130/80 mm Hg in patients with DM or CKD)  [x]        Beta-blocker therapy.  [x]        Aspirin therapy (81 to 162 mg daily)   [x]        Statin therapy.   [x]        ACE inhibitor therapy.  [x]        Antiplatelet Agent therapy.  []        Influenza Vaccination.      Leukocytosis  Likely stress margination: Monitor and trend.     Neuro  Neurochecks per routine.    Renal/  Monitor BUN / Cr. [place] urine output. Monitor and replete electrolytes per protocol.    ID  Monitor fever curve. panculture for temperatures greater than 101 degrees F. Source control: n/a      Hematology  Monitor hemogram. Transfuse as needed.    Endocrine  Insulin ASPART - MODERATE CORRECTION DOSE. Stable EUGLYCEMIA . Blood glucose target 100 - 180 mg/dl        GI  Stress ulcer precautions. GI regimen:  PRN anti emetics, anti diarrheals and stool softeners. Diet Adult Regular (IDDSI Level 7) [facility]; Cardiac (Low Na/Chol) when able ( post extubation)          Continue current ongoing medical management. OK to transfer patient out of the ICU. Will sign off upon ICU transfer. Please re consult if further pulmonary issues arise.     Dann Mcclure MD  Pulmonology  [facility]

## 2019-06-02 NOTE — PT/OT/SLP PROGRESS
Physical Therapy  Treatment    Chapin Calderon   MRN: [id number]   Admitting Diagnosis: NSTEMI (non-ST elevated myocardial infarction)    PT Received On: 05/31/19  PT Start Time: 0740     PT Stop Time: 0805    PT Total Time (min): 25 min       Billable Minutes:  Therapeutic Activity 15 and Therapeutic Exercise 10    Treatment Type: Treatment  PT/PTA: PT             General Precautions: Standard, fall, sternal  Orthopedic Precautions: N/A   Braces: N/A         Subjective:  Communicated with VERONICA Gallegos prior to session.      Pain/Comfort  Pain Rating 1: 0/10    Objective:   Patient found with: blood pressure cuff, cordero catheter, chest tube, pulse ox (continuous), peripheral IV, oxygen, telemetry    Functional Mobility:  Bed Mobility: up in chair - nt       Transfers: sit to stand min/cga; inc time needed       Gait: MIP x 20 reps       Stairs: nt      Balance:   Static Sit: sba  Dynamic Sit: sba  Static Stand: cga/sba  Dynamic stand: min/cga     Therapeutic Activities and Exercises:  PT educated patient/cg's on pOC, sternal/safety/fall precautions with mobility, le exs to do in chair/breathing exs and role of PT.     AM-PAC 6 CLICK MOBILITY  How much help from another person does this patient currently need?   1 = Unable, Total/Dependent Assistance  2 = A lot, Maximum/Moderate Assistance  3 = A little, Minimum/Contact Guard/Supervision  4 = None, Modified [place]/Independent    Turning over in bed (including adjusting bedclothes, sheets and blankets)?: 3  Sitting down on and standing up from a chair with arms (e.g., wheelchair, bedside commode, etc.): 3  Moving from lying on back to sitting on the side of the bed?: 1(not tested - up in chair)  Moving to and from a bed to a chair (including a wheelchair)?: 3  Need to walk in hospital room?: 1(attached to toomany lines to amb; will do later today)  Climbing 3-5 steps with a railing?: 1  Basic Mobility Total Score: 12    AM-PAC Raw Score CMS G-Code Modifier Level of  Impairment Assistance   6 % Total / Unable   7 - 9 CM 80 - 100% Maximal Assist   10 - 14 CL 60 - 80% Moderate Assist   15 - 19 CK 40 - 60% Moderate Assist   20 - 22 CJ 20 - 40% Minimal Assist   23 CI 1-20% SBA / CGA   24 CH 0% Independent/ Mod I     Patient left up in chair with all lines intact, call button in reach, RN notified and family present.    Assessment:  Chapin Calderon is a 73 y.o. male with a medical diagnosis of NSTEMI (non-ST elevated myocardial infarction) and presents with impaired mobility.    Rehab identified problem list/impairments: Rehab identified problem list/impairments: weakness, impaired functional mobilty, decreased safety awareness, impaired endurance, gait instability, impaired cardiopulmonary response to activity, impaired balance, impaired self care skills    Rehab potential is good.    Activity tolerance: Good    Discharge recommendations: Discharge Facility/Level of Care Needs: home health PT     Barriers to discharge:      Equipment recommendations: Equipment Needed After Discharge: shower chair     GOALS:   Multidisciplinary Problems     Physical Therapy Goals        Problem: Physical Therapy Goal    Goal Priority Disciplines Outcome Goal Variances Interventions   Physical Therapy Goal     PT, PT/OT Ongoing (interventions implemented as appropriate)     Description:  1. Patient will perform sup to/from sit cga  2. Patient will perform sit to/from stand sba to prep amb  3. Patient will amb >300ft sba no gross LOB                    PLAN:    Patient to be seen 5 x/week  to address the above listed problems via gait training, therapeutic activities, therapeutic exercises  Plan of Care expires: 06/08/19  Plan of Care reviewed with: patient, spouse, daughter    PT G-Codes  Functional Assessment Tool Used: [facility]  Score: 14    Mckay Cooper, PT  06/02/2019

## 2019-06-02 NOTE — PLAN OF CARE
Problem: Physical Therapy Goal  Goal: Physical Therapy Goal  1. Patient will perform sup to/from sit cga  2. Patient will perform sit to/from stand sba to prep amb  3. Patient will amb >300ft sba no gross LOB   Outcome: Ongoing (interventions implemented as appropriate)  Patient was able to amb 40ft min A no AD on 2nd visit, but limited by dizziness from low BP and had to return to chair; RN present. Sit to stand cga.

## 2019-06-02 NOTE — NURSING TRANSFER
Nursing Transfer Note      6/2/2019     Transfer TRANSFER TO: TELE 248 /FROM:ICU 19    Transfer via : wheelchair    Transfer with : clothing     Transported by: VERONICA Gallegos / Juan RN    Medicines sent: Noszin / chloraseptic / aztreonam    Chart send with patient: yes    Notified: VERONICA Hurtado    Upon arrival to floor: 1610    - Patient stable and comfortable  / family in room

## 2019-06-02 NOTE — PROGRESS NOTES
[facility] Hospital Medicine  Progress Note    Patient Name: Chapin Calderon  MRN: [id number]  Patient Class: IP- Inpatient   Admission Date: 5/29/2019  Length of Stay: 3 days  Attending Physician: Mani Dorado MD  Primary Care Provider: Tiffanie Vicente MD        Subjective:     Principal Problem:NSTEMI (non-ST elevated myocardial infarction)    HPI:  Chapin Calderon is a 73 year old male with a past medical history of prostate cancer who presented to the ED with complaints of chest pain that began one day prior to presentation. The pain is described as a constant pressure in his left chest that rates a 6/10 on a pain scale. The patient denies radiation and associated symptoms including SOB, nausea, vomiting, diaphoresis and lower extremity edema. He tried Tums with no relieved. However, symptoms resolved shortly after receiving an Asprin in the ED. In the ED, the patient's initial troponin was 6.324 with an increase to 7.465. Code status was discussed with the patient. He is a full code. His wife, Daniela Calderon, is his surrogate medical decision maker.      Hospital Course:  C revealed multi-vessel disease.  Evaluation by CV Surgery with a recommendation for CABG.  Successful CABG 31 May.  Post-op in ICU.    06/01- 73 year old man s/p -status-post coronary artery bypass grafting x 5.  He was seen with wife in the room.  CBC showed-  Component      Latest Ref Rng & Units 6/1/2019 6/1/2019 6/1/2019 5/31/2019           6:10 PM  2:15 PM  3:45 AM  9:17 PM   WBC      3.90 - 12.70 K/uL 21.73 (H) 21.37 (H) 14.56 (H) 17.74 (H)          Interval History:   06/01- 73 year old man s/p -status-post coronary artery bypass grafting x 5. He was seen with wife in the room.  CBC showed-  Component      Latest Ref Rng & Units 6/1/2019 6/1/2019 6/1/2019 5/31/2019           6:10 PM  2:15 PM  3:45 AM  9:17 PM   WBC      3.90 - 12.70 K/uL 21.73 (H) 21.37 (H) 14.56 (H) 17.74 (H)       Review of Systems    Constitutional: Negative.  Negative for chills and fever.   HENT: Negative.  Negative for congestion and sore throat.    Eyes: Negative.  Negative for visual disturbance.   Respiratory: Negative.  Negative for cough, shortness of breath and wheezing.    Cardiovascular: Negative for chest pain.   Gastrointestinal: Negative for abdominal pain, diarrhea, nausea and vomiting.   Endocrine: Negative.    Genitourinary: Negative.    Musculoskeletal: Negative.  Negative for myalgias and neck stiffness.   Skin: Negative.  Negative for color change and pallor.   Allergic/Immunologic: Negative.    Neurological: Negative.    Hematological: Negative.    Psychiatric/Behavioral: Negative.    All other systems reviewed and are negative.    Objective:     Vital Signs (Most Recent):  Temp: 100.1 °F (37.8 °C) (06/01/19 1900)  Pulse: 91 (06/01/19 2215)  Resp: 20 (06/01/19 2215)  BP: 121/68 (06/01/19 2200)  SpO2: 99 % (06/01/19 2215) Vital Signs (24h Range):  Temp:  [99.2 °F (37.3 °C)-100.4 °F (38 °C)] 100.1 °F (37.8 °C)  Pulse:  [] 91  Resp:  [19-40] 20  SpO2:  [90 %-100 %] 99 %  BP: ()/(39-68) 121/68     Weight: 80.2 kg (176 lb 12.9 oz)  Body mass index is 27.69 kg/m².    Intake/Output Summary (Last 24 hours) at 6/1/2019 2314  Last data filed at 6/1/2019 2200  Gross per 24 hour   Intake 3182.34 ml   Output 2656 ml   Net 526.34 ml      Physical Exam   Constitutional: He is oriented to person, place, and time. He appears well-developed and well-nourished. No distress.   HENT:   Head: Normocephalic and atraumatic.   Eyes: Pupils are equal, round, and reactive to light. EOM are normal.   Neck: Normal range of motion. Neck supple. No JVD present.   Cardiovascular: Normal rate, regular rhythm, normal heart sounds and intact distal pulses. Exam reveals no gallop and no friction rub.   No murmur heard.  Paced AAI. Hemodynamically stable. Mediastinal chest tubes in place x 2.    Pulmonary/Chest: Effort normal. No stridor. No  respiratory distress. He has no wheezes. He has no rales. He exhibits tenderness.    TOD pleural chest tubes in place.     Abdominal: Soft. Bowel sounds are normal. He exhibits no distension. There is no tenderness. There is no guarding.   Musculoskeletal: Normal range of motion. He exhibits edema. He exhibits no tenderness or deformity.   1+ pitting edema to TOD LE, s/p EVH to TOD LE.    Neurological: He is alert and oriented to person, place, and time. He displays normal reflexes. No cranial nerve deficit or sensory deficit. He exhibits normal muscle tone. Coordination normal.   Skin: Skin is warm and dry. Capillary refill takes less than 2 seconds. No rash noted. He is not diaphoretic. No erythema. No pallor.   Psychiatric: He has a normal mood and affect. His behavior is normal. Judgment and thought content normal.   Nursing note and vitals reviewed.      Significant Labs:   BMP:   Recent Labs   Lab 06/01/19  1810   *      K 5.0      CO2 26   BUN 21   CREATININE 1.1   CALCIUM 9.4   MG 2.6     CBC:   Recent Labs   Lab 06/01/19  0345 06/01/19  1415 06/01/19  1810   WBC 14.56* 21.37* 21.73*   HGB 7.3* 10.4* 10.3*   HCT 20.0* 29.4* 29.2*   * 112* 113*     All pertinent labs within the past 24 hours have been reviewed.    Significant Imaging: I have reviewed and interpreted all pertinent imaging results/findings within the past 24 hours.    Assessment/Plan:      * NSTEMI (non-ST elevated myocardial infarction)  Continue ASA, heparin, metoprolol and Lipitor.  Echocardiogram and lipid panel pending.  Patient was evaluated for Cardiac Rehab and is not a candidate at this time.  [facility] revealed multi-vessel disease.  Dr. Dorado performed 5 vessel CABG on 31 May.    06/01-will continue cardiology follow up, on lipitor, metoprolol, monitor closely.    Anemia following surgery    06/01- hemoglobin is 10.3, will monitor closely and transfuse as needed    S/P CABG x 5    06.01- CVT follow up, continue  supportive care     Leukocytosis  -Likely reactive.   -Continue to monitor for signs or symptoms of infection.     06/01- will send serum procalcitonin in AM.  CBC showed- wbc -21.7  Will continue to monitor closely .  Chest x-ray showed-Mild atelectasis on the left.  No significant pulmonary vascular congestion or heart failure        VTE Risk Mitigation (From admission, onward)        Ordered     IP VTE LOW RISK PATIENT  Once      05/30/19 0752     Place SKY hose  Until discontinued      05/30/19 0752     Place sequential compression device  Until discontinued      05/30/19 0752     heparin 25,000 units in dextrose 5% 250 mL (100 units/mL) infusion LOW INTENSITY nomogram - [facility]  Continuous      05/29/19 2001     Reason for no Mechanical VTE Prophylaxis  Once      05/29/19 1431          Critical care time spent on the evaluation and treatment of severe organ dysfunction, review of pertinent labs and imaging studies, discussions with consulting providers and discussions with patient/family:  45 minutes.    Jose Eduardo Cantu MD  Department of Hospital Medicine   [facility]

## 2019-06-02 NOTE — SUBJECTIVE & OBJECTIVE
Interval History:  06/02/2019  The patient is post-operative day 2, status-post coronary artery bypass grafting x 5.     ROS  Medications:  Continuous Infusions:  Scheduled Meds:   aspirin  81 mg Oral Daily    atorvastatin  80 mg Oral Daily    chlorhexidine  10 mL Mouth/Throat BID    clopidogrel  75 mg Oral Daily    cyanocobalamin  1,000 mcg Oral Daily    ferrous sulfate  325 mg Oral BID    folic acid  1 mg Oral Daily    furosemide  40 mg Intravenous BID    metOLazone  2.5 mg Oral Once    metoprolol tartrate  25 mg Oral BID    nozaseptin   Each Nare BID    pantoprazole  40 mg Intravenous Daily    polyethylene glycol  17 g Oral Daily     PRN Meds:sodium chloride, sodium chloride, acetaminophen, albumin human 5%, ceFAZolin (ANCEF) IVPB, chlorhexidine, dextrose 50%, dextrose 50%, glucagon (human recombinant), glucose, glucose, insulin aspart U-100, lactated ringers, magnesium sulfate IVPB, metoclopramide HCl, metoprolol tartrate, nozaseptin, ondansetron, ondansetron, potassium chloride in water **AND** potassium chloride in water **AND** potassium chloride in water, traMADol     Objective:     Vital Signs (Most Recent):  Temp: 98.9 °F (37.2 °C) (06/02/19 0730)  Pulse: 91 (06/02/19 0845)  Resp: (!) 30 (06/02/19 0845)  BP: (!) 112/55 (06/02/19 0800)  SpO2: 96 % (06/02/19 0735) Vital Signs (24h Range):  Temp:  [98.8 °F (37.1 °C)-100.1 °F (37.8 °C)] 98.9 °F (37.2 °C)  Pulse:  [90-92] 91  Resp:  [17-36] 30  SpO2:  [94 %-100 %] 96 %  BP: ()/(54-68) 112/55     Weight: 78.9 kg (173 lb 15.1 oz)  Body mass index is 27.24 kg/m².    SpO2: 96 %  O2 Device (Oxygen Therapy): nasal cannula    Intake/Output - Last 3 Shifts       05/31 0700 - 06/01 0659 06/01 0700 - 06/02 0659 06/02 0700 - 06/03 0659    P.O.  1400     I.V. (mL/kg) 3907.8 (48.7) 60.3 (0.8)     Blood 350 825     IV Piggyback 300 100     Total Intake(mL/kg) 4557.8 (56.8) 2385.3 (30.2)     Urine (mL/kg/hr) 1301 (0.7) 2112 (1.1)     Drains 105 15     Stool  0 0 0    Chest Tube 730 650 70    Total Output 2136 2777 70    Net +2421.8 -391.7 -70           Stool Occurrence 0 x 0 x 0 x          Lines/Drains/Airways     Drain                 Urethral Catheter 05/31/19 0725 Latex;Straight-tip;Temperature probe 16 Fr. 2 days         Y Chest Tube 1 and 2 05/31/19 1302 1 Right Mediastinal 24 Fr. 2 Left Mediastinal 24 Fr. 1 day         Y Chest Tube 3 and 4 05/31/19 1303 3 Right Pleural 24 Fr. Left Pleural 24 Fr. 1 day          Line                 Pacer Wires 05/31/19 1530 1 day          Peripheral Intravenous Line                 Peripheral IV - Single Lumen 05/29/19 0820 20 G Left Forearm 4 days         Midline Catheter Insertion/Assessment  - Single Lumen 06/01/19 0920 Left brachial vein 18g x 10cm 1 day                Physical Exam   Constitutional: He is oriented to person, place, and time. He appears well-developed and well-nourished. No distress.   HENT:   Head: Normocephalic and atraumatic.   Eyes: Pupils are equal, round, and reactive to light. EOM are normal.   Neck: Normal range of motion. Neck supple. No JVD present.   Cardiovascular: Normal rate, regular rhythm, normal heart sounds and intact distal pulses. Exam reveals no gallop and no friction rub.   No murmur heard.  Hemodynamically stable. NSR paced AAI. Mediastinal chest tubes in place x 2.    Pulmonary/Chest: Effort normal and breath sounds normal. No stridor. No respiratory distress. He has no wheezes. He has no rales. He exhibits tenderness.   TOD pleural chest tubes in place.    Abdominal: Soft. Bowel sounds are normal. He exhibits no distension. There is no tenderness. There is no guarding.   Musculoskeletal: Normal range of motion. He exhibits edema. He exhibits no tenderness or deformity.   1+ pitting edema to the TOD LE. S/P EVH from TOD LE.    Neurological: He is alert and oriented to person, place, and time. He displays normal reflexes. No cranial nerve deficit or sensory deficit. He exhibits normal  muscle tone. Coordination normal.   Skin: Skin is warm and dry. Capillary refill takes less than 2 seconds. No rash noted. He is not diaphoretic. No erythema. No pallor.   Psychiatric: He has a normal mood and affect. His behavior is normal. Judgment and thought content normal.   Nursing note and vitals reviewed.      Significant Labs:  BMP:   Recent Labs   Lab 06/02/19  0405   *   *   K 5.0   CL 98   CO2 31*   BUN 25*   CREATININE 1.0   CALCIUM 8.9   MG 2.4     CBC:   Recent Labs   Lab 06/02/19  0405   WBC 22.28*   RBC 3.26*   HGB 9.7*   HCT 27.8*   *   MCV 85   MCH 29.8   MCHC 34.9       Significant Diagnostics:  I have reviewed and interpreted all pertinent imaging results/findings within the past 24 hours.

## 2019-06-02 NOTE — PROGRESS NOTES
Pt transferred to floor. Alert and oriented x 3. Cooperative with care. Denies chest pain. Heart with rrr. Pulses palpable. Pt a paced. Pacer wires intact. Only atrial wires in pacemaker. Chest incision with old drainage noted. Intact. Rt and left leg dressings wnl. 2 + edema to ble. Pulses palpable. Tele on . Lungs coarse. Encouraged dbc 10x q1h wa. . o2 at 2l nc. Abdomen soft. No flatus. No bm. Voiding per urinal . Family at bedside. Oriented to room and call light. Water, phone and call light in reach. scd's on. Yaakov hose on. Instructed to call for assist out of bed.

## 2019-06-02 NOTE — SUBJECTIVE & OBJECTIVE
Interval History:  Seen and examined at bedside. Hospital chart reviewed. No acute interval detrimental events noted.  Mediastinal and pleural  DISCONTIUED. MID LINE placed. Stable hemodynamics. No arrhythmias. Up in chair and eating breakfast..  He reports that he  has moderately improved.        Review of Systems   Constitutional: Negative.  Negative for chills and fever.   HENT: Negative.  Negative for congestion and sore throat.    Eyes: Negative.  Negative for visual disturbance.   Respiratory: Negative.  Negative for cough, shortness of breath and wheezing.    Cardiovascular: Negative for chest pain.   Gastrointestinal: Negative for abdominal pain, diarrhea, nausea and vomiting.   Endocrine: Negative.    Genitourinary: Negative.    Musculoskeletal: Negative.  Negative for myalgias and neck stiffness.   Skin: Negative.  Negative for color change and pallor.   Allergic/Immunologic: Negative.    Neurological: Negative.    Hematological: Negative.    Psychiatric/Behavioral: Negative.    All other systems reviewed and are negative.    Scheduled Meds:   aspirin  81 mg Oral Daily    atorvastatin  80 mg Oral Daily    chlorhexidine  10 mL Mouth/Throat BID    clopidogrel  75 mg Oral Daily    cyanocobalamin  1,000 mcg Oral Daily    ferrous sulfate  325 mg Oral BID    folic acid  1 mg Oral Daily    furosemide  40 mg Intravenous BID    metOLazone  2.5 mg Oral Once    metoprolol tartrate  25 mg Oral BID    nozaseptin   Each Nare BID    pantoprazole  40 mg Intravenous Daily    polyethylene glycol  17 g Oral Daily     Continuous Infusions:    PRN Meds:.sodium chloride, sodium chloride, acetaminophen, albumin human 5%, ceFAZolin (ANCEF) IVPB, chlorhexidine, dextrose 50%, dextrose 50%, glucagon (human recombinant), glucose, glucose, insulin aspart U-100, lactated ringers, magnesium sulfate IVPB, metoclopramide HCl, metoprolol tartrate, nozaseptin, ondansetron, ondansetron, potassium chloride in water **AND**  potassium chloride in water **AND** potassium chloride in water, traMADol    Objective:     Vital Signs (Most Recent):  Temp: 98.9 °F (37.2 °C) (06/02/19 0730)  Pulse: 91 (06/02/19 0845)  Resp: (!) 30 (06/02/19 0845)  BP: (!) 112/55 (06/02/19 0800)  SpO2: 96 % (06/02/19 0735) Vital Signs (24h Range):  Temp:  [98.8 °F (37.1 °C)-100.1 °F (37.8 °C)] 98.9 °F (37.2 °C)  Pulse:  [90-92] 91  Resp:  [17-36] 30  SpO2:  [94 %-100 %] 96 %  BP: ()/(54-68) 112/55     Weight: 78.9 kg (173 lb 15.1 oz)  Body mass index is 27.24 kg/m².      Intake/Output Summary (Last 24 hours) at 6/2/2019 1121  Last data filed at 6/2/2019 0900  Gross per 24 hour   Intake 1295.9 ml   Output 2393 ml   Net -1097.1 ml       Physical Exam   Constitutional: He is oriented to person, place, and time. He appears well-developed and well-nourished. No distress.   HENT:   Head: Normocephalic and atraumatic.   Eyes: Pupils are equal, round, and reactive to light. EOM are normal.   Neck: Normal range of motion. Neck supple. No JVD present.   Cardiovascular: Normal rate, regular rhythm, normal heart sounds and intact distal pulses. Exam reveals no gallop and no friction rub.   No murmur heard.  Paced AAI. Hemodynamically stable. Mediastinal chest tubes in place x 2.    Pulmonary/Chest: Effort normal. No stridor. No respiratory distress. He has no wheezes. He has no rales. He exhibits tenderness.    TOD pleural chest tubes in place.     Abdominal: Soft. Bowel sounds are normal. He exhibits no distension. There is no tenderness. There is no guarding.   Musculoskeletal: Normal range of motion. He exhibits edema. He exhibits no tenderness or deformity.   1+ pitting edema to TOD LE, s/p EVH to TOD LE.    Neurological: He is alert and oriented to person, place, and time. He displays normal reflexes. No cranial nerve deficit or sensory deficit. He exhibits normal muscle tone. Coordination normal.   Skin: Skin is warm and dry. Capillary refill takes less than 2  seconds. No rash noted. He is not diaphoretic. No erythema. No pallor.   Psychiatric: He has a normal mood and affect. His behavior is normal. Judgment and thought content normal.   Nursing note and vitals reviewed.      Vents:  Vent Mode: Spont (05/31/19 2050)  Set Rate: 0 bmp (05/31/19 2050)  Vt Set: 0 mL (05/31/19 2050)  Pressure Support: 10 cmH20 (05/31/19 2050)  PEEP/CPAP: 5 cmH20 (05/31/19 2050)  Oxygen Concentration (%): 40 (05/31/19 2103)  Peak Airway Pressure: 16 cmH2O (05/31/19 2050)  Plateau Pressure: 0 cmH20 (05/31/19 2050)  Total Ve: 11.5 mL (05/31/19 2050)  Negative Inspiratory Force (cm H2O): -32 (05/31/19 2104)  F/VT Ratio<105 (RSBI): (!) 53.76 (05/31/19 2050)    Lines/Drains/Airways     Drain                 Urethral Catheter 05/31/19 0725 Latex;Straight-tip;Temperature probe 16 Fr. 2 days         Y Chest Tube 1 and 2 05/31/19 1302 1 Right Mediastinal 24 Fr. 2 Left Mediastinal 24 Fr. 1 day         Y Chest Tube 3 and 4 05/31/19 1303 3 Right Pleural 24 Fr. Left Pleural 24 Fr. 1 day          Line                 Pacer Wires 05/31/19 1530 1 day          Peripheral Intravenous Line                 Peripheral IV - Single Lumen 05/29/19 0820 20 G Left Forearm 4 days         Midline Catheter Insertion/Assessment  - Single Lumen 06/01/19 0920 Left brachial vein 18g x 10cm 1 day                Significant Labs:    CBC/Anemia Profile:  Recent Labs   Lab 06/01/19  1415 06/01/19 1810 06/02/19  0405   WBC 21.37* 21.73* 22.28*   HGB 10.4* 10.3* 9.7*   HCT 29.4* 29.2* 27.8*   * 113* 114*   MCV 85 85 85   RDW 14.3 14.5 14.7*        Chemistries:  Recent Labs   Lab 06/01/19  0345  06/01/19  1449 06/01/19 1810 06/02/19  0405      < > 138 136 135*   K 3.8   < > 4.4 5.0 5.0      < > 99 100 98   CO2 24   < > 28 26 31*   BUN 17   < > 20 21 25*   CREATININE 1.1   < > 1.1 1.1 1.0   CALCIUM 8.7   < > 9.8 9.4 8.9   MG 2.6  --  2.3 2.6 2.4   PHOS 2.9  --   --   --  3.1    < > = values in this interval not  displayed.       ABGs:   Recent Labs   Lab 05/31/19  1505   PH 7.403   PCO2 46.5*   HCO3 29.0*   POCSATURATED 100   BE 4     Coagulation:   Recent Labs   Lab 06/01/19  0345 06/02/19  0405   INR 1.1  --    APTT 32.6*  32.6* 26.8     POCT Glucose:   Recent Labs   Lab 06/01/19  1715 06/01/19  2100 06/02/19  0554   POCTGLUCOSE 128* 142* 134*       Significant Imaging:    Chest x-ray: Postoperative changes of the chest identified.  Mediastinum wires again noted.  [place] catheter has been removed.  Apparent mediastinal drain ranged in place.  Bilateral chest tubes remain in place.  Mild atelectasis at the lung bases.  Negative for pneumothorax.  Negative for pulmonary vascular congestion or heart failure.

## 2019-06-02 NOTE — PT/OT/SLP PROGRESS
"Occupational Therapy  Treatment    Chapin Calderon   MRN: [id number]   Admitting Diagnosis: NSTEMI (non-ST elevated myocardial infarction)    OT Date of Treatment: 06/02/19   OT Start Time: 0825  OT Stop Time: 0840  OT Total Time (min): 15 min    Billable Minutes:  Therapeutic Activity 15    General Precautions: Standard, fall, sternal  Orthopedic Precautions: N/A  Braces: N/A         Subjective:  Communicated with NURSE VICTORIA prior to session.    Pain/Comfort  Pain Rating 1: 0/10  Pain Rating Post-Intervention 1: 0/10    Objective:  Patient found with: blood pressure cuff, chest tube, cordero catheter, peripheral IV, telemetry, pulse ox (continuous)     Functional Mobility:  Bed Mobility:       Transfers:        Functional Ambulation: UNABLE DUE TO CHEST TUBES    Balance:   Static Sit: GOOD: Takes MODERATE challenges from all directions  Dynamic Sit: GOOD-: Incosistently Maintains balance through MODERATE excursions of active trunk movement,     Static Stand: FAIR: Maintains without assist but unable to take challenges  Dynamic stand: POOR+: Needs MIN (minimal ) assist during gait    Therapeutic Activities and Exercises:  PT WITH LIMITED PARTICIPATED ONLY DUE TO CHEST TUBES AND CATHETER STILL REMAINING. HE DID STAND WITH CGA/MIN A FROM BEDSIDE CHAIR AND COMPLETED MARCHING IN PLACE. PT WITH GOOD SAFETY AWARENESS REGARDING DECENT BACK INTO CHAIR. HE DECLINED FURTHER ACTIVITY, AS HE SAW CARDIOLOGIST OUTSIDE OF ROOM AND DID NOT WANT TO "TAKE TIME AWAY FROM THE DOCTOR." PT LEFT WITH ALL LINES INTACT WITH STAFF AND FAMILY PRESENT. FOLLOWED STERNAL PRECAUTIONS WITHOUT CUES.    AM-PAC 6 CLICK ADL   How much help from another person does this patient currently need?   1 = Unable, Total/Dependent Assistance  2 = A lot, Maximum/Moderate Assistance  3 = A little, Minimum/Contact Guard/Supervision  4 = None, Modified [place]/Independent    Putting on and taking off regular lower body clothing? : 2  Bathing (including washing, " "rinsing, drying)?: 2  Toileting, which includes using toilet, bedpan, or urinal? : 1(WELSH)  Putting on and taking off regular upper body clothing?: 3  Taking care of personal grooming such as brushing teeth?: 3  Eating meals?: 4  Daily Activity Total Score: 15     AM-PAC Raw Score CMS "G-Code Modifier Level of Impairment Assistance   6 % Total / Unable   7 - 8 CM 80 - 100% Maximal Assist   9-13 CL 60 - 80% Moderate Assist   14 - 19 CK 40 - 60% Moderate Assist   20 - 22 CJ 20 - 40% Minimal Assist   23 CI 1-20% SBA / CGA   24 CH 0% Independent/ Mod I       Patient left up in chair with all lines intact, call button in reach and NURSING AND DOCTOR present    ASSESSMENT:  Chapin Calderon is a 73 y.o. male with a medical diagnosis of NSTEMI (non-ST elevated myocardial infarction) and presents with DEBILITY, IMPAIRED ADLS FUNCTIONAL MOBILITY AND SAFETY AWARENESS. PT WILL BENEFIT FROM CONTINUED SKILLED OT SERVICES TO ADDRESS STATED DEFICITS AND INCREASE FUNCTIONAL INDEPENDENCE.    Rehab identified problem list/impairments: Rehab identified problem list/impairments: weakness, impaired endurance, gait instability, impaired functional mobilty, impaired self care skills, impaired balance, decreased safety awareness, pain, orthopedic precautions    Rehab potential is good.    Activity tolerance: Good    Discharge recommendations: Discharge Facility/Level of Care Needs: home health OT     Barriers to discharge: Barriers to Discharge: None    Equipment recommendations: shower chair     GOALS:   Multidisciplinary Problems     Occupational Therapy Goals        Problem: Occupational Therapy Goal    Goal Priority Disciplines Outcome Interventions   Occupational Therapy Goal     OT, PT/OT Ongoing (interventions implemented as appropriate)    Description:  Goals to be met by: 6/8/19     Patient will increase functional independence with ADLs by performing:    UE Dressing with Minimal Assistance.  LE Dressing with Moderate " Assistance.  Grooming while standing at sink with Contact Guard Assistance.  Toileting from toilet with Moderate Assistance for hygiene and clothing management.   Toilet transfer to toilet with Minimal Assistance.                      Plan:  Patient to be seen 3 x/week to address the above listed problems via self-care/home management, therapeutic activities, therapeutic exercises  Plan of Care expires: 06/08/19  Plan of Care reviewed with: patient, spouse, family    OT G-codes  Functional Assessment Tool Used: [place] AM-PAC  Score: 15  Functional Limitation: Self care    Maegan Guaman OT  06/02/2019

## 2019-06-02 NOTE — PROGRESS NOTES
Pt wanted blood drawn from midline. On floor it is not protocol. Notified dotty fermin in office. Sent message to duane if ok to use midline. Duane parham np with cardio thoracic wants midline to draw blood and give meds. Orders to use midline for lab draws.

## 2019-06-02 NOTE — ASSESSMENT & PLAN NOTE
06/01/2019  The patient is post-operative day 1, status-post coronary artery bypass grafting x 5.  Overall the patient is doing well.    Neuro:  The patient is awake alert and oriented x3.  Neuro exam is nonfocal.  Pain is well controlled. D/C fentanyl.   Cardiac:  Patient is off all gtts.  Excellent cardiac index.  Patient will be started on metoprolol.   Respiratory: Good sats on nasal cannula.  Continue respiratory toilet, continue incentive spirometer.  GI:  Advance diet as tolerated. Cardiac diet.    Renal:  Good urine output. Cr 1.1. K 3.8. K replaced. Mag 2.6.  Heme:  Hct 20.  Plt 124. Administer 2 units PRBC. Continue Aspirin and Plavix.   Id:  White count is 14. Tmax 100.8  Suspect secondary to stress of surgery. Will continue to follow.  Endocrine:  Glucose is controlled with insulin gtt.  Discontinue insulin gtt. Start sliding scale insulin.   Activities:  Patient is out of bed to chair.  Advance activities as tolerated.  Lines tubes and drains:  Discontinue [place] and Cordis.  Discontinue A-line. Discontinue cordero catheter. Discontinue AV x2. Continue chest tubes x 4 for now.  Continue pacer wires. Start midline.          06/02/2019  The patient is post-operative day 2, status-post coronary artery bypass grafting x 5.  Overall the patient is doing well. Anticipate transfer to telemetry today.    Neuro:  The patient is awake alert and oriented x3.  Neuro exam is nonfocal.  Pain is well controlled. D/C oxycodone. Start tramadol.   Cardiac: Hemodynamically stable. Continue metoprolol at current dose.   Respiratory: Good sats on nasal cannula.  Continue respiratory toilet, continue incentive spirometer. Wean to room air.   GI:  Advance diet as tolerated. Cardiac diet.    Renal:  Good urine output. Cr 1.0. K 5.0. Hold K today. Mag 2.4. Once dose of Metolazone. Repeat BMP at 1300.    Heme:  Hct 27.8.  Plt 114. Continue Aspirin and Plavix.   Id:  White count is 22. Tmax 100.4  Suspect secondary to stress of  surgery. Will continue to follow.  Endocrine:  Glucose is well controlled with sliding scale insulin.   Activities:  Patient is out of bed to chair.  Advance activities as tolerated.  Lines tubes and drains: Discontinue chest tubes x4. Discontinue cordero catheter. Continue pacer wires. Continue midline.  Plan: Anticipate transfer to telemetry today.

## 2019-06-02 NOTE — SUBJECTIVE & OBJECTIVE
Interval History:  06/02-  73 year old man s/p CABG X5 , he denies fever or chest pain.  Wbc is 9.7      Review of Systems   Constitutional: Negative.  Negative for chills and fever.   HENT: Negative.  Negative for congestion and sore throat.    Eyes: Negative.  Negative for visual disturbance.   Respiratory: Negative.  Negative for cough, shortness of breath and wheezing.    Cardiovascular: Negative for chest pain.   Gastrointestinal: Negative for abdominal pain, diarrhea, nausea and vomiting.   Endocrine: Negative.    Genitourinary: Negative.    Musculoskeletal: Negative.  Negative for myalgias and neck stiffness.   Skin: Negative.  Negative for color change and pallor.   Allergic/Immunologic: Negative.    Neurological: Negative.    Hematological: Negative.    Psychiatric/Behavioral: Negative.    All other systems reviewed and are negative.    Objective:     Vital Signs (Most Recent):  Temp: 98.2 °F (36.8 °C) (06/02/19 1105)  Pulse: 91 (06/02/19 1200)  Resp: (!) 22 (06/02/19 1200)  BP: (!) 94/52 (06/02/19 1200)  SpO2: 95 % (06/02/19 1200) Vital Signs (24h Range):  Temp:  [98.2 °F (36.8 °C)-100.1 °F (37.8 °C)] 98.2 °F (36.8 °C)  Pulse:  [90-91] 91  Resp:  [16-36] 22  SpO2:  [95 %-100 %] 95 %  BP: ()/(51-68) 94/52     Weight: 78.9 kg (173 lb 15.1 oz)  Body mass index is 27.24 kg/m².    Intake/Output Summary (Last 24 hours) at 6/2/2019 1250  Last data filed at 6/2/2019 0900  Gross per 24 hour   Intake 500 ml   Output 1832 ml   Net -1332 ml      Physical Exam   Constitutional: He is oriented to person, place, and time. He appears well-developed and well-nourished. No distress.   HENT:   Head: Normocephalic and atraumatic.   Eyes: Pupils are equal, round, and reactive to light. EOM are normal.   Neck: Normal range of motion. Neck supple. No JVD present.   Cardiovascular: Normal rate, regular rhythm, normal heart sounds and intact distal pulses. Exam reveals no gallop and no friction rub.   No murmur heard.  Paced  AAI. Hemodynamically stable. Mediastinal chest tubes in place x 2.    Pulmonary/Chest: Effort normal. No stridor. No respiratory distress. He has no wheezes. He has no rales. He exhibits tenderness.    TOD pleural chest tubes in place.     Abdominal: Soft. Bowel sounds are normal. He exhibits no distension. There is no tenderness. There is no guarding.   Musculoskeletal: Normal range of motion. He exhibits edema. He exhibits no tenderness or deformity.   1+ pitting edema to TOD LE, s/p EVH to TOD LE.    Neurological: He is alert and oriented to person, place, and time. He displays normal reflexes. No cranial nerve deficit or sensory deficit. He exhibits normal muscle tone. Coordination normal.   Skin: Skin is warm and dry. Capillary refill takes less than 2 seconds. No rash noted. He is not diaphoretic. No erythema. No pallor.   Psychiatric: He has a normal mood and affect. His behavior is normal. Judgment and thought content normal.   Nursing note and vitals reviewed.      Significant Labs:   CBC:   Recent Labs   Lab 06/01/19  1415 06/01/19  1810 06/02/19  0405   WBC 21.37* 21.73* 22.28*   HGB 10.4* 10.3* 9.7*   HCT 29.4* 29.2* 27.8*   * 113* 114*     All pertinent labs within the past 24 hours have been reviewed.    Significant Imaging: I have reviewed and interpreted all pertinent imaging results/findings within the past 24 hours.

## 2019-06-02 NOTE — PLAN OF CARE
Problem: Adult Inpatient Plan of Care  Goal: Plan of Care Review  Outcome: Ongoing (interventions implemented as appropriate)     06/02/19 1145   Plan of Care Review   Plan of Care Reviewed With patient   Progress improving     Patient is up in chair communicating with family members. Patient had all three chest tubes removed by PITER Carpenter. Patient tolerated well. Higgins catheter was also removed. Patient is making progress with incentive spirometer reaching a peak of 1000 mL of air compared to earlier result of 500 mL. Patient ambulated around the room today with help of physical therapy. VERONICA Gallegos also ambulated patient around the ICU. Plan of care reviewed with patient and family.

## 2019-06-03 LAB
ANION GAP SERPL CALC-SCNC: 7 MMOL/L (ref 8–16)
APTT BLDCRRT: 25.1 SEC (ref 21–32)
BASOPHILS # BLD AUTO: 0.02 K/UL (ref 0–0.2)
BASOPHILS NFR BLD: 0.1 % (ref 0–1.9)
BLD PROD TYP BPU: NORMAL
BLOOD UNIT EXPIRATION DATE: NORMAL
BLOOD UNIT TYPE CODE: 7300
BLOOD UNIT TYPE: NORMAL
BUN SERPL-MCNC: 26 MG/DL (ref 8–23)
CALCIUM SERPL-MCNC: 8.7 MG/DL (ref 8.7–10.5)
CHLORIDE SERPL-SCNC: 94 MMOL/L (ref 95–110)
CO2 SERPL-SCNC: 30 MMOL/L (ref 23–29)
CODING SYSTEM: NORMAL
CREAT SERPL-MCNC: 0.9 MG/DL (ref 0.5–1.4)
DIFFERENTIAL METHOD: ABNORMAL
DISPENSE STATUS: NORMAL
EOSINOPHIL # BLD AUTO: 0 K/UL (ref 0–0.5)
EOSINOPHIL NFR BLD: 0.2 % (ref 0–8)
ERYTHROCYTE [DISTWIDTH] IN BLOOD BY AUTOMATED COUNT: 13.3 % (ref 11.5–14.5)
EST. GFR  (AFRICAN AMERICAN): >60 ML/MIN/1.73 M^2
EST. GFR  (NON AFRICAN AMERICAN): >60 ML/MIN/1.73 M^2
GLUCOSE SERPL-MCNC: 94 MG/DL (ref 70–110)
HCT VFR BLD AUTO: 26.4 % (ref 40–54)
HGB BLD-MCNC: 9 G/DL (ref 14–18)
LYMPHOCYTES # BLD AUTO: 1.3 K/UL (ref 1–4.8)
LYMPHOCYTES NFR BLD: 7.2 % (ref 18–48)
MAGNESIUM SERPL-MCNC: 2.1 MG/DL (ref 1.6–2.6)
MAGNESIUM SERPL-MCNC: 2.1 MG/DL (ref 1.6–2.6)
MCH RBC QN AUTO: 30.4 PG (ref 27–31)
MCHC RBC AUTO-ENTMCNC: 34.1 G/DL (ref 32–36)
MCV RBC AUTO: 89 FL (ref 82–98)
MONOCYTES # BLD AUTO: 1.4 K/UL (ref 0.3–1)
MONOCYTES NFR BLD: 7.8 % (ref 4–15)
NEUTROPHILS # BLD AUTO: 14.8 K/UL (ref 1.8–7.7)
NEUTROPHILS NFR BLD: 84.7 % (ref 38–73)
NUM UNITS TRANS PACKED RBC: NORMAL
PHOSPHATE SERPL-MCNC: 2.3 MG/DL (ref 2.7–4.5)
PLATELET # BLD AUTO: 134 K/UL (ref 150–350)
PMV BLD AUTO: 10 FL (ref 9.2–12.9)
POCT GLUCOSE: 106 MG/DL (ref 70–110)
POCT GLUCOSE: 134 MG/DL (ref 70–110)
POCT GLUCOSE: 97 MG/DL (ref 70–110)
POTASSIUM SERPL-SCNC: 3.9 MMOL/L (ref 3.5–5.1)
RBC # BLD AUTO: 2.96 M/UL (ref 4.6–6.2)
SODIUM SERPL-SCNC: 131 MMOL/L (ref 136–145)
WBC # BLD AUTO: 17.47 K/UL (ref 3.9–12.7)

## 2019-06-03 PROCEDURE — 94761 N-INVAS EAR/PLS OXIMETRY MLT: CPT

## 2019-06-03 PROCEDURE — 85025 COMPLETE CBC W/AUTO DIFF WBC: CPT

## 2019-06-03 PROCEDURE — 99232 SBSQ HOSP IP/OBS MODERATE 35: CPT | Mod: ,,, | Performed by: INTERNAL MEDICINE

## 2019-06-03 PROCEDURE — 85730 THROMBOPLASTIN TIME PARTIAL: CPT

## 2019-06-03 PROCEDURE — 27000221 HC OXYGEN, UP TO 24 HOURS

## 2019-06-03 PROCEDURE — 25000003 PHARM REV CODE 250: Performed by: INTERNAL MEDICINE

## 2019-06-03 PROCEDURE — 99232 PR SUBSEQUENT HOSPITAL CARE,LEVL II: ICD-10-PCS | Mod: ,,, | Performed by: INTERNAL MEDICINE

## 2019-06-03 PROCEDURE — 25000003 PHARM REV CODE 250: Performed by: NURSE PRACTITIONER

## 2019-06-03 PROCEDURE — 97803 MED NUTRITION INDIV SUBSEQ: CPT

## 2019-06-03 PROCEDURE — 21400001 HC TELEMETRY ROOM

## 2019-06-03 PROCEDURE — 99900035 HC TECH TIME PER 15 MIN (STAT)

## 2019-06-03 PROCEDURE — 84100 ASSAY OF PHOSPHORUS: CPT

## 2019-06-03 PROCEDURE — 97110 THERAPEUTIC EXERCISES: CPT

## 2019-06-03 PROCEDURE — S0073 INJECTION, AZTREONAM, 500 MG: HCPCS | Performed by: INTERNAL MEDICINE

## 2019-06-03 PROCEDURE — 97116 GAIT TRAINING THERAPY: CPT

## 2019-06-03 PROCEDURE — 83735 ASSAY OF MAGNESIUM: CPT

## 2019-06-03 PROCEDURE — 94799 UNLISTED PULMONARY SVC/PX: CPT

## 2019-06-03 PROCEDURE — 80048 BASIC METABOLIC PNL TOTAL CA: CPT

## 2019-06-03 PROCEDURE — 97530 THERAPEUTIC ACTIVITIES: CPT

## 2019-06-03 PROCEDURE — 83735 ASSAY OF MAGNESIUM: CPT | Mod: 91

## 2019-06-03 PROCEDURE — 36415 COLL VENOUS BLD VENIPUNCTURE: CPT

## 2019-06-03 RX ORDER — POTASSIUM CHLORIDE 20 MEQ/1
20 TABLET, EXTENDED RELEASE ORAL 2 TIMES DAILY
Status: DISCONTINUED | OUTPATIENT
Start: 2019-06-03 | End: 2019-06-04

## 2019-06-03 RX ORDER — FUROSEMIDE 40 MG/1
40 TABLET ORAL 2 TIMES DAILY
Status: DISCONTINUED | OUTPATIENT
Start: 2019-06-03 | End: 2019-06-04

## 2019-06-03 RX ADMIN — PANTOPRAZOLE SODIUM 40 MG: 40 TABLET, DELAYED RELEASE ORAL at 09:06

## 2019-06-03 RX ADMIN — FOLIC ACID 1 MG: 1 TABLET ORAL at 09:06

## 2019-06-03 RX ADMIN — FUROSEMIDE 40 MG: 40 TABLET ORAL at 05:06

## 2019-06-03 RX ADMIN — FUROSEMIDE 40 MG: 40 TABLET ORAL at 09:06

## 2019-06-03 RX ADMIN — FERROUS SULFATE TAB EC 325 MG (65 MG FE EQUIVALENT) 325 MG: 325 (65 FE) TABLET DELAYED RESPONSE at 09:06

## 2019-06-03 RX ADMIN — CHLORHEXIDINE GLUCONATE 0.12% ORAL RINSE 10 ML: 1.2 LIQUID ORAL at 10:06

## 2019-06-03 RX ADMIN — CLOPIDOGREL BISULFATE 75 MG: 75 TABLET ORAL at 09:06

## 2019-06-03 RX ADMIN — POTASSIUM CHLORIDE 20 MEQ: 1500 TABLET, EXTENDED RELEASE ORAL at 10:06

## 2019-06-03 RX ADMIN — CYANOCOBALAMIN TAB 1000 MCG 1000 MCG: 1000 TAB at 09:06

## 2019-06-03 RX ADMIN — FERROUS SULFATE TAB EC 325 MG (65 MG FE EQUIVALENT) 325 MG: 325 (65 FE) TABLET DELAYED RESPONSE at 10:06

## 2019-06-03 RX ADMIN — LINEZOLID 600 MG: 600 TABLET, FILM COATED ORAL at 10:06

## 2019-06-03 RX ADMIN — AZTREONAM 1000 MG: 1 INJECTION, POWDER, LYOPHILIZED, FOR SOLUTION INTRAMUSCULAR; INTRAVENOUS at 04:06

## 2019-06-03 RX ADMIN — METOPROLOL TARTRATE 25 MG: 25 TABLET ORAL at 10:06

## 2019-06-03 RX ADMIN — AZTREONAM 1000 MG: 1 INJECTION, POWDER, LYOPHILIZED, FOR SOLUTION INTRAMUSCULAR; INTRAVENOUS at 09:06

## 2019-06-03 RX ADMIN — CHLORHEXIDINE GLUCONATE 0.12% ORAL RINSE 10 ML: 1.2 LIQUID ORAL at 09:06

## 2019-06-03 RX ADMIN — METOPROLOL TARTRATE 25 MG: 25 TABLET ORAL at 09:06

## 2019-06-03 RX ADMIN — ASPIRIN 81 MG: 81 TABLET, COATED ORAL at 09:06

## 2019-06-03 RX ADMIN — LINEZOLID 600 MG: 600 TABLET, FILM COATED ORAL at 09:06

## 2019-06-03 RX ADMIN — POTASSIUM CHLORIDE 20 MEQ: 1500 TABLET, EXTENDED RELEASE ORAL at 09:06

## 2019-06-03 RX ADMIN — ATORVASTATIN CALCIUM 80 MG: 40 TABLET, FILM COATED ORAL at 09:06

## 2019-06-03 NOTE — PROGRESS NOTES
[facility] -   Cardiothoracic Surgery  Progress Note    Patient Name: Chapin Calderon  MRN: [id number]  Admission Date: 5/29/2019  Hospital Length of Stay: 5 days  Code Status: Full Code   Attending Physician: Mani Dorado MD   Referring Provider: Self, Aaareferral  Principal Problem:NSTEMI (non-ST elevated myocardial infarction)            Subjective:     Post-Op Info:  Procedure(s) (LRB):  CORONARY ARTERY BYPASS GRAFT (CABG) (N/A)  ECHOCARDIOGRAM,TRANSESOPHAGEAL (N/A)  SURGICAL PROCUREMENT, VEIN, ENDOSCOPIC (Bilateral)   3 Days Post-Op     Interval History:  06/03/2019  The patient is post-operative day 3, status-post coronary artery bypass grafting x 5.    ROS  Medications:  Continuous Infusions:  Scheduled Meds:   aspirin  81 mg Oral Daily    atorvastatin  80 mg Oral Daily    aztreonam  1,000 mg Intravenous Q8H    chlorhexidine  10 mL Mouth/Throat BID    clopidogrel  75 mg Oral Daily    cyanocobalamin  1,000 mcg Oral Daily    ferrous sulfate  325 mg Oral BID    folic acid  1 mg Oral Daily    furosemide  40 mg Oral BID    linezolid  600 mg Oral Q12H    metoprolol tartrate  25 mg Oral BID    nozaseptin   Each Nare BID    pantoprazole  40 mg Oral Daily    polyethylene glycol  17 g Oral Daily    potassium chloride  20 mEq Oral BID     PRN Meds:sodium chloride, sodium chloride, acetaminophen, albumin human 5%, ceFAZolin (ANCEF) IVPB, chlorhexidine, dextrose 50%, dextrose 50%, glucagon (human recombinant), glucose, glucose, insulin aspart U-100, lactated ringers, magnesium sulfate IVPB, metoclopramide HCl, metoprolol tartrate, nozaseptin, ondansetron, ondansetron, potassium chloride in water **AND** potassium chloride in water **AND** potassium chloride in water, traMADol     Objective:     Vital Signs (Most Recent):  Temp: 99 °F (37.2 °C) (06/03/19 0321)  Pulse: 89 (06/03/19 0731)  Resp: 18 (06/03/19 0731)  BP: (!) 92/54 (06/03/19 0321)  SpO2: 96 % (06/03/19 0731) Vital Signs (24h  Range):  Temp:  [98.2 °F (36.8 °C)-99.8 °F (37.7 °C)] 99 °F (37.2 °C)  Pulse:  [89-91] 89  Resp:  [15-32] 18  SpO2:  [95 %-99 %] 96 %  BP: ()/(44-62) 92/54     Weight: 78.9 kg (173 lb 15.1 oz)  Body mass index is 27.24 kg/m².    SpO2: 96 %  O2 Device (Oxygen Therapy): nasal cannula    Intake/Output - Last 3 Shifts       06/01 0700 - 06/02 0659 06/02 0700 - 06/03 0659 06/03 0700 - 06/04 0659    P.O. 1400 638     I.V. (mL/kg) 60.3 (0.8) 250 (3.2)     Blood 825      IV Piggyback 100      Total Intake(mL/kg) 2385.3 (30.2) 888 (11.3)     Urine (mL/kg/hr) 2112 (1.1) 991 (0.5)     Drains 15      Stool 0 0     Chest Tube 650 70     Total Output 2777 1061     Net -391.7 -173            Stool Occurrence 0 x 2 x           Lines/Drains/Airways     Line                 Pacer Wires 05/31/19 1530 2 days          Peripheral Intravenous Line                 Peripheral IV - Single Lumen 05/29/19 0820 20 G Left Forearm 4 days         Midline Catheter Insertion/Assessment  - Single Lumen 06/01/19 0920 Left brachial vein 18g x 10cm 1 day                Physical Exam   Constitutional: He is oriented to person, place, and time. He appears well-developed and well-nourished. No distress.   HENT:   Head: Normocephalic and atraumatic.   Eyes: Pupils are equal, round, and reactive to light. EOM are normal.   Neck: Normal range of motion. Neck supple. No JVD present.   Cardiovascular: Normal rate, regular rhythm, normal heart sounds and intact distal pulses. Exam reveals no gallop and no friction rub.   No murmur heard.  Hemodynamically stable. NSR paced AAI.    Pulmonary/Chest: Effort normal and breath sounds normal. No stridor. No respiratory distress. He has no wheezes. He has no rales. He exhibits tenderness.   Abdominal: Soft. Bowel sounds are normal. He exhibits no distension. There is no tenderness. There is no guarding.   Musculoskeletal: Normal range of motion. He exhibits edema. He exhibits no tenderness or deformity.   2+  pitting edema to the TOD LE. S/P EVH to the TOD LE. ROM 5/5 TOD LE. Strength 5/5 TOD LE. + dorsiflexion, + plantar flexion to the TOD LE. No calf tenderness TOD.    Neurological: He is alert and oriented to person, place, and time. He displays normal reflexes. No cranial nerve deficit or sensory deficit. He exhibits normal muscle tone. Coordination normal.   Skin: Skin is warm and dry. Capillary refill takes less than 2 seconds. No rash noted. He is not diaphoretic. No erythema. No pallor.   Psychiatric: He has a normal mood and affect. His behavior is normal. Judgment and thought content normal.   Nursing note and vitals reviewed.      Significant Labs:  BMP:   Recent Labs   Lab 06/03/19  0426   GLU 94   *   K 3.9   CL 94*   CO2 30*   BUN 26*   CREATININE 0.9   CALCIUM 8.7   MG 2.1     CBC:   Recent Labs   Lab 06/02/19  0405   WBC 22.28*   RBC 3.26*   HGB 9.7*   HCT 27.8*   *   MCV 85   MCH 29.8   MCHC 34.9       Significant Diagnostics:  I have reviewed all pertinent imaging results/findings within the past 24 hours.    Assessment/Plan:     S/P CABG x 5       06/01/2019  The patient is post-operative day 1, status-post coronary artery bypass grafting x 5.  Overall the patient is doing well.    Neuro:  The patient is awake alert and oriented x3.  Neuro exam is nonfocal.  Pain is well controlled. D/C fentanyl.   Cardiac:  Patient is off all gtts.  Excellent cardiac index.  Patient will be started on metoprolol.   Respiratory: Good sats on nasal cannula.  Continue respiratory toilet, continue incentive spirometer.  GI:  Advance diet as tolerated. Cardiac diet.    Renal:  Good urine output. Cr 1.1. K 3.8. K replaced. Mag 2.6.  Heme:  Hct 20.  Plt 124. Administer 2 units PRBC. Continue Aspirin and Plavix.   Id:  White count is 14. Tmax 100.8  Suspect secondary to stress of surgery. Will continue to follow.  Endocrine:  Glucose is controlled with insulin gtt.  Discontinue insulin gtt. Start sliding scale  insulin.   Activities:  Patient is out of bed to chair.  Advance activities as tolerated.  Lines tubes and drains:  Discontinue [place] and Cordis.  Discontinue A-line. Discontinue cordero catheter. Discontinue AV x2. Continue chest tubes x 4 for now.  Continue pacer wires. Start midline.          06/02/2019  The patient is post-operative day 2, status-post coronary artery bypass grafting x 5.  Overall the patient is doing well. Anticipate transfer to telemetry today.    Neuro:  The patient is awake alert and oriented x3.  Neuro exam is nonfocal.  Pain is well controlled. D/C oxycodone. Start tramadol.   Cardiac: Hemodynamically stable. Continue metoprolol at current dose.   Respiratory: Good sats on nasal cannula.  Continue respiratory toilet, continue incentive spirometer. Wean to room air.   GI:  Advance diet as tolerated. Cardiac diet.    Renal:  Good urine output. Cr 1.0. K 5.0. Hold K today. Mag 2.4. Once dose of Metolazone. Repeat BMP at 1300.    Heme:  Hct 27.8.  Plt 114. Continue Aspirin and Plavix.   Id:  White count is 22. Tmax 100.4  Suspect secondary to stress of surgery. Will continue to follow.  Endocrine:  Glucose is well controlled with sliding scale insulin.   Activities:  Patient is out of bed to chair.  Advance activities as tolerated.  Lines tubes and drains: Discontinue chest tubes x4. Discontinue cordero catheter. Continue pacer wires. Continue midline.  Plan: Anticipate transfer to telemetry today.          06/03/2019  The patient is post-operative day 3, status-post coronary artery bypass grafting x 5.  Overall the patient is doing well. Anticipate discharge to home health in 24-48 hours.     Neuro:  The patient is awake alert and oriented x3.  Neuro exam is nonfocal.  Pain is well controlled.   Cardiac: Hemodynamically stable. Continue metoprolol at current dose.   Respiratory: Good sats on nasal cannula.  Continue respiratory toilet, continue incentive spirometer. Wean to room air.   GI:  Advance  diet as tolerated. Cardiac diet. Regular BM x2 yesterday.   Renal:  Good urine output. Cr 0.9. K 3.9. PO Lasix BID, PO K BID.   Heme:   CBC pending. Continue Aspirin and Plavix.   Id:   CBC pending. Tmax 99.8. ID on board r/t increased WBC. On linezolid and aztreonam. Will continue to follow.  Endocrine:  Glucose is well controlled with sliding scale insulin.   Activities:  Patient is out of bed to chair.  Advance activities as tolerated. Increase PT. Increase ambulation.   Lines tubes and drains: Continue pacer wires. Continue midline.  Plan: Anticipate discharge to home health in 24-48 hours.        Coronary artery disease involving native coronary artery of native heart with angina pectoris  05/29/2019  The patient is a 73 year old male with 80% proximal LAD stenosis that was worked up for NSTEMI. The patient has a history of prostate cancer. The patient with 80% proximal LAD stenosis and multivessel coronary artery disease is a candidate for urgent coronary artery bypass grafting. The risks and benefits of surgery have been explained to the patient, his wife, and his adult daughter, in great detail. All questions have been answered to the patient's satisfaction. The patient has agreed to go ahead with coronary artery bypass grafting, which will be scheduled for 05/31/2019.         Duane Carpenter NP  Cardiothoracic Surgery  [facility] [place]

## 2019-06-03 NOTE — PLAN OF CARE
06/03/19 1552   Discharge Reassessment   Assessment Type Discharge Planning Reassessment   Provided patient/caregiver education on the expected discharge date and the discharge plan Yes   Do you have any problems affording any of your prescribed medications? No   Discharge Plan A Home;Home with family;Home Health   DME Needed Upon Discharge  none   Patient choice form signed by patient/caregiver Yes   Anticipated Discharge Disposition Home-Health   Can the patient answer the patient profile reliably? Yes, cognitively intact   How does the patient rate their overall health at the present time? Fair   Describe the patient's ability to walk at the present time. Minor restrictions or changes   How often would a person be available to care for the patient? Whenever needed   Number of comorbid conditions (as recorded on the chart) Four

## 2019-06-03 NOTE — PROGRESS NOTES
Pt instructed on importance of dbc 10 x q1h wa. Instructed on turning q2h. Instructed on fall risk and precautions. Sternal precautions.

## 2019-06-03 NOTE — PLAN OF CARE
Problem: Physical Therapy Goal  Goal: Physical Therapy Goal  1. Patient will perform sup to/from sit cga  2. Patient will perform sit to/from stand sba to prep amb  3. Patient will amb >300ft sba no gross LOB   Outcome: Ongoing (interventions implemented as appropriate)  PATIENT PROGRESSING WITH GT IN HALLWAY 'X2 AT CGAX1. GOOD STEADY PACE, ADHERE WELL WITH STERNAL PERCAUTIONS. PATIENT IS STEADY ON HIS FEET.

## 2019-06-03 NOTE — PLAN OF CARE
Problem: Physical Therapy Goal  Goal: Physical Therapy Goal  1. Patient will perform sup to/from sit cga  2. Patient will perform sit to/from stand sba to prep amb  3. Patient will amb >300ft sba no gross LOB   Outcome: Ongoing (interventions implemented as appropriate)  GOOD PARTICIPATION, LIONEL FOR BED MOBILITY, CGA FOR TF'S AND GAIT

## 2019-06-03 NOTE — PROGRESS NOTES
"  [facility] -   Adult Nutrition  Progress Note    SUMMARY     Recommendations    Recommendation: 1. Continue current diet and ONS while intake remains poor (<75%) 2.Will continue to monitor  Intervention: Heart Healthy Diet Edcuation  Goals: Meet >85% EEN/EPN while admitted  Nutrition Goal Status: continues  Communication of RD Recs: POC, sticky note    Reason for Assessment    Reason For Assessment: RD f/u  Diagnosis:   1. NSTEMI (non-ST elevated myocardial infarction)    2. Chest pain    3. Other specified enthesopathies of unspecified lower limb, excluding foot    4. Post-operative state      Relevant Medical History:   Past Medical History:   Diagnosis Date    CAD, multiple vessel 6/1/2019    Prostate cancer      General Information Comments:   5.31.19:RD consulted for post-op education (POD #1: CABG). Unable to speak w/ pt as pt was w/ providers at both visit attempts. Pt w/ good intake (100%) per epic review.  NFPE not performed, to be completed at f/u  6.3.19: Pt w/ good appetite PTA and no recent wt loss. Pt eating 50-75% of meals currently, he believes his decrease in appetite is d/t medication. NFPE performed 6.3.19: pt well nourished. Educated pt on Heart Healthy Diet w/ handout from the Nutrition Care Manual. Pt verbalized understanding. All questions and concerns answered; RD contact information provided.  Nutrition Discharge Planning: Cardiac Diet    Nutrition Risk Screen    Nutrition Risk Screen: no indicators present    Nutrition/Diet History    Spiritual, Cultural Beliefs, Judaism Practices, Values that Affect Care: yes    Anthropometrics    Temp: 99.1 °F (37.3 °C)  Height: 5' 7" (170.2 cm)  Height (inches): 67 in  Weight Method: Bed Scale  Weight: 78.9 kg (173 lb 15.1 oz)  Weight (lb): 173.94 lb  Ideal Body Weight (IBW), Male: 148 lb  % Ideal Body Weight, Male (lb): 109.64 lb  BMI (Calculated): 25.5  BMI Grade: 25 - 29.9 - overweight       Lab/Procedures/Meds    Pertinent Labs " Reviewed: reviewed  BMP  Lab Results   Component Value Date     (L) 06/03/2019    K 3.9 06/03/2019    CL 94 (L) 06/03/2019    CO2 30 (H) 06/03/2019    BUN 26 (H) 06/03/2019    CREATININE 0.9 06/03/2019    CALCIUM 8.7 06/03/2019    ANIONGAP 7 (L) 06/03/2019    ESTGFRAFRICA >60 06/03/2019    EGFRNONAA >60 06/03/2019     Lab Results   Component Value Date    CALCIUM 8.7 06/03/2019    PHOS 2.3 (L) 06/03/2019     Lab Results   Component Value Date    HGBA1C 5.3 05/30/2019     Recent Labs   Lab 06/03/19  1136   POCTGLUCOSE 134*     Lab Results   Component Value Date    ALBUMIN 3.6 05/31/2019       Pertinent Medications Reviewed: reviewed    Physical Findings/Assessment  Incision Site: chest  Incision site: R leg  Incision Site: L leg    Estimated/Assessed Needs    Weight Used For Calorie Calculations: 73.6 kg (162 lb 4.1 oz)  Energy Calorie Requirements (kcal): 2299-4951  Energy Need Method: kcal/kg (cancer)  Protein Requirements: 73-88g  Weight Used For Protein Calculations: 73.6 kg (162 lb 4.1 oz)     Estimated Fluid Requirement Method: RDA Method(or per MD)  RDA Method (mL): 1714         Nutrition Prescription Ordered    Current Diet Order: Cardiac Diet    Evaluation of Received Nutrient/Fluid Intake          Intake/Output Summary (Last 24 hours) at 6/3/2019 1449  Last data filed at 6/2/2019 2105  Gross per 24 hour   Intake 250 ml   Output 400 ml   Net -150 ml       % Intake of Estimated Energy Needs: 75 - 100 %  % Meal Intake: %    Nutrition Risk  [id number]    Assessment and Plan    Nutrition Problem  Decreased nutrient needs (na, fat)    Related to (etiology):   Current medical diagnosis    Signs and Symptoms (as evidenced by):   MI  CAD    Interventions/Recommendations (treatment strategy):  See above    Nutrition Diagnosis Status:   Continues       Monitor and Evaluation    Food and Nutrient Intake: energy intake  Food and Nutrient Adminstration: diet order  Anthropometric Measurements: weight, weight  change  Biochemical Data, Medical Tests and Procedures: electrolyte and renal panel, inflammatory profile, gastrointestinal profile, lipid profile, glucose/endocrine profile  Nutrition-Focused Physical Findings: overall appearance       Nutrition Follow-Up    RD Follow-up?: Yes

## 2019-06-03 NOTE — ASSESSMENT & PLAN NOTE
CABG x 5V per CT surgery  Intubated, sedated in ICU today post surgery  Mgmt per CT surgery  Continue ASA, Statin, BB  Resume ARB once OK with CT surgery  Will continue to follow along    6/3/19  -Stable overnight  -Continue ASA, statin, BB  -Resume ARB when ok with CVT  -Ambulation and IS usage

## 2019-06-03 NOTE — SUBJECTIVE & OBJECTIVE
Interval History:  06/03/2019  The patient is post-operative day 3, status-post coronary artery bypass grafting x 5.    ROS  Medications:  Continuous Infusions:  Scheduled Meds:   aspirin  81 mg Oral Daily    atorvastatin  80 mg Oral Daily    aztreonam  1,000 mg Intravenous Q8H    chlorhexidine  10 mL Mouth/Throat BID    clopidogrel  75 mg Oral Daily    cyanocobalamin  1,000 mcg Oral Daily    ferrous sulfate  325 mg Oral BID    folic acid  1 mg Oral Daily    furosemide  40 mg Oral BID    linezolid  600 mg Oral Q12H    metoprolol tartrate  25 mg Oral BID    nozaseptin   Each Nare BID    pantoprazole  40 mg Oral Daily    polyethylene glycol  17 g Oral Daily    potassium chloride  20 mEq Oral BID     PRN Meds:sodium chloride, sodium chloride, acetaminophen, albumin human 5%, ceFAZolin (ANCEF) IVPB, chlorhexidine, dextrose 50%, dextrose 50%, glucagon (human recombinant), glucose, glucose, insulin aspart U-100, lactated ringers, magnesium sulfate IVPB, metoclopramide HCl, metoprolol tartrate, nozaseptin, ondansetron, ondansetron, potassium chloride in water **AND** potassium chloride in water **AND** potassium chloride in water, traMADol     Objective:     Vital Signs (Most Recent):  Temp: 99 °F (37.2 °C) (06/03/19 0321)  Pulse: 89 (06/03/19 0731)  Resp: 18 (06/03/19 0731)  BP: (!) 92/54 (06/03/19 0321)  SpO2: 96 % (06/03/19 0731) Vital Signs (24h Range):  Temp:  [98.2 °F (36.8 °C)-99.8 °F (37.7 °C)] 99 °F (37.2 °C)  Pulse:  [89-91] 89  Resp:  [15-32] 18  SpO2:  [95 %-99 %] 96 %  BP: ()/(44-62) 92/54     Weight: 78.9 kg (173 lb 15.1 oz)  Body mass index is 27.24 kg/m².    SpO2: 96 %  O2 Device (Oxygen Therapy): nasal cannula    Intake/Output - Last 3 Shifts       06/01 0700 - 06/02 0659 06/02 0700 - 06/03 0659 06/03 0700 - 06/04 0659    P.O. 1400 638     I.V. (mL/kg) 60.3 (0.8) 250 (3.2)     Blood 825      IV Piggyback 100      Total Intake(mL/kg) 2385.3 (30.2) 888 (11.3)     Urine (mL/kg/hr) 2112  (1.1) 991 (0.5)     Drains 15      Stool 0 0     Chest Tube 650 70     Total Output 2777 1061     Net -391.7 -173            Stool Occurrence 0 x 2 x           Lines/Drains/Airways     Line                 Pacer Wires 05/31/19 1530 2 days          Peripheral Intravenous Line                 Peripheral IV - Single Lumen 05/29/19 0820 20 G Left Forearm 4 days         Midline Catheter Insertion/Assessment  - Single Lumen 06/01/19 0920 Left brachial vein 18g x 10cm 1 day                Physical Exam   Constitutional: He is oriented to person, place, and time. He appears well-developed and well-nourished. No distress.   HENT:   Head: Normocephalic and atraumatic.   Eyes: Pupils are equal, round, and reactive to light. EOM are normal.   Neck: Normal range of motion. Neck supple. No JVD present.   Cardiovascular: Normal rate, regular rhythm, normal heart sounds and intact distal pulses. Exam reveals no gallop and no friction rub.   No murmur heard.  Hemodynamically stable. NSR paced AAI.    Pulmonary/Chest: Effort normal and breath sounds normal. No stridor. No respiratory distress. He has no wheezes. He has no rales. He exhibits tenderness.   Abdominal: Soft. Bowel sounds are normal. He exhibits no distension. There is no tenderness. There is no guarding.   Musculoskeletal: Normal range of motion. He exhibits edema. He exhibits no tenderness or deformity.   2+ pitting edema to the TOD LE. S/P EVH to the TOD LE. ROM 5/5 TOD LE. Strength 5/5 TOD LE. + dorsiflexion, + plantar flexion to the TOD LE. No calf tenderness TOD.    Neurological: He is alert and oriented to person, place, and time. He displays normal reflexes. No cranial nerve deficit or sensory deficit. He exhibits normal muscle tone. Coordination normal.   Skin: Skin is warm and dry. Capillary refill takes less than 2 seconds. No rash noted. He is not diaphoretic. No erythema. No pallor.   Psychiatric: He has a normal mood and affect. His behavior is normal.  Judgment and thought content normal.   Nursing note and vitals reviewed.      Significant Labs:  BMP:   Recent Labs   Lab 06/03/19  0426   GLU 94   *   K 3.9   CL 94*   CO2 30*   BUN 26*   CREATININE 0.9   CALCIUM 8.7   MG 2.1     CBC:   Recent Labs   Lab 06/02/19  0405   WBC 22.28*   RBC 3.26*   HGB 9.7*   HCT 27.8*   *   MCV 85   MCH 29.8   MCHC 34.9       Significant Diagnostics:  I have reviewed all pertinent imaging results/findings within the past 24 hours.

## 2019-06-03 NOTE — PLAN OF CARE
Problem: Adult Inpatient Plan of Care  Goal: Plan of Care Review  Outcome: Ongoing (interventions implemented as appropriate)    Recommendations    Recommendation: 1. Continue current diet and ONS while intake remains poor (<75%) 2.Will continue to monitor  Intervention: Heart Healthy Diet Edcuation  Goals: Meet >85% EEN/EPN while admitted  Nutrition Goal Status: continues  Communication of RD Recs: POC, sticky note

## 2019-06-03 NOTE — ASSESSMENT & PLAN NOTE
06/01/2019  The patient is post-operative day 1, status-post coronary artery bypass grafting x 5.  Overall the patient is doing well.    Neuro:  The patient is awake alert and oriented x3.  Neuro exam is nonfocal.  Pain is well controlled. D/C fentanyl.   Cardiac:  Patient is off all gtts.  Excellent cardiac index.  Patient will be started on metoprolol.   Respiratory: Good sats on nasal cannula.  Continue respiratory toilet, continue incentive spirometer.  GI:  Advance diet as tolerated. Cardiac diet.    Renal:  Good urine output. Cr 1.1. K 3.8. K replaced. Mag 2.6.  Heme:  Hct 20.  Plt 124. Administer 2 units PRBC. Continue Aspirin and Plavix.   Id:  White count is 14. Tmax 100.8  Suspect secondary to stress of surgery. Will continue to follow.  Endocrine:  Glucose is controlled with insulin gtt.  Discontinue insulin gtt. Start sliding scale insulin.   Activities:  Patient is out of bed to chair.  Advance activities as tolerated.  Lines tubes and drains:  Discontinue [place] and [place].  Discontinue A-line. Discontinue cordero catheter. Discontinue AV x2. Continue chest tubes x 4 for now.  Continue pacer wires. Start midline.          06/02/2019  The patient is post-operative day 2, status-post coronary artery bypass grafting x 5.  Overall the patient is doing well. Anticipate transfer to telemetry today.    Neuro:  The patient is awake alert and oriented x3.  Neuro exam is nonfocal.  Pain is well controlled. D/C oxycodone. Start tramadol.   Cardiac: Hemodynamically stable. Continue metoprolol at current dose.   Respiratory: Good sats on nasal cannula.  Continue respiratory toilet, continue incentive spirometer. Wean to room air.   GI:  Advance diet as tolerated. Cardiac diet.    Renal:  Good urine output. Cr 1.0. K 5.0. Hold K today. Mag 2.4. Once dose of Metolazone. Repeat BMP at 1300.    Heme:  Hct 27.8.  Plt 114. Continue Aspirin and Plavix.   Id:  White count is 22. Tmax 100.4  Suspect secondary to stress of  surgery. Will continue to follow.  Endocrine:  Glucose is well controlled with sliding scale insulin.   Activities:  Patient is out of bed to chair.  Advance activities as tolerated.  Lines tubes and drains: Discontinue chest tubes x4. Discontinue cordero catheter. Continue pacer wires. Continue midline.  Plan: Anticipate transfer to telemetry today.          06/03/2019  The patient is post-operative day 3, status-post coronary artery bypass grafting x 5.  Overall the patient is doing well. Anticipate discharge to home health in 24-48 hours.     Neuro:  The patient is awake alert and oriented x3.  Neuro exam is nonfocal.  Pain is well controlled.   Cardiac: Hemodynamically stable. Continue metoprolol at current dose.   Respiratory: Good sats on nasal cannula.  Continue respiratory toilet, continue incentive spirometer. Wean to room air.   GI:  Advance diet as tolerated. Cardiac diet. Regular BM x2 yesterday.   Renal:  Good urine output. Cr 0.9. K 3.9. PO Lasix BID, PO K BID.   Heme:  CBC pending. Continue Aspirin and Plavix.   Id:  CBC pending. Tmax 99.8. ID on board r/t increased WBC. On linezolid and aztreonam. Will continue to follow.  Endocrine:  Glucose is well controlled with sliding scale insulin.   Activities:  Patient is out of bed to chair.  Advance activities as tolerated. Increase PT. Increase ambulation.   Lines tubes and drains: Continue pacer wires. Continue midline.  Plan: Anticipate discharge to home health in 24-48 hours.

## 2019-06-03 NOTE — PLAN OF CARE
06/03/19 1530   Medicare Message   Important Message from Medicare regarding Discharge Appeal Rights Given to patient/caregiver;Signed/date by patient/caregiver;Explained to patient/caregiver   Date IMM was signed 06/03/19   Time IMM was signed [id number]

## 2019-06-03 NOTE — PLAN OF CARE
Consult received for home health. Met with patient and spouse. Discussed options for receiving home health services. Reviewed laminated list of HH agencies. Preference letter obtained for [facility]. Referral placed in [facility] to [facility]. IMM reviewed and signed.       06/03/19 1544   Post-Acute Status   Post-Acute Authorization Home Health/Hospice   Home Health/Hospice Status Referrals Sent

## 2019-06-03 NOTE — PT/OT/SLP PROGRESS
Physical Therapy  Treatment    Chapin Calderon   MRN: [id number]   Admitting Diagnosis: NSTEMI (non-ST elevated myocardial infarction)    PT Received On: 06/03/19  PT Start Time: 0910     PT Stop Time: 0935    PT Total Time (min): 25 min       Billable Minutes:  Gait Training 15 and Therapeutic Exercise 10    Treatment Type: Treatment  PT/PTA: PT      General Precautions: Standard, fall, sternal  Orthopedic Precautions: N/A   Braces: N/A    Subjective:  Communicated with NURSE GONZALES prior to session.  Pain/Comfort  Pain Rating 1: 0/10    Objective:   Patient found with: peripheral IV, telemetry, oxygen(EXT. PACER)    Functional Mobility:  Therapeutic Activities and Exercises:  PT WITH GOOD PARTICIPATION, SUP>SIT WITH LIONEL, SEATED SCOOT TO EOB WITH CGA, PT ABLE TO RECITE 2/5 STERNAL PRECAUTIONS SO ALL REVIEWED WITH PT, HANDOUT ISSUED FOR HOME USE, SIT>STAND WITH CGA, PT ' WITH CGA, NO LOB OR SOB WITH O2 IN TOW, PT RETURN TO ROOM TO BEDSIDE CHAIR, PT EDUCATED IN AND PERFORMED BLE THEREX X 20 REPS AROM WITH REST    AM-PAC 6 CLICK MOBILITY  How much help from another person does this patient currently need?   1 = Unable, Total/Dependent Assistance  2 = A lot, Maximum/Moderate Assistance  3 = A little, Minimum/Contact Guard/Supervision  4 = None, Modified [place]/Independent    Turning over in bed (including adjusting bedclothes, sheets and blankets)?: 3  Sitting down on and standing up from a chair with arms (e.g., wheelchair, bedside commode, etc.): 3  Moving from lying on back to sitting on the side of the bed?: 3  Moving to and from a bed to a chair (including a wheelchair)?: 3  Need to walk in hospital room?: 3  Climbing 3-5 steps with a railing?: 1  Basic Mobility Total Score: 16    AM-PAC Raw Score CMS G-Code Modifier Level of Impairment Assistance   6 % Total / Unable   7 - 9 CM 80 - 100% Maximal Assist   10 - 14 CL 60 - 80% Moderate Assist   15 - 19 CK 40 - 60% Moderate Assist   20 - 22 CJ 20 -  40% Minimal Assist   23 CI 1-20% SBA / CGA   24 CH 0% Independent/ Mod I     Patient left up in chair with all lines intact, call button in reach, chair alarm on and NURSE notified.    Assessment:  Chapin Calderon is a 73 y.o. male with a medical diagnosis of NSTEMI (non-ST elevated myocardial infarction) and presents with IMPAIRED FUNCTIONAL MOBILITY. PT WILL BENEFIT FROM CONT. SKILLED P.T. TO ADDRESS IMPAIRMENTS    Rehab identified problem list/impairments: Rehab identified problem list/impairments: weakness, impaired functional mobilty, gait instability, impaired endurance, impaired balance, decreased safety awareness, decreased coordination    Rehab potential is good.    Activity tolerance: Good    Discharge recommendations: Discharge Facility/Level of Care Needs: home health PT     Barriers to discharge:      Equipment recommendations: Equipment Needed After Discharge: tub bench     GOALS:   Multidisciplinary Problems     Physical Therapy Goals        Problem: Physical Therapy Goal    Goal Priority Disciplines Outcome Goal Variances Interventions   Physical Therapy Goal     PT, PT/OT Ongoing (interventions implemented as appropriate)     Description:  1. Patient will perform sup to/from sit cga  2. Patient will perform sit to/from stand sba to prep amb  3. Patient will amb >300ft sba no gross LOB                    PLAN:    Patient to be seen 5 x/week  to address the above listed problems via gait training, therapeutic activities, therapeutic exercises  Plan of Care expires: 06/08/19  Plan of Care reviewed with: patient    PT ENCOURAGED TO CALL FOR ASSISTANCE WITH ALL NEEDS DUE TO FALL RISK STATUS, PT AGREEABLE    Damaris Byers, PT  06/03/2019

## 2019-06-03 NOTE — PROGRESS NOTES
[facility] -   Cardiology  Progress Note    Patient Name: Chapin Calderon  MRN: [id number]  Admission Date: 5/29/2019  Hospital Length of Stay: 5 days  Code Status: Full Code   Attending Physician: Mani Dorado MD   Primary Care Physician: Tiffanie Vicente MD  Expected Discharge Date: 6/7/2019  Principal Problem:NSTEMI (non-ST elevated myocardial infarction)    Subjective:   HPI:  Pt c/o chest pain, mid chest, all day yesterday which prompted him to go to ER overnight.  Cp much improved now, seems mostly resolved.  ecg shows NSR, low voltage, possible septal infarct.  Troponin 6.2 on initial labs.   .  No prior h/o CAD.  Active male with no significant health issues otherwise per pt.    Hospital Course:   5/30/19--Patient seen and examined in room, lying in bed. Denies chest pain or SOB today. IV heparin gtt in place. Plans for CABG tomorrow per CT surgery. Labs reviewed, K+ 4.5, Cr 0.8, Mag 1.9.     5/31/19--Patient seen and examined in ICU, intubated and sedated post CABG. CABG x 5V per Dr. Dorado today. Labs reviewed, INR 1.2, K+ 4.2, Cr 1.0, Mag 3.8, H/H 9/25.1.     6/1/19:  PT SEEN AND EXAMINED IN ICU.  EXTUBATED.  NO ANGINA.  USUAL POST OP DISCOMFORT.  NO DYSPNEA.  LABS SHOWS SEVERE POST OP ANEMIA.  RECEIVING PRBC THIS AM.    6/2/19:  PT SEEN AND EXAMINED IN ICU.  NO ACUTE CV ISSUES NOTED.  CHEST TUBES BEING PULLED THIS AM.  HG IMPROVED. NO ACTIVE ANGINA OR CHF SXS. APPROPRIATE POST OP PAIN.    6/3/19-Patient seen and examined today. Feels well. Ambulated this AM. No dmitry chest pain. Labs reviewed, WBC elevated.         Review of Systems   Constitution: Positive for malaise/fatigue.   HENT: Negative.    Eyes: Negative.    Cardiovascular: Negative.    Respiratory: Negative.    Endocrine: Negative.    Hematologic/Lymphatic: Negative.    Skin: Negative.    Gastrointestinal: Negative.    Genitourinary: Negative.    Neurological: Negative.    Psychiatric/Behavioral: Negative.     Allergic/Immunologic: Negative.      Objective:     Vital Signs (Most Recent):  Temp: 99.1 °F (37.3 °C) (06/03/19 0753)  Pulse: 91 (06/03/19 0753)  Resp: 18 (06/03/19 0753)  BP: (!) 104/57 (06/03/19 0753)  SpO2: 97 % (06/03/19 0753) Vital Signs (24h Range):  Temp:  [98.4 °F (36.9 °C)-99.8 °F (37.7 °C)] 99.1 °F (37.3 °C)  Pulse:  [89-91] 91  Resp:  [15-31] 18  SpO2:  [95 %-98 %] 97 %  BP: ()/(44-62) 104/57     Weight: 78.9 kg (173 lb 15.1 oz)  Body mass index is 27.24 kg/m².     SpO2: 97 %  O2 Device (Oxygen Therapy): nasal cannula      Intake/Output Summary (Last 24 hours) at 6/3/2019 1204  Last data filed at 6/2/2019 2105  Gross per 24 hour   Intake 250 ml   Output 620 ml   Net -370 ml       Lines/Drains/Airways     Line                 Pacer Wires 05/31/19 1530 2 days          Peripheral Intravenous Line                 Peripheral IV - Single Lumen 05/29/19 0820 20 G Left Forearm 5 days                Physical Exam   Constitutional: He is oriented to person, place, and time. He appears well-developed and well-nourished. No distress.   HENT:   Head: Normocephalic and atraumatic.   Eyes: Pupils are equal, round, and reactive to light. Right eye exhibits no discharge. Left eye exhibits no discharge.   Neck: Neck supple. No JVD present.   Cardiovascular: Normal rate, regular rhythm, S1 normal, S2 normal and normal heart sounds.   No murmur heard.  Pulmonary/Chest: Effort normal. No respiratory distress.   CABG site C/D/I; dressed    Diminished BS at bases   Abdominal: Soft. He exhibits no distension.   Musculoskeletal: He exhibits edema (BLE).   Neurological: He is alert and oriented to person, place, and time.   Skin: Skin is warm and dry. He is not diaphoretic. No erythema.   Psychiatric: He has a normal mood and affect. His behavior is normal. Thought content normal.   Nursing note and vitals reviewed.      Significant Labs:   CMP   Recent Labs   Lab 06/02/19  1323 06/02/19  1703 06/03/19  0426   *  132* 131*   K 4.8 4.1 3.9   CL 97 95 94*   CO2 29 28 30*   * 121* 94   BUN 27* 27* 26*   CREATININE 1.0 1.0 0.9   CALCIUM 9.1 9.1 8.7   ANIONGAP 8 9 7*   ESTGFRAFRICA >60 >60 >60   EGFRNONAA >60 >60 >60   , CBC   Recent Labs   Lab 06/01/19  1810 06/02/19  0405 06/03/19  1044   WBC 21.73* 22.28* 17.47*   HGB 10.3* 9.7* 9.0*   HCT 29.2* 27.8* 26.4*   * 114* 134*   , Troponin No results for input(s): TROPONINI in the last 48 hours. and All pertinent lab results from the last 24 hours have been reviewed.    Significant Imaging: Echocardiogram:   2D echo with color flow doppler:   Results for orders placed or performed during the hospital encounter of 05/29/19   2D echo with color flow doppler   Result Value Ref Range    QEF 60 55 - 65    Diastolic Dysfunction No     Est. PA Systolic Pressure 30.46    , EKG: Reviewed and X-Ray: CXR: X-Ray Chest 1 View (CXR): No results found for this visit on 05/29/19. and X-Ray Chest PA and Lateral (CXR): No results found for this visit on 05/29/19.    Assessment and Plan:   Patient recovering s/p CABG. Stable CV wise. Continue same meds. Will follow along.    Anemia following surgery  POST OP RELATED ANEMIA.  PRBC TRANSFUSION 6/1/19.    CAD, multiple vessel  -Recovering s/p CABG  -Continue    S/P CABG x 5  CABG x 5V per CT surgery  Intubated, sedated in ICU today post surgery  Mgmt per CT surgery  Continue ASA, Statin, BB  Resume ARB once OK with CT surgery  Will continue to follow along    6/3/19  -Stable overnight  -Continue ASA, statin, BB  -Resume ARB when ok with CVT  -Ambulation and IS usage    Coronary artery disease involving native coronary artery of native heart with angina pectoris  -See plan for s/p CABG    AP (angina pectoris)  See management plan detailed above.     Abnormal ECG  See management plan detailed above.         VTE Risk Mitigation (From admission, onward)        Ordered     IP VTE LOW RISK PATIENT  Once      05/30/19 0752     heparin 25,000 units  in dextrose 5% 250 mL (100 units/mL) infusion LOW INTENSITY nomogram - [facility]  Continuous      05/29/19 2001     Reason for no Mechanical VTE Prophylaxis  Once      05/29/19 1431          Denise Lugo PA-C  Cardiology  [facility] -     Chart reviewed. Dr. Santoyo examined patient and agrees with plan as outlined above.

## 2019-06-03 NOTE — SUBJECTIVE & OBJECTIVE
Review of Systems   Constitution: Positive for malaise/fatigue.   HENT: Negative.    Eyes: Negative.    Cardiovascular: Negative.    Respiratory: Negative.    Endocrine: Negative.    Hematologic/Lymphatic: Negative.    Skin: Negative.    Gastrointestinal: Negative.    Genitourinary: Negative.    Neurological: Negative.    Psychiatric/Behavioral: Negative.    Allergic/Immunologic: Negative.      Objective:     Vital Signs (Most Recent):  Temp: 99.1 °F (37.3 °C) (06/03/19 0753)  Pulse: 91 (06/03/19 0753)  Resp: 18 (06/03/19 0753)  BP: (!) 104/57 (06/03/19 0753)  SpO2: 97 % (06/03/19 0753) Vital Signs (24h Range):  Temp:  [98.4 °F (36.9 °C)-99.8 °F (37.7 °C)] 99.1 °F (37.3 °C)  Pulse:  [89-91] 91  Resp:  [15-31] 18  SpO2:  [95 %-98 %] 97 %  BP: ()/(44-62) 104/57     Weight: 78.9 kg (173 lb 15.1 oz)  Body mass index is 27.24 kg/m².     SpO2: 97 %  O2 Device (Oxygen Therapy): nasal cannula      Intake/Output Summary (Last 24 hours) at 6/3/2019 1204  Last data filed at 6/2/2019 2105  Gross per 24 hour   Intake 250 ml   Output 620 ml   Net -370 ml       Lines/Drains/Airways     Line                 Pacer Wires 05/31/19 1530 2 days          Peripheral Intravenous Line                 Peripheral IV - Single Lumen 05/29/19 0820 20 G Left Forearm 5 days                Physical Exam   Constitutional: He is oriented to person, place, and time. He appears well-developed and well-nourished. No distress.   HENT:   Head: Normocephalic and atraumatic.   Eyes: Pupils are equal, round, and reactive to light. Right eye exhibits no discharge. Left eye exhibits no discharge.   Neck: Neck supple. No JVD present.   Cardiovascular: Normal rate, regular rhythm, S1 normal, S2 normal and normal heart sounds.   No murmur heard.  Pulmonary/Chest: Effort normal. No respiratory distress.   CABG site C/D/I; dressed    Diminished BS at bases   Abdominal: Soft. He exhibits no distension.   Musculoskeletal: He exhibits edema (BLE).    Neurological: He is alert and oriented to person, place, and time.   Skin: Skin is warm and dry. He is not diaphoretic. No erythema.   Psychiatric: He has a normal mood and affect. His behavior is normal. Thought content normal.   Nursing note and vitals reviewed.      Significant Labs:   CMP   Recent Labs   Lab 06/02/19  1323 06/02/19  1703 06/03/19  0426   * 132* 131*   K 4.8 4.1 3.9   CL 97 95 94*   CO2 29 28 30*   * 121* 94   BUN 27* 27* 26*   CREATININE 1.0 1.0 0.9   CALCIUM 9.1 9.1 8.7   ANIONGAP 8 9 7*   ESTGFRAFRICA >60 >60 >60   EGFRNONAA >60 >60 >60   , CBC   Recent Labs   Lab 06/01/19  1810 06/02/19  0405 06/03/19  1044   WBC 21.73* 22.28* 17.47*   HGB 10.3* 9.7* 9.0*   HCT 29.2* 27.8* 26.4*   * 114* 134*   , Troponin No results for input(s): TROPONINI in the last 48 hours. and All pertinent lab results from the last 24 hours have been reviewed.    Significant Imaging: Echocardiogram:   2D echo with color flow doppler:   Results for orders placed or performed during the hospital encounter of 05/29/19   2D echo with color flow doppler   Result Value Ref Range    QEF 60 55 - 65    Diastolic Dysfunction No     Est. PA Systolic Pressure 30.46    , EKG: Reviewed and X-Ray: CXR: X-Ray Chest 1 View (CXR): No results found for this visit on 05/29/19. and X-Ray Chest PA and Lateral (CXR): No results found for this visit on 05/29/19.

## 2019-06-03 NOTE — PLAN OF CARE
Problem: Adult Inpatient Plan of Care  Goal: Plan of Care Review  Outcome: Ongoing (interventions implemented as appropriate)  Patient had uneventful shift. Patient awake, alert and oriented x 4. VS stable. Patient denies pain or SOB. Patient on telemetry, NSR on the monitor. Patient ambulates with assistance. Fall precautions in place. Patient free from fall/injury. Plan of care reviewed with patient. Patient has no questions at this time. Will continue to monitor.

## 2019-06-03 NOTE — PT/OT/SLP PROGRESS
Physical Therapy  Treatment    Chapin Calderon   MRN: [id number]   Admitting Diagnosis: NSTEMI (non-ST elevated myocardial infarction)    PT Received On: 06/03/19  PT Start Time: 1320     PT Stop Time: 1400    PT Total Time (min): 40 min       Billable Minutes:  Gait Training 15, Therapeutic Activity 10 and Therapeutic Exercise 15    Treatment Type: Treatment  PT/PTA: PTA     PTA Visit Number: 1       General Precautions: Standard, fall, sternal  Orthopedic Precautions: N/A   Braces: N/A    Spiritual, Cultural Beliefs, Church Practices, Values that Affect Care: no    Subjective:  Communicated with NURSETRACY AND Epic CHART REVIEW prior to session.  PATIENT AGREE TO TX NOW.    Pain/Comfort  Pain Rating 1: 0/10    Objective:   Patient found with: peripheral IV, telemetry, oxygen, SUPINE IN BED. ASSISTED PATIENT OOB T/FS, ASSISTED PATIENT TO BATH ROOM FOR TOLIETING , GT IN HALLWAY , TOD LE EXERCISE INSTRUCTION, STERNAL PERCAUTIONS REVIEWED WITH RETURN DEMONSTRATE.     Functional Mobility:  Bed Mobility:    SUPINE TO SIT AT MIN ASSIST (PATIENT UTILIZED HOB BEING RAISED )    Transfers:   SIT TO STAND, STAND TO SIT AT CGAX1.    Gait:    AMBUALTE 100'X2, GOOD STEADY PACE , AT CGAX1, PATIENT STEADY ON HIS FEET.    Stairs:N/A    Balance:   Static Sit: GOOD-: Takes MODERATE challenges from all directions but inconsistently  Dynamic Sit: GOOD-: Incosistently Maintains balance through MODERATE excursions of active trunk movement,     Static Stand: GOOD-: Takes MODERATE challenges from all directions inconsistently  Dynamic stand: FAIR: Needs CONTACT GUARD during gait     Therapeutic Activities and Exercises:  TOD LE EXERCISE INSTRUCTION, OOB T/FS , GT ACTIVITY, STERNAL PERCAUTION REVIEW, TOLIETING ACTIVITY.    AM-PAC 6 CLICK MOBILITY  How much help from another person does this patient currently need?   1 = Unable, Total/Dependent Assistance  2 = A lot, Maximum/Moderate Assistance  3 = A little, Minimum/Contact  Guard/Supervision  4 = None, Modified [place]/Independent    Turning over in bed (including adjusting bedclothes, sheets and blankets)?: 3  Sitting down on and standing up from a chair with arms (e.g., wheelchair, bedside commode, etc.): 4  Moving from lying on back to sitting on the side of the bed?: 3  Moving to and from a bed to a chair (including a wheelchair)?: 3  Need to walk in hospital room?: 3  Climbing 3-5 steps with a railing?: 1  Basic Mobility Total Score: 17    AM-PAC Raw Score CMS G-Code Modifier Level of Impairment Assistance   6 % Total / Unable   7 - 9 CM 80 - 100% Maximal Assist   10 - 14 CL 60 - 80% Moderate Assist   15 - 19 CK 40 - 60% Moderate Assist   20 - 22 CJ 20 - 40% Minimal Assist   23 CI 1-20% SBA / CGA   24 CH 0% Independent/ Mod I     Patient left supine with all lines intact, call button in reach and bed alarm on.    Assessment:  Chapin Calderon is a 73 y.o. male with a medical diagnosis of NSTEMI (non-ST elevated myocardial infarction) . PATIENT PARTICIPATING WELL WITH ALL THERAPEUTIC ACTIVITIES. PATIENT STEADY ON HIS FEET, APPEAR MOTIVATED TO INCREASE ACTIVITY AS TOLERATED.    Rehab identified problem list/impairments: Rehab identified problem list/impairments: weakness, impaired self care skills, impaired balance, impaired endurance, impaired functional mobilty, decreased upper extremity function, gait instability    Rehab potential is excellent.    Activity tolerance: Excellent    Discharge recommendations: Discharge Facility/Level of Care Needs: home health PT, home health OT     Barriers to discharge:      Equipment recommendations: Equipment Needed After Discharge: tub bench     GOALS:   Multidisciplinary Problems     Physical Therapy Goals        Problem: Physical Therapy Goal    Goal Priority Disciplines Outcome Goal Variances Interventions   Physical Therapy Goal     PT, PT/OT Ongoing (interventions implemented as appropriate)     Description:  1. Patient will  perform sup to/from sit cga  2. Patient will perform sit to/from stand sba to prep amb  3. Patient will amb >300ft sba no gross LOB                    PLAN:    Patient to be seen 5 x/week  to address the above listed problems via gait training, therapeutic activities, therapeutic exercises  Plan of Care expires: 06/08/19  Plan of Care reviewed with: patient         Tiffanie Davis, PTA  06/03/2019

## 2019-06-04 LAB
ANION GAP SERPL CALC-SCNC: 9 MMOL/L (ref 8–16)
BASOPHILS # BLD AUTO: 0.01 K/UL (ref 0–0.2)
BASOPHILS NFR BLD: 0.1 % (ref 0–1.9)
BUN SERPL-MCNC: 23 MG/DL (ref 8–23)
CALCIUM SERPL-MCNC: 8.8 MG/DL (ref 8.7–10.5)
CHLORIDE SERPL-SCNC: 92 MMOL/L (ref 95–110)
CO2 SERPL-SCNC: 31 MMOL/L (ref 23–29)
CREAT SERPL-MCNC: 0.9 MG/DL (ref 0.5–1.4)
DIFFERENTIAL METHOD: ABNORMAL
EOSINOPHIL # BLD AUTO: 0.1 K/UL (ref 0–0.5)
EOSINOPHIL NFR BLD: 1 % (ref 0–8)
ERYTHROCYTE [DISTWIDTH] IN BLOOD BY AUTOMATED COUNT: 13.7 % (ref 11.5–14.5)
EST. GFR  (AFRICAN AMERICAN): >60 ML/MIN/1.73 M^2
EST. GFR  (NON AFRICAN AMERICAN): >60 ML/MIN/1.73 M^2
GLUCOSE SERPL-MCNC: 101 MG/DL (ref 70–110)
HCT VFR BLD AUTO: 25.5 % (ref 40–54)
HGB BLD-MCNC: 9 G/DL (ref 14–18)
LYMPHOCYTES # BLD AUTO: 1.5 K/UL (ref 1–4.8)
LYMPHOCYTES NFR BLD: 12.1 % (ref 18–48)
MAGNESIUM SERPL-MCNC: 1.7 MG/DL (ref 1.6–2.6)
MCH RBC QN AUTO: 30.4 PG (ref 27–31)
MCHC RBC AUTO-ENTMCNC: 35.3 G/DL (ref 32–36)
MCV RBC AUTO: 86 FL (ref 82–98)
MONOCYTES # BLD AUTO: 1.6 K/UL (ref 0.3–1)
MONOCYTES NFR BLD: 12.8 % (ref 4–15)
NEUTROPHILS # BLD AUTO: 9.2 K/UL (ref 1.8–7.7)
NEUTROPHILS NFR BLD: 74 % (ref 38–73)
PHOSPHATE SERPL-MCNC: 2.3 MG/DL (ref 2.7–4.5)
PLATELET # BLD AUTO: 159 K/UL (ref 150–350)
PMV BLD AUTO: 9.2 FL (ref 9.2–12.9)
POCT GLUCOSE: 135 MG/DL (ref 70–110)
POTASSIUM SERPL-SCNC: 3.6 MMOL/L (ref 3.5–5.1)
RBC # BLD AUTO: 2.96 M/UL (ref 4.6–6.2)
SODIUM SERPL-SCNC: 132 MMOL/L (ref 136–145)
WBC # BLD AUTO: 12.47 K/UL (ref 3.9–12.7)

## 2019-06-04 PROCEDURE — 84100 ASSAY OF PHOSPHORUS: CPT

## 2019-06-04 PROCEDURE — 21400001 HC TELEMETRY ROOM

## 2019-06-04 PROCEDURE — 25000003 PHARM REV CODE 250: Performed by: INTERNAL MEDICINE

## 2019-06-04 PROCEDURE — 83735 ASSAY OF MAGNESIUM: CPT

## 2019-06-04 PROCEDURE — 97535 SELF CARE MNGMENT TRAINING: CPT | Performed by: PHYSICAL THERAPIST

## 2019-06-04 PROCEDURE — 36415 COLL VENOUS BLD VENIPUNCTURE: CPT

## 2019-06-04 PROCEDURE — 25000003 PHARM REV CODE 250: Performed by: NURSE PRACTITIONER

## 2019-06-04 PROCEDURE — 99232 PR SUBSEQUENT HOSPITAL CARE,LEVL II: ICD-10-PCS | Mod: ,,, | Performed by: INTERNAL MEDICINE

## 2019-06-04 PROCEDURE — 97530 THERAPEUTIC ACTIVITIES: CPT

## 2019-06-04 PROCEDURE — S0073 INJECTION, AZTREONAM, 500 MG: HCPCS | Performed by: INTERNAL MEDICINE

## 2019-06-04 PROCEDURE — 99232 SBSQ HOSP IP/OBS MODERATE 35: CPT | Mod: ,,, | Performed by: INTERNAL MEDICINE

## 2019-06-04 PROCEDURE — 97116 GAIT TRAINING THERAPY: CPT

## 2019-06-04 PROCEDURE — 85025 COMPLETE CBC W/AUTO DIFF WBC: CPT

## 2019-06-04 PROCEDURE — 97530 THERAPEUTIC ACTIVITIES: CPT | Performed by: PHYSICAL THERAPIST

## 2019-06-04 PROCEDURE — 80048 BASIC METABOLIC PNL TOTAL CA: CPT

## 2019-06-04 PROCEDURE — 63600175 PHARM REV CODE 636 W HCPCS: Performed by: NURSE PRACTITIONER

## 2019-06-04 PROCEDURE — 94761 N-INVAS EAR/PLS OXIMETRY MLT: CPT

## 2019-06-04 RX ORDER — METOPROLOL TARTRATE 25 MG/1
12.5 TABLET ORAL 2 TIMES DAILY
Status: DISCONTINUED | OUTPATIENT
Start: 2019-06-04 | End: 2019-06-05 | Stop reason: HOSPADM

## 2019-06-04 RX ORDER — FUROSEMIDE 10 MG/ML
40 INJECTION INTRAMUSCULAR; INTRAVENOUS DAILY
Status: DISCONTINUED | OUTPATIENT
Start: 2019-06-05 | End: 2019-06-05

## 2019-06-04 RX ORDER — MAGNESIUM SULFATE HEPTAHYDRATE 40 MG/ML
2 INJECTION, SOLUTION INTRAVENOUS ONCE
Status: COMPLETED | OUTPATIENT
Start: 2019-06-04 | End: 2019-06-04

## 2019-06-04 RX ORDER — METOPROLOL TARTRATE 25 MG/1
12.5 TABLET ORAL 2 TIMES DAILY
Status: DISCONTINUED | OUTPATIENT
Start: 2019-06-04 | End: 2019-06-04

## 2019-06-04 RX ORDER — POTASSIUM CHLORIDE 20 MEQ/1
40 TABLET, EXTENDED RELEASE ORAL ONCE
Status: COMPLETED | OUTPATIENT
Start: 2019-06-04 | End: 2019-06-04

## 2019-06-04 RX ORDER — SODIUM CHLORIDE, SODIUM LACTATE, POTASSIUM CHLORIDE, CALCIUM CHLORIDE 600; 310; 30; 20 MG/100ML; MG/100ML; MG/100ML; MG/100ML
INJECTION, SOLUTION INTRAVENOUS CONTINUOUS
Status: ACTIVE | OUTPATIENT
Start: 2019-06-04 | End: 2019-06-04

## 2019-06-04 RX ADMIN — LINEZOLID 600 MG: 600 TABLET, FILM COATED ORAL at 11:06

## 2019-06-04 RX ADMIN — POTASSIUM CHLORIDE 40 MEQ: 1500 TABLET, EXTENDED RELEASE ORAL at 10:06

## 2019-06-04 RX ADMIN — MAGNESIUM SULFATE IN WATER 2 G: 40 INJECTION, SOLUTION INTRAVENOUS at 10:06

## 2019-06-04 RX ADMIN — MAGNESIUM SULFATE IN WATER 2 G: 40 INJECTION, SOLUTION INTRAVENOUS at 05:06

## 2019-06-04 RX ADMIN — CHLORHEXIDINE GLUCONATE 0.12% ORAL RINSE 10 ML: 1.2 LIQUID ORAL at 10:06

## 2019-06-04 RX ADMIN — SODIUM CHLORIDE, SODIUM LACTATE, POTASSIUM CHLORIDE, AND CALCIUM CHLORIDE: .6; .31; .03; .02 INJECTION, SOLUTION INTRAVENOUS at 10:06

## 2019-06-04 RX ADMIN — AZTREONAM 1000 MG: 1 INJECTION, POWDER, LYOPHILIZED, FOR SOLUTION INTRAMUSCULAR; INTRAVENOUS at 11:06

## 2019-06-04 RX ADMIN — PANTOPRAZOLE SODIUM 40 MG: 40 TABLET, DELAYED RELEASE ORAL at 10:06

## 2019-06-04 RX ADMIN — CYANOCOBALAMIN TAB 1000 MCG 1000 MCG: 1000 TAB at 10:06

## 2019-06-04 RX ADMIN — CHLORHEXIDINE GLUCONATE 0.12% ORAL RINSE 10 ML: 1.2 LIQUID ORAL at 11:06

## 2019-06-04 RX ADMIN — AZTREONAM 1000 MG: 1 INJECTION, POWDER, LYOPHILIZED, FOR SOLUTION INTRAMUSCULAR; INTRAVENOUS at 04:06

## 2019-06-04 RX ADMIN — ASPIRIN 81 MG: 81 TABLET, COATED ORAL at 10:06

## 2019-06-04 RX ADMIN — AZTREONAM 1000 MG: 1 INJECTION, POWDER, LYOPHILIZED, FOR SOLUTION INTRAMUSCULAR; INTRAVENOUS at 07:06

## 2019-06-04 RX ADMIN — LINEZOLID 600 MG: 600 TABLET, FILM COATED ORAL at 10:06

## 2019-06-04 RX ADMIN — FERROUS SULFATE TAB EC 325 MG (65 MG FE EQUIVALENT) 325 MG: 325 (65 FE) TABLET DELAYED RESPONSE at 10:06

## 2019-06-04 RX ADMIN — FOLIC ACID 1 MG: 1 TABLET ORAL at 10:06

## 2019-06-04 RX ADMIN — CLOPIDOGREL BISULFATE 75 MG: 75 TABLET ORAL at 10:06

## 2019-06-04 RX ADMIN — ATORVASTATIN CALCIUM 80 MG: 40 TABLET, FILM COATED ORAL at 10:06

## 2019-06-04 RX ADMIN — METOPROLOL TARTRATE 12.5 MG: 25 TABLET, FILM COATED ORAL at 11:06

## 2019-06-04 RX ADMIN — FERROUS SULFATE TAB EC 325 MG (65 MG FE EQUIVALENT) 325 MG: 325 (65 FE) TABLET DELAYED RESPONSE at 11:06

## 2019-06-04 NOTE — PT/OT/SLP PROGRESS
Physical Therapy  Treatment    Chapin Calderon   MRN: [id number]   Admitting Diagnosis: NSTEMI (non-ST elevated myocardial infarction)    PT Received On: 06/04/19  PT Start Time: 0900     PT Stop Time: 0925    PT Total Time (min): 25 min       Billable Minutes:  Gait Training 15 and Therapeutic Activity 10    Treatment Type: Treatment  PT/PTA: PT          General Precautions: Standard, sternal  Orthopedic Precautions: N/A   Braces: N/A    Subjective:  Communicated with NURSE GONZALES prior to session.  Pain/Comfort  Pain Rating 1: 0/10    Objective:   Patient found with: telemetry, peripheral IV    Functional Mobility:  Therapeutic Activities and Exercises:  PT FOUND SEATED IN CHAIR UPON ARRIVAL, SEATED SCOOT TO EDGE OF CHAIR WITH SPV, PT ABLE TO RECITE 4/5 STERNAL PRECAUTIONS SO ALL REVIEWED WITH PT, SIT>STAND WITH SPV, PT ASSISTED TO BATHROOM FOR TOILETING, SPV FOR TOILET TF, NO ASSIST FOR CLEANING, PT ' WITH SPV, NO LOB OR SOB ON ROOM AIR, PT RETURN TO ROOM TO BEDSIDE CHAIR, REVIEW BLE THEREX TO PERFORM WHILE SEATED IN CHAIR    AM-PAC 6 CLICK MOBILITY  How much help from another person does this patient currently need?   1 = Unable, Total/Dependent Assistance  2 = A lot, Maximum/Moderate Assistance  3 = A little, Minimum/Contact Guard/Supervision  4 = None, Modified [place]/Independent    Turning over in bed (including adjusting bedclothes, sheets and blankets)?: 4  Sitting down on and standing up from a chair with arms (e.g., wheelchair, bedside commode, etc.): 4  Moving from lying on back to sitting on the side of the bed?: 4  Moving to and from a bed to a chair (including a wheelchair)?: 4  Need to walk in hospital room?: 4  Climbing 3-5 steps with a railing?: 1  Basic Mobility Total Score: 21    AM-PAC Raw Score CMS G-Code Modifier Level of Impairment Assistance   6 % Total / Unable   7 - 9 CM 80 - 100% Maximal Assist   10 - 14 CL 60 - 80% Moderate Assist   15 - 19 CK 40 - 60% Moderate Assist    20 - 22 CJ 20 - 40% Minimal Assist   23 CI 1-20% SBA / CGA   24 CH 0% Independent/ Mod I     Patient left up in chair with all lines intact, call button in reach and NURSE notified.    Assessment:  Chapin Calderon is a 73 y.o. male with a medical diagnosis of NSTEMI (non-ST elevated myocardial infarction)   PT IS NO LONGER A CANDIDATE FOR INPATIENT SKILLED P.T. DUE TO HIGH LEVEL OF FUNCTION, PT WILL BENEFIT FROM [email or web address] 'S PROGRAM FOR CONT. GAIT/TE    Rehab identified problem list/impairments:     Rehab potential is     Activity tolerance:     Discharge recommendations: Discharge Facility/Level of Care Needs: home health PT     Barriers to discharge:      Equipment recommendations: Equipment Needed After Discharge: tub bench     GOALS:   Multidisciplinary Problems     Physical Therapy Goals     Not on file          Multidisciplinary Problems (Resolved)        Problem: Physical Therapy Goal    Goal Priority Disciplines Outcome Goal Variances Interventions   Physical Therapy Goal   (Resolved)     PT, PT/OT Outcome(s) achieved     Description:  1. Patient will perform sup to/from sit cga  2. Patient will perform sit to/from stand sba to prep amb  3. Patient will amb >300ft sba no gross LOB                    PLAN:    D/C PT FROM [facility]. [facility] TO [facility] PROGRAM     Damaris Byers, PT  06/04/2019

## 2019-06-04 NOTE — SUBJECTIVE & OBJECTIVE
Interval History: 06/03- remains weak, afebrile , wbc -17, CVT on board      Review of Systems   Constitutional: Negative.  Negative for chills and fever.   HENT: Negative.  Negative for congestion and sore throat.    Eyes: Negative.  Negative for visual disturbance.   Respiratory: Negative.  Negative for cough, shortness of breath and wheezing.    Cardiovascular: Negative for chest pain.   Gastrointestinal: Negative for abdominal pain, diarrhea, nausea and vomiting.   Endocrine: Negative.    Genitourinary: Negative.    Musculoskeletal: Negative.  Negative for myalgias and neck stiffness.   Skin: Negative.  Negative for color change and pallor.   Allergic/Immunologic: Negative.    Neurological: Negative.    Hematological: Negative.    Psychiatric/Behavioral: Negative.    All other systems reviewed and are negative.    Objective:     Vital Signs (Most Recent):  Temp: 99.6 °F (37.6 °C) (06/04/19 0552)  Pulse: 90 (06/04/19 0600)  Resp: 20 (06/04/19 0552)  BP: (!) 97/53 (06/04/19 0552)  SpO2: (!) 94 % (06/04/19 0700) Vital Signs (24h Range):  Temp:  [98 °F (36.7 °C)-100 °F (37.8 °C)] 99.6 °F (37.6 °C)  Pulse:  [90-91] 90  Resp:  [16-20] 20  SpO2:  [91 %-95 %] 94 %  BP: ()/(50-57) 97/53     Weight: 78.9 kg (173 lb 15.1 oz)  Body mass index is 27.24 kg/m².    Intake/Output Summary (Last 24 hours) at 6/4/2019 0858  Last data filed at 6/4/2019 0732  Gross per 24 hour   Intake 100 ml   Output 750 ml   Net -650 ml      Physical Exam   Constitutional: He is oriented to person, place, and time. He appears well-developed and well-nourished. No distress.   HENT:   Head: Normocephalic and atraumatic.   Eyes: Pupils are equal, round, and reactive to light. EOM are normal.   Neck: Normal range of motion. Neck supple. No JVD present.   Cardiovascular: Normal rate, regular rhythm, normal heart sounds and intact distal pulses. Exam reveals no gallop and no friction rub.   No murmur heard.  Paced AAI.    Pulmonary/Chest: Effort  normal. No stridor. No respiratory distress. He has no wheezes. He has no rales. He exhibits tenderness.   -    Abdominal: Soft. Bowel sounds are normal. He exhibits no distension. There is no tenderness. There is no guarding.   Musculoskeletal: Normal range of motion. He exhibits edema. He exhibits no tenderness or deformity.   1+ pitting edema to TOD LE, s/p EVH to TOD LE.    Neurological: He is alert and oriented to person, place, and time. He displays normal reflexes. No cranial nerve deficit or sensory deficit. He exhibits normal muscle tone. Coordination normal.   Skin: Skin is warm and dry. Capillary refill takes less than 2 seconds. No rash noted. He is not diaphoretic. No erythema. No pallor.   Psychiatric: He has a normal mood and affect. His behavior is normal. Judgment and thought content normal.   Nursing note and vitals reviewed.      Significant Labs: All pertinent labs within the past 24 hours have been reviewed.    Significant Imaging: I have reviewed and interpreted all pertinent imaging results/findings within the past 24 hours.

## 2019-06-04 NOTE — PHYSICIAN QUERY
"PT Name: Chapin Calderon  MR #: [id number]    Physician Query Form - Hematology Clarification      CDS/: Mirta Brown RN              Contact information: [phone]    This form is a permanent document in the medical record.      Query Date: June 4, 2019    By submitting this query, we are merely seeking further clarification of documentation. Please utilize your independent clinical judgment when addressing the question(s) below.    The Medical record contains the following:   Indicators  Supporting Clinical Findings Location in Medical Record   x "Anemia" documented LABS SHOWS SEVERE POST OP ANEMIA.  RECEIVING PRBC THIS AM.    Progress Note 6/1       Cardiology          x H & H =      Hgb= 14.3-> 8.0-> 7.3-> 10.4       Hct = 41.2->23--> 20 --> 29.4   Lab 5/31, 5/31, 6/1, 6/1   x BP =                     HR=      Vital Signs (24h Range):           Pulse:  [51-91] 91             BP: ()/(46-71) 100/55  Progress Note 5/31       Cardiology            "GI bleeding" documented     x Acute bleeding (Non GI site) Procedure(s) (LRB):    CORONARY ARTERY BYPASS GRAFT (CABG) (N/A)    ECHOCARDIOGRAM,TRANSESOPHAGEAL (N/A)    SURGICAL PROCUREMENT, VEIN, ENDOSCOPIC (Bilateral)        Estimated Blood Loss (EBL):  750 mL    Op Note 5/31   x Transfusion(s)      Transfuse  2 units PRBC Blood Bank 5/29      Treatment:      Other:        Doctor, please specify diagnosis or diagnoses associated with above clinical findings.    [ x ] Acute blood loss anemia expected post-operatively       [  ] Other Hematological Diagnosis (please specify):     [  ] Clinically Undetermined       Please document in your progress notes daily for the duration of treatment, until resolved, and include in your discharge summary.                                                                                                      "

## 2019-06-04 NOTE — PROGRESS NOTES
[facility] -   Cardiothoracic Surgery  Progress Note    Patient Name: Chapin Calderon  MRN: [id number]  Admission Date: 5/29/2019  Hospital Length of Stay: 6 days  Code Status: Full Code   Attending Physician: Mani Dorado MD   Referring Provider: Self, Aaareferral  Principal Problem:NSTEMI (non-ST elevated myocardial infarction)            Subjective:     Post-Op Info:  Procedure(s) (LRB):  CORONARY ARTERY BYPASS GRAFT (CABG) (N/A)  ECHOCARDIOGRAM,TRANSESOPHAGEAL (N/A)  SURGICAL PROCUREMENT, VEIN, ENDOSCOPIC (Bilateral)   4 Days Post-Op     Interval History:  06/04/2019  The patient is post-operative day 4, status-post coronary artery bypass grafting x 5.    ROS  Medications:  Continuous Infusions:   lactated ringers 100 mL/hr at 06/04/19 1009     Scheduled Meds:   aspirin  81 mg Oral Daily    atorvastatin  80 mg Oral Daily    aztreonam  1,000 mg Intravenous Q8H    chlorhexidine  10 mL Mouth/Throat BID    clopidogrel  75 mg Oral Daily    cyanocobalamin  1,000 mcg Oral Daily    ferrous sulfate  325 mg Oral BID    folic acid  1 mg Oral Daily    linezolid  600 mg Oral Q12H    magnesium sulfate IVPB  2 g Intravenous Once    metoprolol tartrate  12.5 mg Oral BID    nozaseptin   Each Nare BID    pantoprazole  40 mg Oral Daily    polyethylene glycol  17 g Oral Daily     PRN Meds:sodium chloride, acetaminophen, albumin human 5%, ceFAZolin (ANCEF) IVPB, dextrose 50%, dextrose 50%, glucagon (human recombinant), glucose, glucose, insulin aspart U-100, lactated ringers, magnesium sulfate IVPB, metoclopramide HCl, ondansetron, ondansetron, potassium chloride in water **AND** potassium chloride in water **AND** potassium chloride in water, traMADol     Objective:     Vital Signs (Most Recent):  Temp: 98 °F (36.7 °C) (06/04/19 1125)  Pulse: 78 (06/04/19 1125)  Resp: 16 (06/04/19 1125)  BP: (!) 99/52 (06/04/19 1125)  SpO2: (!) 94 % (06/04/19 1125) Vital Signs (24h Range):  Temp:  [97.6 °F (36.4  °C)-100 °F (37.8 °C)] 98 °F (36.7 °C)  Pulse:  [78-94] 78  Resp:  [16-20] 16  SpO2:  [91 %-96 %] 94 %  BP: ()/(50-57) 99/52     Weight: 78.9 kg (173 lb 15.1 oz)  Body mass index is 27.24 kg/m².    SpO2: (!) 94 %  O2 Device (Oxygen Therapy): room air    Intake/Output - Last 3 Shifts       06/02 0700 - 06/03 0659 06/03 0700 - 06/04 0659 06/04 0700 - 06/05 0659    P.O. 638      I.V. (mL/kg) 250 (3.2)      Blood       IV Piggyback 50 50 50    Total Intake(mL/kg) 938 (11.9) 50 (0.6) 50 (0.6)    Urine (mL/kg/hr) 991 (0.5) 450 (0.2) 425 (1)    Drains       Stool 0 0     Chest Tube 70      Total Output 1061 450 425    Net -123 -400 -375           Stool Occurrence 2 x 4 x           Lines/Drains/Airways     Line                 Pacer Wires 05/31/19 1530 3 days          Peripheral Intravenous Line                 Peripheral IV - Single Lumen 06/04/19 0640 20 G Posterior;Right Forearm less than 1 day                Physical Exam   Constitutional: He is oriented to person, place, and time. He appears well-developed and well-nourished. No distress.   HENT:   Head: Normocephalic and atraumatic.   Eyes: Pupils are equal, round, and reactive to light. EOM are normal.   Neck: Normal range of motion. Neck supple. No JVD present.   Cardiovascular: Normal rate, regular rhythm, normal heart sounds and intact distal pulses. Exam reveals no gallop and no friction rub.   No murmur heard.  Pacer wires removed. Intrinsic rhythm 76 NSR.    Pulmonary/Chest: Effort normal and breath sounds normal. No stridor. No respiratory distress. He has no wheezes. He has no rales. He exhibits tenderness.   Abdominal: Soft. Bowel sounds are normal. He exhibits no distension. There is no tenderness. There is no guarding.   Musculoskeletal: Normal range of motion. He exhibits edema. He exhibits no tenderness or deformity.   1+ pitting edema to the TOD LE. S/P EVH to the TOD LE.    Neurological: He is alert and oriented to person, place, and time. He  displays normal reflexes. No cranial nerve deficit or sensory deficit. He exhibits normal muscle tone. Coordination normal.   Skin: Skin is warm and dry. Capillary refill takes less than 2 seconds. No rash noted. He is not diaphoretic. No erythema. No pallor.   Psychiatric: He has a normal mood and affect. His behavior is normal. Judgment and thought content normal.   Nursing note and vitals reviewed.      Significant Labs:  BMP:   Recent Labs   Lab 06/04/19  0534      *   K 3.6   CL 92*   CO2 31*   BUN 23   CREATININE 0.9   CALCIUM 8.8   MG 1.7     CBC:   Recent Labs   Lab 06/04/19  0534   WBC 12.47   RBC 2.96*   HGB 9.0*   HCT 25.5*      MCV 86   MCH 30.4   MCHC 35.3       Significant Diagnostics:  I have reviewed all pertinent imaging results/findings within the past 24 hours.    Assessment/Plan:     S/P CABG x 5       06/01/2019  The patient is post-operative day 1, status-post coronary artery bypass grafting x 5.  Overall the patient is doing well.    Neuro:  The patient is awake alert and oriented x3.  Neuro exam is nonfocal.  Pain is well controlled. D/C fentanyl.   Cardiac:  Patient is off all gtts.  Excellent cardiac index.  Patient will be started on metoprolol.   Respiratory: Good sats on nasal cannula.  Continue respiratory toilet, continue incentive spirometer.  GI:  Advance diet as tolerated. Cardiac diet.    Renal:  Good urine output. Cr 1.1. K 3.8. K replaced. Mag 2.6.  Heme:  Hct 20.  Plt 124. Administer 2 units PRBC. Continue Aspirin and Plavix.   Id:  White count is 14. Tmax 100.8  Suspect secondary to stress of surgery. Will continue to follow.  Endocrine:  Glucose is controlled with insulin gtt.  Discontinue insulin gtt. Start sliding scale insulin.   Activities:  Patient is out of bed to chair.  Advance activities as tolerated.  Lines tubes and drains:  Discontinue [place] and [place]is.  Discontinue A-line. Discontinue cordero catheter. Discontinue AV x2. Continue chest tubes x 4  for now.  Continue pacer wires. Start midline.          06/02/2019  The patient is post-operative day 2, status-post coronary artery bypass grafting x 5.  Overall the patient is doing well. Anticipate transfer to telemetry today.    Neuro:  The patient is awake alert and oriented x3.  Neuro exam is nonfocal.  Pain is well controlled. D/C oxycodone. Start tramadol.   Cardiac: Hemodynamically stable. Continue metoprolol at current dose.   Respiratory: Good sats on nasal cannula.  Continue respiratory toilet, continue incentive spirometer. Wean to room air.   GI:  Advance diet as tolerated. Cardiac diet.    Renal:  Good urine output. Cr 1.0. K 5.0. Hold K today. Mag 2.4. Once dose of Metolazone. Repeat BMP at 1300.    Heme:  Hct 27.8.  Plt 114. Continue Aspirin and Plavix.   Id:  White count is 22. Tmax 100.4  Suspect secondary to stress of surgery. Will continue to follow.  Endocrine:  Glucose is well controlled with sliding scale insulin.   Activities:  Patient is out of bed to chair.  Advance activities as tolerated.  Lines tubes and drains: Discontinue chest tubes x4. Discontinue cordero catheter. Continue pacer wires. Continue midline.  Plan: Anticipate transfer to telemetry today.          06/03/2019  The patient is post-operative day 3, status-post coronary artery bypass grafting x 5.  Overall the patient is doing well. Anticipate discharge to home health in 24-48 hours.     Neuro:  The patient is awake alert and oriented x3.  Neuro exam is nonfocal.  Pain is well controlled.   Cardiac: Hemodynamically stable. Continue metoprolol at current dose.   Respiratory: Good sats on nasal cannula.  Continue respiratory toilet, continue incentive spirometer. Wean to room air.   GI:  Advance diet as tolerated. Cardiac diet. Regular BM x2 yesterday.   Renal:  Good urine output. Cr 0.9. K 3.9. PO Lasix BID, PO K BID.   Heme:  CBC pending. Continue Aspirin and Plavix.   Id:  CBC pending. Tmax 99.8. ID on board r/t increased  WBC. On linezolid and aztreonam. Will continue to follow.  Endocrine:  Glucose is well controlled with sliding scale insulin.   Activities:  Patient is out of bed to chair.  Advance activities as tolerated. Increase PT. Increase ambulation.   Lines tubes and drains: Continue pacer wires. Continue midline.  Plan: Anticipate discharge to home health in 24-48 hours.          06/04/2019  The patient is post-operative day 4, status-post coronary artery bypass grafting x 5.  Overall the patient is doing well. Anticipate discharge to home health in 24 hours.     Neuro:  The patient is awake alert and oriented x3.  Neuro exam is nonfocal.  Pain is well controlled.   Cardiac: Systolic BP consistently less than 100, asymptomatic. LR infusion. Decrease metoprolol dose.   Respiratory: Good sats on nasal cannula.  Continue respiratory toilet, continue incentive spirometer. Change to room air.   GI:  Advance diet as tolerated. Cardiac diet. Regular BM x2 yesterday.   Renal:  Good urine output. Cr 0.9. K 3.6. Mag 1.7. Replace K. Replace Mag. Stop diuresis.   Heme:   Hct 25.5. Plt 159. Continue Aspirin and Plavix.   Id:   WBC down to 12.4. Tmax 100. ID on board. On linezolid and aztreonam. Will continue to follow.  Endocrine:  Glucose is well controlled with sliding scale insulin.   Activities:  Patient is out of bed to chair.  Advance activities as tolerated. Increase PT. Increase ambulation.   Lines tubes and drains: Discontinue pacer wires. Continue midline.  Plan: Anticipate discharge to home health in 24 hours.       Coronary artery disease involving native coronary artery of native heart with angina pectoris  05/29/2019  The patient is a 73 year old male with 80% proximal LAD stenosis that was worked up for NSTEMI. The patient has a history of prostate cancer. The patient with 80% proximal LAD stenosis and multivessel coronary artery disease is a candidate for urgent coronary artery bypass grafting. The risks and benefits of  surgery have been explained to the patient, his wife, and his adult daughter, in great detail. All questions have been answered to the patient's satisfaction. The patient has agreed to go ahead with coronary artery bypass grafting, which will be scheduled for 05/31/2019.         Duane Carpenter NP  Cardiothoracic Surgery  [facility]

## 2019-06-04 NOTE — ASSESSMENT & PLAN NOTE
06/01/2019  The patient is post-operative day 1, status-post coronary artery bypass grafting x 5.  Overall the patient is doing well.    Neuro:  The patient is awake alert and oriented x3.  Neuro exam is nonfocal.  Pain is well controlled. D/C fentanyl.   Cardiac:  Patient is off all gtts.  Excellent cardiac index.  Patient will be started on metoprolol.   Respiratory: Good sats on nasal cannula.  Continue respiratory toilet, continue incentive spirometer.  GI:  Advance diet as tolerated. Cardiac diet.    Renal:  Good urine output. Cr 1.1. K 3.8. K replaced. Mag 2.6.  Heme:  Hct 20.  Plt 124. Administer 2 units PRBC. Continue Aspirin and Plavix.   Id:  White count is 14. Tmax 100.8  Suspect secondary to stress of surgery. Will continue to follow.  Endocrine:  Glucose is controlled with insulin gtt.  Discontinue insulin gtt. Start sliding scale insulin.   Activities:  Patient is out of bed to chair.  Advance activities as tolerated.  Lines tubes and drains:  Discontinue [place] and [place].  Discontinue A-line. Discontinue cordero catheter. Discontinue AV x2. Continue chest tubes x 4 for now.  Continue pacer wires. Start midline.          06/02/2019  The patient is post-operative day 2, status-post coronary artery bypass grafting x 5.  Overall the patient is doing well. Anticipate transfer to telemetry today.    Neuro:  The patient is awake alert and oriented x3.  Neuro exam is nonfocal.  Pain is well controlled. D/C oxycodone. Start tramadol.   Cardiac: Hemodynamically stable. Continue metoprolol at current dose.   Respiratory: Good sats on nasal cannula.  Continue respiratory toilet, continue incentive spirometer. Wean to room air.   GI:  Advance diet as tolerated. Cardiac diet.    Renal:  Good urine output. Cr 1.0. K 5.0. Hold K today. Mag 2.4. Once dose of Metolazone. Repeat BMP at 1300.    Heme:  Hct 27.8.  Plt 114. Continue Aspirin and Plavix.   Id:  White count is 22. Tmax 100.4  Suspect secondary to stress of  surgery. Will continue to follow.  Endocrine:  Glucose is well controlled with sliding scale insulin.   Activities:  Patient is out of bed to chair.  Advance activities as tolerated.  Lines tubes and drains: Discontinue chest tubes x4. Discontinue cordero catheter. Continue pacer wires. Continue midline.  Plan: Anticipate transfer to telemetry today.          06/03/2019  The patient is post-operative day 3, status-post coronary artery bypass grafting x 5.  Overall the patient is doing well. Anticipate discharge to home health in 24-48 hours.     Neuro:  The patient is awake alert and oriented x3.  Neuro exam is nonfocal.  Pain is well controlled.   Cardiac: Hemodynamically stable. Continue metoprolol at current dose.   Respiratory: Good sats on nasal cannula.  Continue respiratory toilet, continue incentive spirometer. Wean to room air.   GI:  Advance diet as tolerated. Cardiac diet. Regular BM x2 yesterday.   Renal:  Good urine output. Cr 0.9. K 3.9. PO Lasix BID, PO K BID.   Heme:  CBC pending. Continue Aspirin and Plavix.   Id:  CBC pending. Tmax 99.8. ID on board r/t increased WBC. On linezolid and aztreonam. Will continue to follow.  Endocrine:  Glucose is well controlled with sliding scale insulin.   Activities:  Patient is out of bed to chair.  Advance activities as tolerated. Increase PT. Increase ambulation.   Lines tubes and drains: Continue pacer wires. Continue midline.  Plan: Anticipate discharge to home health in 24-48 hours.          06/04/2019  The patient is post-operative day 4, status-post coronary artery bypass grafting x 5.  Overall the patient is doing well. Anticipate discharge to home health in 24 hours.     Neuro:  The patient is awake alert and oriented x3.  Neuro exam is nonfocal.  Pain is well controlled.   Cardiac: Systolic BP consistently less than 100, asymptomatic. LR infusion. Decrease metoprolol dose.   Respiratory: Good sats on nasal cannula.  Continue respiratory toilet, continue  incentive spirometer. Change to room air.   GI:  Advance diet as tolerated. Cardiac diet. Regular BM x2 yesterday.   Renal:  Good urine output. Cr 0.9. K 3.6. Mag 1.7. Replace K. Replace Mag. Stop diuresis.   Heme:  Hct 25.5. Plt 159. Continue Aspirin and Plavix.   Id:  WBC down to 12.4. Tmax 100. ID on board. On linezolid and aztreonam. Will continue to follow.  Endocrine:  Glucose is well controlled with sliding scale insulin.   Activities:  Patient is out of bed to chair.  Advance activities as tolerated. Increase PT. Increase ambulation.   Lines tubes and drains: Discontinue pacer wires. Continue midline.  Plan: Anticipate discharge to home health in 24 hours.

## 2019-06-04 NOTE — SUBJECTIVE & OBJECTIVE
Interval History:  06/04/2019  The patient is post-operative day 4, status-post coronary artery bypass grafting x 5.    ROS  Medications:  Continuous Infusions:   lactated ringers 100 mL/hr at 06/04/19 1009     Scheduled Meds:   aspirin  81 mg Oral Daily    atorvastatin  80 mg Oral Daily    aztreonam  1,000 mg Intravenous Q8H    chlorhexidine  10 mL Mouth/Throat BID    clopidogrel  75 mg Oral Daily    cyanocobalamin  1,000 mcg Oral Daily    ferrous sulfate  325 mg Oral BID    folic acid  1 mg Oral Daily    linezolid  600 mg Oral Q12H    magnesium sulfate IVPB  2 g Intravenous Once    metoprolol tartrate  12.5 mg Oral BID    nozaseptin   Each Nare BID    pantoprazole  40 mg Oral Daily    polyethylene glycol  17 g Oral Daily     PRN Meds:sodium chloride, acetaminophen, albumin human 5%, ceFAZolin (ANCEF) IVPB, dextrose 50%, dextrose 50%, glucagon (human recombinant), glucose, glucose, insulin aspart U-100, lactated ringers, magnesium sulfate IVPB, metoclopramide HCl, ondansetron, ondansetron, potassium chloride in water **AND** potassium chloride in water **AND** potassium chloride in water, traMADol     Objective:     Vital Signs (Most Recent):  Temp: 98 °F (36.7 °C) (06/04/19 1125)  Pulse: 78 (06/04/19 1125)  Resp: 16 (06/04/19 1125)  BP: (!) 99/52 (06/04/19 1125)  SpO2: (!) 94 % (06/04/19 1125) Vital Signs (24h Range):  Temp:  [97.6 °F (36.4 °C)-100 °F (37.8 °C)] 98 °F (36.7 °C)  Pulse:  [78-94] 78  Resp:  [16-20] 16  SpO2:  [91 %-96 %] 94 %  BP: ()/(50-57) 99/52     Weight: 78.9 kg (173 lb 15.1 oz)  Body mass index is 27.24 kg/m².    SpO2: (!) 94 %  O2 Device (Oxygen Therapy): room air    Intake/Output - Last 3 Shifts       06/02 0700 - 06/03 0659 06/03 0700 - 06/04 0659 06/04 0700 - 06/05 0659    P.O. 638      I.V. (mL/kg) 250 (3.2)      Blood       IV Piggyback 50 50 50    Total Intake(mL/kg) 938 (11.9) 50 (0.6) 50 (0.6)    Urine (mL/kg/hr) 991 (0.5) 450 (0.2) 425 (1)    Drains       Stool 0  0     Chest Tube 70      Total Output 1061 450 425    Net -123 -400 -375           Stool Occurrence 2 x 4 x           Lines/Drains/Airways     Line                 Pacer Wires 05/31/19 1530 3 days          Peripheral Intravenous Line                 Peripheral IV - Single Lumen 06/04/19 0640 20 G Posterior;Right Forearm less than 1 day                Physical Exam   Constitutional: He is oriented to person, place, and time. He appears well-developed and well-nourished. No distress.   HENT:   Head: Normocephalic and atraumatic.   Eyes: Pupils are equal, round, and reactive to light. EOM are normal.   Neck: Normal range of motion. Neck supple. No JVD present.   Cardiovascular: Normal rate, regular rhythm, normal heart sounds and intact distal pulses. Exam reveals no gallop and no friction rub.   No murmur heard.  Pacer wires removed. Intrinsic rhythm 76 NSR.    Pulmonary/Chest: Effort normal and breath sounds normal. No stridor. No respiratory distress. He has no wheezes. He has no rales. He exhibits tenderness.   Abdominal: Soft. Bowel sounds are normal. He exhibits no distension. There is no tenderness. There is no guarding.   Musculoskeletal: Normal range of motion. He exhibits edema. He exhibits no tenderness or deformity.   1+ pitting edema to the TOD LE. S/P EVH to the TOD LE.    Neurological: He is alert and oriented to person, place, and time. He displays normal reflexes. No cranial nerve deficit or sensory deficit. He exhibits normal muscle tone. Coordination normal.   Skin: Skin is warm and dry. Capillary refill takes less than 2 seconds. No rash noted. He is not diaphoretic. No erythema. No pallor.   Psychiatric: He has a normal mood and affect. His behavior is normal. Judgment and thought content normal.   Nursing note and vitals reviewed.      Significant Labs:  BMP:   Recent Labs   Lab 06/04/19  0534      *   K 3.6   CL 92*   CO2 31*   BUN 23   CREATININE 0.9   CALCIUM 8.8   MG 1.7     CBC:    Recent Labs   Lab 06/04/19  0534   WBC 12.47   RBC 2.96*   HGB 9.0*   HCT 25.5*      MCV 86   MCH 30.4   MCHC 35.3       Significant Diagnostics:  I have reviewed all pertinent imaging results/findings within the past 24 hours.

## 2019-06-04 NOTE — PROGRESS NOTES
[facility] -   Cardiology  Progress Note    Patient Name: Chapin Calderon  MRN: [id number]  Admission Date: 5/29/2019  Hospital Length of Stay: 6 days  Code Status: Full Code   Attending Physician: Mani Dorado MD   Primary Care Physician: Tiffanie Vicente MD  Expected Discharge Date: 6/7/2019  Principal Problem:NSTEMI (non-ST elevated myocardial infarction)    Subjective:   HPI:  Pt c/o chest pain, mid chest, all day yesterday which prompted him to go to ER overnight.  Cp much improved now, seems mostly resolved.  ecg shows NSR, low voltage, possible septal infarct.  Troponin 6.2 on initial labs.   .  No prior h/o CAD.  Active male with no significant health issues otherwise per pt.    Hospital Course:   5/30/19--Patient seen and examined in room, lying in bed. Denies chest pain or SOB today. IV heparin gtt in place. Plans for CABG tomorrow per CT surgery. Labs reviewed, K+ 4.5, Cr 0.8, Mag 1.9.     5/31/19--Patient seen and examined in ICU, intubated and sedated post CABG. CABG x 5V per Dr. Dorado today. Labs reviewed, INR 1.2, K+ 4.2, Cr 1.0, Mag 3.8, H/H 9/25.1.     6/1/19:  PT SEEN AND EXAMINED IN ICU.  EXTUBATED.  NO ANGINA.  USUAL POST OP DISCOMFORT.  NO DYSPNEA.  LABS SHOWS SEVERE POST OP ANEMIA.  RECEIVING PRBC THIS AM.    6/2/19:  PT SEEN AND EXAMINED IN ICU.  NO ACUTE CV ISSUES NOTED.  CHEST TUBES BEING PULLED THIS AM.  HG IMPROVED. NO ACTIVE ANGINA OR CHF SXS. APPROPRIATE POST OP PAIN.    6/3/19-Patient seen and examined today. Feels well. Ambulated this AM. No dmitry chest pain. Labs reviewed, WBC elevated.     6/4/19-Patient seen and examined today. Uneventful night. No complaints. Labs stable.         Review of Systems   Constitution: Positive for malaise/fatigue.   HENT: Negative.    Eyes: Negative.    Cardiovascular: Negative.    Respiratory: Negative.    Endocrine: Negative.    Hematologic/Lymphatic: Negative.    Skin: Negative.    Musculoskeletal: Negative.     Gastrointestinal: Negative.    Genitourinary: Negative.    Neurological: Negative.    Psychiatric/Behavioral: Negative.    Allergic/Immunologic: Negative.      Objective:     Vital Signs (Most Recent):  Temp: 98 °F (36.7 °C) (06/04/19 1125)  Pulse: 78 (06/04/19 1125)  Resp: 16 (06/04/19 1125)  BP: (!) 99/52 (06/04/19 1125)  SpO2: (!) 94 % (06/04/19 1125) Vital Signs (24h Range):  Temp:  [97.6 °F (36.4 °C)-100 °F (37.8 °C)] 98 °F (36.7 °C)  Pulse:  [78-94] 78  Resp:  [16-20] 16  SpO2:  [91 %-96 %] 94 %  BP: ()/(50-57) 99/52     Weight: 78.9 kg (173 lb 15.1 oz)  Body mass index is 27.24 kg/m².     SpO2: (!) 94 %  O2 Device (Oxygen Therapy): room air      Intake/Output Summary (Last 24 hours) at 6/4/2019 1426  Last data filed at 6/4/2019 1200  Gross per 24 hour   Intake 100 ml   Output 875 ml   Net -775 ml       Lines/Drains/Airways     Line                 Pacer Wires 05/31/19 1530 3 days          Peripheral Intravenous Line                 Peripheral IV - Single Lumen 06/04/19 0640 20 G Posterior;Right Forearm less than 1 day                Physical Exam   Constitutional: He is oriented to person, place, and time. He appears well-developed and well-nourished. No distress.   HENT:   Head: Normocephalic and atraumatic.   Eyes: Pupils are equal, round, and reactive to light. Right eye exhibits no discharge. Left eye exhibits no discharge.   Neck: Neck supple. No JVD present.   Cardiovascular: Normal rate, regular rhythm, S1 normal, S2 normal and normal heart sounds.   No murmur heard.  Pulmonary/Chest: Effort normal. No respiratory distress. He has no wheezes.   Sternotomy site C/D/I; no bleeding erythema or drainage    Diminished BS at basess   Abdominal: Soft. He exhibits no distension.   Musculoskeletal: He exhibits edema (BLE).   Neurological: He is alert and oriented to person, place, and time.   Skin: Skin is warm and dry. He is not diaphoretic. No erythema.   Psychiatric: He has a normal mood and affect.  His behavior is normal. Thought content normal.   Nursing note and vitals reviewed.      Significant Labs:   CMP   Recent Labs   Lab 06/02/19  1703 06/03/19  0426 06/04/19  0534   * 131* 132*   K 4.1 3.9 3.6   CL 95 94* 92*   CO2 28 30* 31*   * 94 101   BUN 27* 26* 23   CREATININE 1.0 0.9 0.9   CALCIUM 9.1 8.7 8.8   ANIONGAP 9 7* 9   ESTGFRAFRICA >60 >60 >60   EGFRNONAA >60 >60 >60   , CBC   Recent Labs   Lab 06/03/19  1044 06/04/19  0534   WBC 17.47* 12.47   HGB 9.0* 9.0*   HCT 26.4* 25.5*   * 159   , Troponin No results for input(s): TROPONINI in the last 48 hours. and All pertinent lab results from the last 24 hours have been reviewed.    Significant Imaging: Echocardiogram:   2D echo with color flow doppler:   Results for orders placed or performed during the hospital encounter of 05/29/19   2D echo with color flow doppler   Result Value Ref Range    QEF 60 55 - 65    Diastolic Dysfunction No     Est. PA Systolic Pressure 30.46    , EKG: Reviewed and X-Ray: CXR: X-Ray Chest 1 View (CXR): No results found for this visit on 05/29/19. and X-Ray Chest PA and Lateral (CXR): No results found for this visit on 05/29/19.    Assessment and Plan:   Patient recovering s/p CABG. Stable CV wise. Continue same meds/mgmt. Will follow along.    Anemia following surgery  -Stable  -Mgmt as per CVT    CAD, multiple vessel  -Recovering s/p CABG  -Continue current mgmt    6/4/19  -Remains stable CV wise  -Continue ASA, Plavix, BB, statin  -Resume ACEi/ARB when ok with CVT    S/P CABG x 5  CABG x 5V per CT surgery  Intubated, sedated in ICU today post surgery  Mgmt per CT surgery  Continue ASA, Statin, BB  Resume ARB once OK with CT surgery  Will continue to follow along    6/3/19  -Stable overnight  -Continue ASA, statin, BB  -Resume ARB when ok with CVT  -Ambulation and IS usage    6/4/19  -Remains stable CV wise  -Continue ASA, Plavix, statin, BB  -Resume ARB when ok with CVT    Coronary artery disease  involving native coronary artery of native heart with angina pectoris  -See plan for s/p CABG    AP (angina pectoris)  See management plan detailed above.     Abnormal ECG  See management plan detailed above.         VTE Risk Mitigation (From admission, onward)        Ordered     IP VTE LOW RISK PATIENT  Once      05/30/19 0752     heparin 25,000 units in dextrose 5% 250 mL (100 units/mL) infusion LOW INTENSITY nomogram - [facility]  Continuous      05/29/19 2001     Reason for no Mechanical VTE Prophylaxis  Once      05/29/19 1431          Denise Lugo PA-C  Cardiology  [facility]    Chart reviewed. Dr. Walters examined patient and agrees with plan as outlined above.

## 2019-06-04 NOTE — ASSESSMENT & PLAN NOTE
CABG x 5V per CT surgery  Intubated, sedated in ICU today post surgery  Mgmt per CT surgery  Continue ASA, Statin, BB  Resume ARB once OK with CT surgery  Will continue to follow along    6/3/19  -Stable overnight  -Continue ASA, statin, BB  -Resume ARB when ok with CVT  -Ambulation and IS usage    6/4/19  -Remains stable CV wise  -Continue ASA, Plavix, statin, BB  -Resume ARB when ok with CVT

## 2019-06-04 NOTE — PT/OT/SLP PROGRESS
"Occupational Therapy  Treatment    Chapin Calderon   MRN: [id number]   Admitting Diagnosis: NSTEMI (non-ST elevated myocardial infarction)    OT Date of Treatment: 06/04/19   OT Start Time: 0905  OT Stop Time: 0930  OT Total Time (min): 25 min    Billable Minutes:  Self Care/Home Management 12 min and Therapeutic Activity 13 min    General Precautions: Standard, sternal  Orthopedic Precautions: N/A  Braces: N/A         Subjective:  Communicated with Nurse Iniguez and epic chart review prior to session.  Pt found sitting in recliner and agreeable to tx at this.   Pain/Comfort  Pain Rating 1: 0/10  Pain Rating Post-Intervention 1: 0/10    Objective:  Patient found with: telemetry, peripheral IV     Functional Mobility:  Therapeutic Activities and Exercises:  OT reviewed sternal precautions, pt able to recall 4 out of 5. Pt performed sit>stand from recliner with Supervision,t/f to toilet with Supervision, toilet hygiene with Supervision and Verbal cues in order to maintain sternal precautions, sit>stand from toilet with Supervision, stood to wash his hands with Mod I, functional mobility ~420ft with Supervision, t/f to recliner with Supervision.     AM-PAC 6 CLICK ADL   How much help from another person does this patient currently need?   1 = Unable, Total/Dependent Assistance  2 = A lot, Maximum/Moderate Assistance  3 = A little, Minimum/Contact Guard/Supervision  4 = None, Modified [place]/Independent    Putting on and taking off regular lower body clothing? : 3  Bathing (including washing, rinsing, drying)?: 3  Toileting, which includes using toilet, bedpan, or urinal? : 3  Putting on and taking off regular upper body clothing?: 3  Taking care of personal grooming such as brushing teeth?: 4  Eating meals?: 4  Daily Activity Total Score: 20     AM-PAC Raw Score CMS "G-Code Modifier Level of Impairment Assistance   6 % Total / Unable   7 - 8 CM 80 - 100% Maximal Assist   9-13 CL 60 - 80% Moderate Assist   14 - " 19 CK 40 - 60% Moderate Assist   20 - 22 CJ 20 - 40% Minimal Assist   23 CI 1-20% SBA / CGA   24 CH 0% Independent/ Mod I       Patient left up in chair with all lines intact, call button in reach and Nurse Geetha notified    ASSESSMENT:  Chapin Calderon is a 73 y.o. male with a medical diagnosis of NSTEMI (non-ST elevated myocardial infarction). Pt is functioning at jalyn level and no longer requires skilled OT. Pt is discharged from to [facility].     Rehab identified problem list/impairments:      Rehab potential is good.    Activity tolerance: Good    Discharge recommendations: Discharge Facility/Level of Care Needs: home health PT, home health OT     Barriers to discharge: Barriers to Discharge: None    Equipment recommendations: tub bench     GOALS:   Multidisciplinary Problems     Occupational Therapy Goals     Not on file          Multidisciplinary Problems (Resolved)        Problem: Occupational Therapy Goal    Goal Priority Disciplines Outcome Interventions   Occupational Therapy Goal   (Resolved)     OT, PT/OT Outcome(s) achieved    Description:  Goals to be met by: 6/8/19     Patient will increase functional independence with ADLs by performing:    UE Dressing with Minimal Assistance.  LE Dressing with Moderate Assistance.  Grooming while standing at sink with Contact Guard Assistance.  Toileting from toilet with Moderate Assistance for hygiene and clothing management.   Toilet transfer to toilet with Minimal Assistance.                      Plan:  Pt discharged to [facility].  Plan of Care reviewed with: patient    OT G-codes  Functional Assessment Tool Used: [facility]  Score: 20    Elisabeth Mcleod, PT/OT  06/04/2019

## 2019-06-04 NOTE — ASSESSMENT & PLAN NOTE
-Likely reactive.   -Continue to monitor for signs or symptoms of infection.     06/01- will send serum procalcitonin in AM.  CBC showed- wbc -21.7  Will continue to monitor closely .  Chest x-ray showed-Mild atelectasis on the left.  No significant pulmonary vascular congestion or heart failure    06 /02- will follow serum procalcitonin , follow CBC in AM , discuss with CVT  0603- now started  On empiric antibiotics -will need close follow up, check wbc in am

## 2019-06-04 NOTE — PROGRESS NOTES
[facility] Hospital Medicine  Progress Note    Patient Name: Chapin Calderon  MRN: [id number]  Patient Class: IP- Inpatient   Admission Date: 5/29/2019  Length of Stay: 6 days  Attending Physician: Mani Dorado MD  Primary Care Provider: Tiffanie Vicente MD        Subjective:     Principal Problem:NSTEMI (non-ST elevated myocardial infarction)    HPI:  Chapin Calderon is a 73 year old male with a past medical history of prostate cancer who presented to the ED with complaints of chest pain that began one day prior to presentation. The pain is described as a constant pressure in his left chest that rates a 6/10 on a pain scale. The patient denies radiation and associated symptoms including SOB, nausea, vomiting, diaphoresis and lower extremity edema. He tried Tums with no relieved. However, symptoms resolved shortly after receiving an Asprin in the ED. In the ED, the patient's initial troponin was 6.324 with an increase to 7.465. Code status was discussed with the patient. He is a full code. His wife, Daniela Calderon, is his surrogate medical decision maker.      Hospital Course:  C revealed multi-vessel disease.  Evaluation by CV Surgery with a recommendation for CABG.  Successful CABG 31 May.  Post-op in ICU.    06/01- 73 year old man s/p -status-post coronary artery bypass grafting x 5.  He was seen with wife in the room.  CBC showed-  Component      Latest Ref Rng & Units 6/1/2019 6/1/2019 6/1/2019 5/31/2019           6:10 PM  2:15 PM  3:45 AM  9:17 PM   WBC      3.90 - 12.70 K/uL 21.73 (H) 21.37 (H) 14.56 (H) 17.74 (H)      06/02-  73 year old man s/p CABG X5 , he denies fever or chest pain.  Wbc is 9.7  06/03- remains weak, afebrile , wbc -17, CVT on board    Interval History: 06/03- remains weak, afebrile , wbc -17, CVT on board      Review of Systems   Constitutional: Negative.  Negative for chills and fever.   HENT: Negative.  Negative for congestion and sore throat.    Eyes: Negative.   Negative for visual disturbance.   Respiratory: Negative.  Negative for cough, shortness of breath and wheezing.    Cardiovascular: Negative for chest pain.   Gastrointestinal: Negative for abdominal pain, diarrhea, nausea and vomiting.   Endocrine: Negative.    Genitourinary: Negative.    Musculoskeletal: Negative.  Negative for myalgias and neck stiffness.   Skin: Negative.  Negative for color change and pallor.   Allergic/Immunologic: Negative.    Neurological: Negative.    Hematological: Negative.    Psychiatric/Behavioral: Negative.    All other systems reviewed and are negative.    Objective:     Vital Signs (Most Recent):  Temp: 99.6 °F (37.6 °C) (06/04/19 0552)  Pulse: 90 (06/04/19 0600)  Resp: 20 (06/04/19 0552)  BP: (!) 97/53 (06/04/19 0552)  SpO2: (!) 94 % (06/04/19 0700) Vital Signs (24h Range):  Temp:  [98 °F (36.7 °C)-100 °F (37.8 °C)] 99.6 °F (37.6 °C)  Pulse:  [90-91] 90  Resp:  [16-20] 20  SpO2:  [91 %-95 %] 94 %  BP: ()/(50-57) 97/53     Weight: 78.9 kg (173 lb 15.1 oz)  Body mass index is 27.24 kg/m².    Intake/Output Summary (Last 24 hours) at 6/4/2019 0858  Last data filed at 6/4/2019 0732  Gross per 24 hour   Intake 100 ml   Output 750 ml   Net -650 ml      Physical Exam   Constitutional: He is oriented to person, place, and time. He appears well-developed and well-nourished. No distress.   HENT:   Head: Normocephalic and atraumatic.   Eyes: Pupils are equal, round, and reactive to light. EOM are normal.   Neck: Normal range of motion. Neck supple. No JVD present.   Cardiovascular: Normal rate, regular rhythm, normal heart sounds and intact distal pulses. Exam reveals no gallop and no friction rub.   No murmur heard.  Paced AAI.    Pulmonary/Chest: Effort normal. No stridor. No respiratory distress. He has no wheezes. He has no rales. He exhibits tenderness.   -    Abdominal: Soft. Bowel sounds are normal. He exhibits no distension. There is no tenderness. There is no guarding.    Musculoskeletal: Normal range of motion. He exhibits edema. He exhibits no tenderness or deformity.   1+ pitting edema to TOD LE, s/p EVH to TOD LE.    Neurological: He is alert and oriented to person, place, and time. He displays normal reflexes. No cranial nerve deficit or sensory deficit. He exhibits normal muscle tone. Coordination normal.   Skin: Skin is warm and dry. Capillary refill takes less than 2 seconds. No rash noted. He is not diaphoretic. No erythema. No pallor.   Psychiatric: He has a normal mood and affect. His behavior is normal. Judgment and thought content normal.   Nursing note and vitals reviewed.      Significant Labs: All pertinent labs within the past 24 hours have been reviewed.    Significant Imaging: I have reviewed and interpreted all pertinent imaging results/findings within the past 24 hours.    Assessment/Plan:      Anemia following surgery    06/01- hemoglobin is 10.3, will monitor closely and transfuse as needed    06/02- will continue close monitoring of serum hemoglobin , hemoglobin is 9.7.             S/P CABG x 5    06.01- CVT follow up, continue supportive care     Leukocytosis  -Likely reactive.   -Continue to monitor for signs or symptoms of infection.     06/01- will send serum procalcitonin in AM.  CBC showed- wbc -21.7  Will continue to monitor closely .  Chest x-ray showed-Mild atelectasis on the left.  No significant pulmonary vascular congestion or heart failure    06 /02- will follow serum procalcitonin , follow CBC in AM , discuss with CVT  0603- now started  On empiric antibiotics -will need close follow up, check wbc in am       VTE Risk Mitigation (From admission, onward)        Ordered     IP VTE LOW RISK PATIENT  Once      05/30/19 0752     heparin 25,000 units in dextrose 5% 250 mL (100 units/mL) infusion LOW INTENSITY nomogram - [facility]  Continuous      05/29/19 2001     Reason for no Mechanical VTE Prophylaxis  Once      05/29/19 1431              Jose Eduardo Cantu,  MD  Department of Hospital Medicine   [facility]

## 2019-06-04 NOTE — SUBJECTIVE & OBJECTIVE
Review of Systems   Constitution: Positive for malaise/fatigue.   HENT: Negative.    Eyes: Negative.    Cardiovascular: Negative.    Respiratory: Negative.    Endocrine: Negative.    Hematologic/Lymphatic: Negative.    Skin: Negative.    Musculoskeletal: Negative.    Gastrointestinal: Negative.    Genitourinary: Negative.    Neurological: Negative.    Psychiatric/Behavioral: Negative.    Allergic/Immunologic: Negative.      Objective:     Vital Signs (Most Recent):  Temp: 98 °F (36.7 °C) (06/04/19 1125)  Pulse: 78 (06/04/19 1125)  Resp: 16 (06/04/19 1125)  BP: (!) 99/52 (06/04/19 1125)  SpO2: (!) 94 % (06/04/19 1125) Vital Signs (24h Range):  Temp:  [97.6 °F (36.4 °C)-100 °F (37.8 °C)] 98 °F (36.7 °C)  Pulse:  [78-94] 78  Resp:  [16-20] 16  SpO2:  [91 %-96 %] 94 %  BP: ()/(50-57) 99/52     Weight: 78.9 kg (173 lb 15.1 oz)  Body mass index is 27.24 kg/m².     SpO2: (!) 94 %  O2 Device (Oxygen Therapy): room air      Intake/Output Summary (Last 24 hours) at 6/4/2019 1426  Last data filed at 6/4/2019 1200  Gross per 24 hour   Intake 100 ml   Output 875 ml   Net -775 ml       Lines/Drains/Airways     Line                 Pacer Wires 05/31/19 1530 3 days          Peripheral Intravenous Line                 Peripheral IV - Single Lumen 06/04/19 0640 20 G Posterior;Right Forearm less than 1 day                Physical Exam   Constitutional: He is oriented to person, place, and time. He appears well-developed and well-nourished. No distress.   HENT:   Head: Normocephalic and atraumatic.   Eyes: Pupils are equal, round, and reactive to light. Right eye exhibits no discharge. Left eye exhibits no discharge.   Neck: Neck supple. No JVD present.   Cardiovascular: Normal rate, regular rhythm, S1 normal, S2 normal and normal heart sounds.   No murmur heard.  Pulmonary/Chest: Effort normal. No respiratory distress. He has no wheezes.   Sternotomy site C/D/I; no bleeding erythema or drainage    Diminished BS at basess    Abdominal: Soft. He exhibits no distension.   Musculoskeletal: He exhibits edema (BLE).   Neurological: He is alert and oriented to person, place, and time.   Skin: Skin is warm and dry. He is not diaphoretic. No erythema.   Psychiatric: He has a normal mood and affect. His behavior is normal. Thought content normal.   Nursing note and vitals reviewed.      Significant Labs:   CMP   Recent Labs   Lab 06/02/19  1703 06/03/19  0426 06/04/19  0534   * 131* 132*   K 4.1 3.9 3.6   CL 95 94* 92*   CO2 28 30* 31*   * 94 101   BUN 27* 26* 23   CREATININE 1.0 0.9 0.9   CALCIUM 9.1 8.7 8.8   ANIONGAP 9 7* 9   ESTGFRAFRICA >60 >60 >60   EGFRNONAA >60 >60 >60   , CBC   Recent Labs   Lab 06/03/19  1044 06/04/19  0534   WBC 17.47* 12.47   HGB 9.0* 9.0*   HCT 26.4* 25.5*   * 159   , Troponin No results for input(s): TROPONINI in the last 48 hours. and All pertinent lab results from the last 24 hours have been reviewed.    Significant Imaging: Echocardiogram:   2D echo with color flow doppler:   Results for orders placed or performed during the hospital encounter of 05/29/19   2D echo with color flow doppler   Result Value Ref Range    QEF 60 55 - 65    Diastolic Dysfunction No     Est. PA Systolic Pressure 30.46    , EKG: Reviewed and X-Ray: CXR: X-Ray Chest 1 View (CXR): No results found for this visit on 05/29/19. and X-Ray Chest PA and Lateral (CXR): No results found for this visit on 05/29/19.

## 2019-06-04 NOTE — PLAN OF CARE
Problem: Occupational Therapy Goal  Goal: Occupational Therapy Goal  Goals to be met by: 6/8/19     Patient will increase functional independence with ADLs by performing:    UE Dressing with Minimal Assistance.  LE Dressing with Moderate Assistance.  Grooming while standing at sink with Contact Guard Assistance.  Toileting from toilet with Moderate Assistance for hygiene and clothing management.   Toilet transfer to toilet with Minimal Assistance.     Outcome: Ongoing (interventions implemented as appropriate)  T/fs with Supervision; toileting with Supervision and VCs to maintain sternal precautions

## 2019-06-04 NOTE — PHYSICIAN QUERY
PT Name: Chapin Calderon  MR #: [id number]     PHYSICIAN QUERY -  ELECTROLYTE CLARIFICATION      CDS/: Mirta Brown RN              Contact information:[phone]  This form is a permanent document in the medical record.     Query Date: June 4, 2019    By submitting this query, we are merely seeking further clarification of documentation to reflect the severity of illness of your patient. Please utilize your independent clinical judgment when addressing the question(s) below.    The Medical record reflects the following:     Indicators   Supporting Clinical Findings Location in Medical Record   x Lab Value(s) Magnesium = 3.8-> 3.0-> 2.6-> 2.3   Lab 5/31, 5/31, 6/1   x Treatment                                 Medication Calcium gluconate 2,000  IVPB   Calcium gluconate 3,000  IVPB  MAR 5/31  MAR 6/1      Other       Doctor, please specify the diagnosis or diagnoses that correspond(s) to the above indicators. Mani all that apply:    [   ] Hypermagnesemia     [ x  ] Other electrolyte disturbance (please specify): _temporary high magnesium due to mag supplementaion. No treatment neccesary______     [   ]  Clinically Undetermined       Please document in your progress notes daily for the duration of treatment until resolved, and include in your discharge summary.

## 2019-06-04 NOTE — NURSING
Pt up walking in the garsia with JOEY Keita. Checked pt O2 saturation walking in the garsia without O2 and he was 93% Will continue to monitor.

## 2019-06-04 NOTE — PLAN OF CARE
Problem: Physical Therapy Goal  Goal: Physical Therapy Goal  1. Patient will perform sup to/from sit cga  2. Patient will perform sit to/from stand sba to prep amb  3. Patient will amb >300ft sba no gross LOB   Outcome: Outcome(s) achieved Date Met: 06/04/19  D/C PT FROM [facility], PT SPV FOR TF'S AND GAIT

## 2019-06-04 NOTE — PLAN OF CARE
Problem: Adult Inpatient Plan of Care  Goal: Plan of Care Review  Outcome: Ongoing (interventions implemented as appropriate)  POC reviewed with patient, and pt spouse they both  verbalized understanding. Pt remains free from falls. Fall precautions in place. Non skid socks in use , bed alarm and chair alarm in use.  Pt still has pacing wires and is apaced on cardiac monitor. VSS. No other complaints at this time. Call bell w/in reach. Reminded to call for assistance.PIV intact and IV antibiotics administered as ordered. No complaints of pain. Pt has been ambulating in the garsia and walking to and from the bathroom. Sternal precautions in place. Pt is doing his IS every hour while awake , O2 has been weaned and pt is now on room air. Pt stated he feels a little anxious about being discharged home, and would like to stay another day.

## 2019-06-04 NOTE — ASSESSMENT & PLAN NOTE
-Recovering s/p CABG  -Continue current mgmt    6/4/19  -Remains stable CV wise  -Continue ASA, Plavix, BB, statin  -Resume ACEi/ARB when ok with CVT

## 2019-06-05 ENCOUNTER — TELEPHONE (OUTPATIENT)
Dept: CARDIOLOGY | Facility: HOSPITAL | Age: 74
End: 2019-06-05

## 2019-06-05 VITALS
HEART RATE: 73 BPM | BODY MASS INDEX: 26.82 KG/M2 | DIASTOLIC BLOOD PRESSURE: 55 MMHG | OXYGEN SATURATION: 98 % | RESPIRATION RATE: 17 BRPM | SYSTOLIC BLOOD PRESSURE: 104 MMHG | HEIGHT: 67 IN | TEMPERATURE: 98 F | WEIGHT: 170.88 LBS

## 2019-06-05 LAB
ANION GAP SERPL CALC-SCNC: 6 MMOL/L (ref 8–16)
BASOPHILS # BLD AUTO: 0.02 K/UL (ref 0–0.2)
BASOPHILS NFR BLD: 0.2 % (ref 0–1.9)
BUN SERPL-MCNC: 18 MG/DL (ref 8–23)
CALCIUM SERPL-MCNC: 8.3 MG/DL (ref 8.7–10.5)
CHLORIDE SERPL-SCNC: 94 MMOL/L (ref 95–110)
CO2 SERPL-SCNC: 30 MMOL/L (ref 23–29)
CREAT SERPL-MCNC: 0.8 MG/DL (ref 0.5–1.4)
DIFFERENTIAL METHOD: ABNORMAL
EOSINOPHIL # BLD AUTO: 0.2 K/UL (ref 0–0.5)
EOSINOPHIL NFR BLD: 1.7 % (ref 0–8)
ERYTHROCYTE [DISTWIDTH] IN BLOOD BY AUTOMATED COUNT: 13.6 % (ref 11.5–14.5)
EST. GFR  (AFRICAN AMERICAN): >60 ML/MIN/1.73 M^2
EST. GFR  (NON AFRICAN AMERICAN): >60 ML/MIN/1.73 M^2
GLUCOSE SERPL-MCNC: 99 MG/DL (ref 70–110)
HCT VFR BLD AUTO: 24.3 % (ref 40–54)
HGB BLD-MCNC: 8.5 G/DL (ref 14–18)
LYMPHOCYTES # BLD AUTO: 1.4 K/UL (ref 1–4.8)
LYMPHOCYTES NFR BLD: 13 % (ref 18–48)
MAGNESIUM SERPL-MCNC: 2.1 MG/DL (ref 1.6–2.6)
MCH RBC QN AUTO: 30.1 PG (ref 27–31)
MCHC RBC AUTO-ENTMCNC: 35 G/DL (ref 32–36)
MCV RBC AUTO: 86 FL (ref 82–98)
MONOCYTES # BLD AUTO: 1.4 K/UL (ref 0.3–1)
MONOCYTES NFR BLD: 12.9 % (ref 4–15)
NEUTROPHILS # BLD AUTO: 7.7 K/UL (ref 1.8–7.7)
NEUTROPHILS NFR BLD: 72.2 % (ref 38–73)
PHOSPHATE SERPL-MCNC: 2.6 MG/DL (ref 2.7–4.5)
PLATELET # BLD AUTO: 184 K/UL (ref 150–350)
PMV BLD AUTO: 9.4 FL (ref 9.2–12.9)
POTASSIUM SERPL-SCNC: 3.6 MMOL/L (ref 3.5–5.1)
RBC # BLD AUTO: 2.82 M/UL (ref 4.6–6.2)
SODIUM SERPL-SCNC: 130 MMOL/L (ref 136–145)
WBC # BLD AUTO: 10.64 K/UL (ref 3.9–12.7)

## 2019-06-05 PROCEDURE — 99231 PR SUBSEQUENT HOSPITAL CARE,LEVL I: ICD-10-PCS | Mod: ,,, | Performed by: INTERNAL MEDICINE

## 2019-06-05 PROCEDURE — 94761 N-INVAS EAR/PLS OXIMETRY MLT: CPT

## 2019-06-05 PROCEDURE — 84100 ASSAY OF PHOSPHORUS: CPT

## 2019-06-05 PROCEDURE — 99231 SBSQ HOSP IP/OBS SF/LOW 25: CPT | Mod: ,,, | Performed by: INTERNAL MEDICINE

## 2019-06-05 PROCEDURE — 80048 BASIC METABOLIC PNL TOTAL CA: CPT

## 2019-06-05 PROCEDURE — 25000003 PHARM REV CODE 250: Performed by: NURSE PRACTITIONER

## 2019-06-05 PROCEDURE — 85025 COMPLETE CBC W/AUTO DIFF WBC: CPT

## 2019-06-05 PROCEDURE — S0073 INJECTION, AZTREONAM, 500 MG: HCPCS | Performed by: INTERNAL MEDICINE

## 2019-06-05 PROCEDURE — 25000003 PHARM REV CODE 250: Performed by: INTERNAL MEDICINE

## 2019-06-05 PROCEDURE — 36415 COLL VENOUS BLD VENIPUNCTURE: CPT

## 2019-06-05 PROCEDURE — 83735 ASSAY OF MAGNESIUM: CPT

## 2019-06-05 RX ORDER — AMOXICILLIN AND CLAVULANATE POTASSIUM 875; 125 MG/1; MG/1
1 TABLET, FILM COATED ORAL 2 TIMES DAILY
Qty: 8 TABLET | Refills: 0 | Status: SHIPPED | OUTPATIENT
Start: 2019-06-05 | End: 2019-06-09

## 2019-06-05 RX ORDER — ASPIRIN 81 MG/1
81 TABLET ORAL DAILY
Refills: 0 | COMMUNITY
Start: 2019-06-06 | End: 2020-12-16

## 2019-06-05 RX ORDER — FOLIC ACID 1 MG/1
1 TABLET ORAL DAILY
Qty: 90 TABLET | Refills: 0 | Status: SHIPPED | OUTPATIENT
Start: 2019-06-06 | End: 2019-09-04

## 2019-06-05 RX ORDER — POTASSIUM CHLORIDE 20 MEQ/1
40 TABLET, EXTENDED RELEASE ORAL ONCE
Status: COMPLETED | OUTPATIENT
Start: 2019-06-05 | End: 2019-06-05

## 2019-06-05 RX ORDER — LINEZOLID 600 MG/1
600 TABLET, FILM COATED ORAL EVERY 12 HOURS
Qty: 8 TABLET | Refills: 0 | Status: SHIPPED | OUTPATIENT
Start: 2019-06-05 | End: 2019-06-09

## 2019-06-05 RX ORDER — LANOLIN ALCOHOL/MO/W.PET/CERES
1000 CREAM (GRAM) TOPICAL DAILY
COMMUNITY
Start: 2019-06-06 | End: 2019-09-04

## 2019-06-05 RX ORDER — ATORVASTATIN CALCIUM 80 MG/1
80 TABLET, FILM COATED ORAL DAILY
Qty: 90 TABLET | Refills: 3 | Status: SHIPPED | OUTPATIENT
Start: 2019-06-06 | End: 2019-09-12

## 2019-06-05 RX ORDER — PANTOPRAZOLE SODIUM 40 MG/1
40 TABLET, DELAYED RELEASE ORAL DAILY
Qty: 60 TABLET | Refills: 0 | Status: SHIPPED | OUTPATIENT
Start: 2019-06-06 | End: 2020-10-27

## 2019-06-05 RX ORDER — METOPROLOL TARTRATE 25 MG/1
12.5 TABLET, FILM COATED ORAL 2 TIMES DAILY
Qty: 90 TABLET | Refills: 3 | Status: SHIPPED | OUTPATIENT
Start: 2019-06-05 | End: 2020-06-04

## 2019-06-05 RX ORDER — CLOPIDOGREL BISULFATE 75 MG/1
75 TABLET ORAL DAILY
Qty: 90 TABLET | Refills: 3 | Status: SHIPPED | OUTPATIENT
Start: 2019-06-06 | End: 2020-07-15

## 2019-06-05 RX ORDER — POLYETHYLENE GLYCOL 3350 17 G/17G
17 POWDER, FOR SOLUTION ORAL DAILY
Qty: 238 G | Refills: 0 | Status: SHIPPED | OUTPATIENT
Start: 2019-06-06 | End: 2019-06-19

## 2019-06-05 RX ORDER — FERROUS SULFATE 325(65) MG
325 TABLET, DELAYED RELEASE (ENTERIC COATED) ORAL 2 TIMES DAILY
Refills: 0 | COMMUNITY
Start: 2019-06-05 | End: 2019-09-03

## 2019-06-05 RX ADMIN — PANTOPRAZOLE SODIUM 40 MG: 40 TABLET, DELAYED RELEASE ORAL at 09:06

## 2019-06-05 RX ADMIN — METOPROLOL TARTRATE 12.5 MG: 25 TABLET, FILM COATED ORAL at 09:06

## 2019-06-05 RX ADMIN — LINEZOLID 600 MG: 600 TABLET, FILM COATED ORAL at 09:06

## 2019-06-05 RX ADMIN — CYANOCOBALAMIN TAB 1000 MCG 1000 MCG: 1000 TAB at 09:06

## 2019-06-05 RX ADMIN — ATORVASTATIN CALCIUM 80 MG: 40 TABLET, FILM COATED ORAL at 09:06

## 2019-06-05 RX ADMIN — FERROUS SULFATE TAB EC 325 MG (65 MG FE EQUIVALENT) 325 MG: 325 (65 FE) TABLET DELAYED RESPONSE at 09:06

## 2019-06-05 RX ADMIN — CHLORHEXIDINE GLUCONATE 0.12% ORAL RINSE 10 ML: 1.2 LIQUID ORAL at 09:06

## 2019-06-05 RX ADMIN — FOLIC ACID 1 MG: 1 TABLET ORAL at 09:06

## 2019-06-05 RX ADMIN — POTASSIUM CHLORIDE 40 MEQ: 1500 TABLET, EXTENDED RELEASE ORAL at 09:06

## 2019-06-05 RX ADMIN — AZTREONAM 1000 MG: 1 INJECTION, POWDER, LYOPHILIZED, FOR SOLUTION INTRAMUSCULAR; INTRAVENOUS at 06:06

## 2019-06-05 RX ADMIN — ASPIRIN 81 MG: 81 TABLET, COATED ORAL at 09:06

## 2019-06-05 RX ADMIN — CLOPIDOGREL BISULFATE 75 MG: 75 TABLET ORAL at 09:06

## 2019-06-05 NOTE — PLAN OF CARE
Problem: Adult Inpatient Plan of Care  Goal: Plan of Care Review  Outcome: Ongoing (interventions implemented as appropriate)  Patient continues to be monitored and treated. Patient visualized ambulating well with wife on telemetry floor. Patient and patient's spouse are pleasant and accepting of plan of care. Heart medications administered. IV fluids stopped. Patient insists on turning self in bed and refuses offered assistance. Patient verbalizes understanding of the importance of Q2H turns in order to combat skin breakdown. Patient's wife is attentive to patient's needs. Patient agrees to contact nurse should he being to experience any new or worsening symptoms. Will continue to monitor and treat.

## 2019-06-05 NOTE — SUBJECTIVE & OBJECTIVE
Interval History:  06/05/2019  The patient is post-operative day 5, status-post coronary artery bypass grafting x 5.    ROS  Medications:  Continuous Infusions:  Scheduled Meds:   aspirin  81 mg Oral Daily    atorvastatin  80 mg Oral Daily    aztreonam  1,000 mg Intravenous Q8H    chlorhexidine  10 mL Mouth/Throat BID    clopidogrel  75 mg Oral Daily    cyanocobalamin  1,000 mcg Oral Daily    ferrous sulfate  325 mg Oral BID    folic acid  1 mg Oral Daily    linezolid  600 mg Oral Q12H    metoprolol tartrate  12.5 mg Oral BID    nozaseptin   Each Nare BID    pantoprazole  40 mg Oral Daily    polyethylene glycol  17 g Oral Daily     PRN Meds:sodium chloride, acetaminophen, albumin human 5%, ceFAZolin (ANCEF) IVPB, lactated ringers, magnesium sulfate IVPB, metoclopramide HCl, ondansetron, ondansetron, potassium chloride in water **AND** potassium chloride in water **AND** potassium chloride in water, traMADol     Objective:     Vital Signs (Most Recent):  Temp: 97.8 °F (36.6 °C) (06/05/19 0749)  Pulse: 75 (06/05/19 0800)  Resp: 18 (06/05/19 0800)  BP: (!) 118/58 (06/05/19 0749)  SpO2: 96 % (06/05/19 0800) Vital Signs (24h Range):  Temp:  [97.5 °F (36.4 °C)-98.6 °F (37 °C)] 97.8 °F (36.6 °C)  Pulse:  [75-85] 75  Resp:  [16-18] 18  SpO2:  [92 %-97 %] 96 %  BP: ()/(50-58) 118/58     Weight: 77.5 kg (170 lb 13.7 oz)  Body mass index is 26.76 kg/m².    SpO2: 96 %  O2 Device (Oxygen Therapy): room air    Intake/Output - Last 3 Shifts       06/03 0700 - 06/04 0659 06/04 0700 - 06/05 0659 06/05 0700 - 06/06 0659    P.O.   354    I.V. (mL/kg)       IV Piggyback 50 50     Total Intake(mL/kg) 50 (0.6) 50 (0.6) 354 (4.6)    Urine (mL/kg/hr) 450 (0.2) 600 (0.3)     Stool 0      Chest Tube       Total Output 450 600     Net -400 -550 +354           Stool Occurrence 4 x            Lines/Drains/Airways     Line                 Pacer Wires 05/31/19 1530 4 days          Peripheral Intravenous Line                  Peripheral IV - Single Lumen 06/04/19 0640 20 G Posterior;Right Forearm 1 day                Physical Exam   Constitutional: He is oriented to person, place, and time. He appears well-developed and well-nourished. No distress.   HENT:   Head: Normocephalic and atraumatic.   Eyes: Pupils are equal, round, and reactive to light. EOM are normal.   Neck: Normal range of motion. Neck supple. No JVD present.   Cardiovascular: Normal rate, regular rhythm, normal heart sounds and intact distal pulses. Exam reveals no gallop and no friction rub.   No murmur heard.  Pulmonary/Chest: Effort normal and breath sounds normal. No stridor. No respiratory distress. He has no wheezes. He has no rales. He exhibits no tenderness.   Abdominal: Soft. Bowel sounds are normal. He exhibits no distension. There is no tenderness. There is no guarding.   Musculoskeletal: Normal range of motion. He exhibits no edema, tenderness or deformity.   Neurological: He is alert and oriented to person, place, and time. He displays normal reflexes. No cranial nerve deficit or sensory deficit. He exhibits normal muscle tone. Coordination normal.   Skin: Skin is warm and dry. Capillary refill takes less than 2 seconds. He is not diaphoretic.   Psychiatric: He has a normal mood and affect. His behavior is normal. Judgment and thought content normal.   Nursing note and vitals reviewed.      Significant Labs:  BMP:   Recent Labs   Lab 06/05/19  0508   GLU 99   *   K 3.6   CL 94*   CO2 30*   BUN 18   CREATININE 0.8   CALCIUM 8.3*   MG 2.1     CBC:   Recent Labs   Lab 06/05/19  0508   WBC 10.64   RBC 2.82*   HGB 8.5*   HCT 24.3*      MCV 86   MCH 30.1   MCHC 35.0       Significant Diagnostics:  I have reviewed and interpreted all pertinent imaging results/findings within the past 24 hours.

## 2019-06-05 NOTE — SUBJECTIVE & OBJECTIVE
Review of Systems   Constitution: Positive for malaise/fatigue.   HENT: Negative.    Eyes: Negative.    Cardiovascular: Negative.    Respiratory: Negative.    Endocrine: Negative.    Hematologic/Lymphatic: Negative.    Skin: Negative.    Musculoskeletal: Negative.    Gastrointestinal: Negative.    Genitourinary: Negative.    Neurological: Negative.    Psychiatric/Behavioral: Negative.    Allergic/Immunologic: Negative.      Objective:     Vital Signs (Most Recent):  Temp: 98 °F (36.7 °C) (06/05/19 1107)  Pulse: 73 (06/05/19 1107)  Resp: 17 (06/05/19 1107)  BP: (!) 104/55 (06/05/19 1107)  SpO2: 98 % (06/05/19 1107) Vital Signs (24h Range):  Temp:  [97.5 °F (36.4 °C)-98.6 °F (37 °C)] 98 °F (36.7 °C)  Pulse:  [73-85] 73  Resp:  [16-18] 17  SpO2:  [92 %-98 %] 98 %  BP: (102-118)/(50-58) 104/55     Weight: 77.5 kg (170 lb 13.7 oz)  Body mass index is 26.76 kg/m².     SpO2: 98 %  O2 Device (Oxygen Therapy): room air      Intake/Output Summary (Last 24 hours) at 6/5/2019 1335  Last data filed at 6/5/2019 0900  Gross per 24 hour   Intake 354 ml   Output 175 ml   Net 179 ml       Lines/Drains/Airways     Line                 Pacer Wires 05/31/19 1530 4 days          Peripheral Intravenous Line                 Peripheral IV - Single Lumen 06/04/19 0640 20 G Posterior;Right Forearm 1 day                Physical Exam   Constitutional: He is oriented to person, place, and time. He appears well-developed and well-nourished. No distress.   HENT:   Head: Normocephalic and atraumatic.   Eyes: Pupils are equal, round, and reactive to light. Right eye exhibits no discharge. Left eye exhibits no discharge.   Neck: Neck supple. No JVD present.   Cardiovascular: Normal rate, regular rhythm, S1 normal, S2 normal and normal heart sounds.   No murmur heard.  Pulmonary/Chest: Effort normal and breath sounds normal. No respiratory distress. He has no wheezes.   Sternotomy site C/D/I; no bleeding erythema or drainage   Abdominal: Soft. He  exhibits no distension.   Musculoskeletal: He exhibits no edema.   Neurological: He is alert and oriented to person, place, and time.   Skin: Skin is warm and dry. He is not diaphoretic. No erythema.   Left leg SVG sites with moderate bruising   Psychiatric: He has a normal mood and affect. His behavior is normal. Thought content normal.   Nursing note and vitals reviewed.      Significant Labs:   CMP   Recent Labs   Lab 06/04/19  0534 06/05/19  0508   * 130*   K 3.6 3.6   CL 92* 94*   CO2 31* 30*    99   BUN 23 18   CREATININE 0.9 0.8   CALCIUM 8.8 8.3*   ANIONGAP 9 6*   ESTGFRAFRICA >60 >60   EGFRNONAA >60 >60   , CBC   Recent Labs   Lab 06/04/19  0534 06/05/19  0508   WBC 12.47 10.64   HGB 9.0* 8.5*   HCT 25.5* 24.3*    184   , Troponin No results for input(s): TROPONINI in the last 48 hours. and All pertinent lab results from the last 24 hours have been reviewed.    Significant Imaging: Echocardiogram:   2D echo with color flow doppler:   Results for orders placed or performed during the hospital encounter of 05/29/19   2D echo with color flow doppler   Result Value Ref Range    QEF 60 55 - 65    Diastolic Dysfunction No     Est. PA Systolic Pressure 30.46    , EKG: Reviewed and X-Ray: CXR: X-Ray Chest 1 View (CXR): No results found for this visit on 05/29/19. and X-Ray Chest PA and Lateral (CXR): No results found for this visit on 05/29/19.

## 2019-06-05 NOTE — ASSESSMENT & PLAN NOTE
CABG x 5V per CT surgery  Intubated, sedated in ICU today post surgery  Mgmt per CT surgery  Continue ASA, Statin, BB  Resume ARB once OK with CT surgery  Will continue to follow along    6/3/19  -Stable overnight  -Continue ASA, statin, BB  -Resume ARB when ok with CVT  -Ambulation and IS usage    6/4/19  -Remains stable CV wise  -Continue ASA, Plavix, statin, BB  -Resume ARB when ok with CVT    6/5/19  -No acute events  -Stable  -Continue ASA, Plavix, statin, BB  -Resume ARB when ok with CVT  -Ambulation and IS usage

## 2019-06-05 NOTE — ASSESSMENT & PLAN NOTE
06/01/2019  The patient is post-operative day 1, status-post coronary artery bypass grafting x 5.  Overall the patient is doing well.    Neuro:  The patient is awake alert and oriented x3.  Neuro exam is nonfocal.  Pain is well controlled. D/C fentanyl.   Cardiac:  Patient is off all gtts.  Excellent cardiac index.  Patient will be started on metoprolol.   Respiratory: Good sats on nasal cannula.  Continue respiratory toilet, continue incentive spirometer.  GI:  Advance diet as tolerated. Cardiac diet.    Renal:  Good urine output. Cr 1.1. K 3.8. K replaced. Mag 2.6.  Heme:  Hct 20.  Plt 124. Administer 2 units PRBC. Continue Aspirin and Plavix.   Id:  White count is 14. Tmax 100.8  Suspect secondary to stress of surgery. Will continue to follow.  Endocrine:  Glucose is controlled with insulin gtt.  Discontinue insulin gtt. Start sliding scale insulin.   Activities:  Patient is out of bed to chair.  Advance activities as tolerated.  Lines tubes and drains:  Discontinue [place] and Cordis.  Discontinue A-line. Discontinue cordero catheter. Discontinue AV x2. Continue chest tubes x 4 for now.  Continue pacer wires. Start midline.          06/02/2019  The patient is post-operative day 2, status-post coronary artery bypass grafting x 5.  Overall the patient is doing well. Anticipate transfer to telemetry today.    Neuro:  The patient is awake alert and oriented x3.  Neuro exam is nonfocal.  Pain is well controlled. D/C oxycodone. Start tramadol.   Cardiac: Hemodynamically stable. Continue metoprolol at current dose.   Respiratory: Good sats on nasal cannula.  Continue respiratory toilet, continue incentive spirometer. Wean to room air.   GI:  Advance diet as tolerated. Cardiac diet.    Renal:  Good urine output. Cr 1.0. K 5.0. Hold K today. Mag 2.4. Once dose of Metolazone. Repeat BMP at 1300.    Heme:  Hct 27.8.  Plt 114. Continue Aspirin and Plavix.   Id:  White count is 22. Tmax 100.4  Suspect secondary to stress of  surgery. Will continue to follow.  Endocrine:  Glucose is well controlled with sliding scale insulin.   Activities:  Patient is out of bed to chair.  Advance activities as tolerated.  Lines tubes and drains: Discontinue chest tubes x4. Discontinue cordero catheter. Continue pacer wires. Continue midline.  Plan: Anticipate transfer to telemetry today.          06/03/2019  The patient is post-operative day 3, status-post coronary artery bypass grafting x 5.  Overall the patient is doing well. Anticipate discharge to home health in 24-48 hours.     Neuro:  The patient is awake alert and oriented x3.  Neuro exam is nonfocal.  Pain is well controlled.   Cardiac: Hemodynamically stable. Continue metoprolol at current dose.   Respiratory: Good sats on nasal cannula.  Continue respiratory toilet, continue incentive spirometer. Wean to room air.   GI:  Advance diet as tolerated. Cardiac diet. Regular BM x2 yesterday.   Renal:  Good urine output. Cr 0.9. K 3.9. PO Lasix BID, PO K BID.   Heme:  CBC pending. Continue Aspirin and Plavix.   Id:  CBC pending. Tmax 99.8. ID on board r/t increased WBC. On linezolid and aztreonam. Will continue to follow.  Endocrine:  Glucose is well controlled with sliding scale insulin.   Activities:  Patient is out of bed to chair.  Advance activities as tolerated. Increase PT. Increase ambulation.   Lines tubes and drains: Continue pacer wires. Continue midline.  Plan: Anticipate discharge to home health in 24-48 hours.          06/04/2019  The patient is post-operative day 4, status-post coronary artery bypass grafting x 5.  Overall the patient is doing well. Anticipate discharge to home health in 24 hours.     Neuro:  The patient is awake alert and oriented x3.  Neuro exam is nonfocal.  Pain is well controlled.   Cardiac: Systolic BP consistently less than 100, asymptomatic. LR infusion. Decrease metoprolol dose.   Respiratory: Good sats on nasal cannula.  Continue respiratory toilet, continue  incentive spirometer. Change to room air.   GI:  Advance diet as tolerated. Cardiac diet. Regular BM x2 yesterday.   Renal:  Good urine output. Cr 0.9. K 3.6. Mag 1.7. Replace K. Replace Mag. Stop diuresis.   Heme:  Hct 25.5. Plt 159. Continue Aspirin and Plavix.   Id:  WBC down to 12.4. Tmax 100. ID on board. On linezolid and aztreonam. Will continue to follow.  Endocrine:  Glucose is well controlled with sliding scale insulin.   Activities:  Patient is out of bed to chair.  Advance activities as tolerated. Increase PT. Increase ambulation.   Lines tubes and drains: Discontinue pacer wires. Continue midline.  Plan: Anticipate discharge to home health in 24 hours.         06/05/2019  The patient is post-operative day 5, status-post coronary artery bypass grafting x 5.  Overall the patient is doing well. Anticipate discharge to home health today.    Neuro:  The patient is awake alert and oriented x3.  Neuro exam is nonfocal.  Pain is well controlled.   Cardiac: Hemodynamically stable. Continue metoprolol dose.   Respiratory: Good sats on room air.  Continue incentive spirometer.  GI: Tolerating cardiac diet. Regular BMs.   Renal:  Good urine output. Cr 0.8. K 3.6. Mag 2.1. Replace K.  Heme:  Hct 24.3. Plt 184. Continue Aspirin and Plavix, folic acid, vit b12, and iron.    Id:  WBC down to 10.6 Tmax 97.5. Pt will be discharged on linezolid and augmentin x 4 days.   Endocrine:  Glucose is well controlled.   Activities:  Patient is out of bed to chair.  Advance activities as tolerated. Excellent progress with PT. Excellent ambulation.   Lines tubes and drains: Discontinue midline prior to discharge.   Plan: Anticipate discharge to home health today.

## 2019-06-05 NOTE — ASSESSMENT & PLAN NOTE
06/01/2019  The patient is post-operative day 1, status-post coronary artery bypass grafting x 5.  Overall the patient is doing well.    Neuro:  The patient is awake alert and oriented x3.  Neuro exam is nonfocal.  Pain is well controlled. D/C fentanyl.   Cardiac:  Patient is off all gtts.  Excellent cardiac index.  Patient will be started on metoprolol.   Respiratory: Good sats on nasal cannula.  Continue respiratory toilet, continue incentive spirometer.  GI:  Advance diet as tolerated. Cardiac diet.    Renal:  Good urine output. Cr 1.1. K 3.8. K replaced. Mag 2.6.  Heme:  Hct 20.  Plt 124. Administer 2 units PRBC. Continue Aspirin and Plavix.   Id:  White count is 14. Tmax 100.8  Suspect secondary to stress of surgery. Will continue to follow.  Endocrine:  Glucose is controlled with insulin gtt.  Discontinue insulin gtt. Start sliding scale insulin.   Activities:  Patient is out of bed to chair.  Advance activities as tolerated.  Lines tubes and drains:  Discontinue [place] and Cordis.  Discontinue A-line. Discontinue cordero catheter. Discontinue AV x2. Continue chest tubes x 4 for now.  Continue pacer wires. Start midline.          06/02/2019  The patient is post-operative day 2, status-post coronary artery bypass grafting x 5.  Overall the patient is doing well. Anticipate transfer to telemetry today.    Neuro:  The patient is awake alert and oriented x3.  Neuro exam is nonfocal.  Pain is well controlled. D/C oxycodone. Start tramadol.   Cardiac: Hemodynamically stable. Continue metoprolol at current dose.   Respiratory: Good sats on nasal cannula.  Continue respiratory toilet, continue incentive spirometer. Wean to room air.   GI:  Advance diet as tolerated. Cardiac diet.    Renal:  Good urine output. Cr 1.0. K 5.0. Hold K today. Mag 2.4. Once dose of Metolazone. Repeat BMP at 1300.    Heme:  Hct 27.8.  Plt 114. Continue Aspirin and Plavix.   Id:  White count is 22. Tmax 100.4  Suspect secondary to stress of  surgery. Will continue to follow.  Endocrine:  Glucose is well controlled with sliding scale insulin.   Activities:  Patient is out of bed to chair.  Advance activities as tolerated.  Lines tubes and drains: Discontinue chest tubes x4. Discontinue cordero catheter. Continue pacer wires. Continue midline.  Plan: Anticipate transfer to telemetry today.          06/03/2019  The patient is post-operative day 3, status-post coronary artery bypass grafting x 5.  Overall the patient is doing well. Anticipate discharge to home health in 24-48 hours.     Neuro:  The patient is awake alert and oriented x3.  Neuro exam is nonfocal.  Pain is well controlled.   Cardiac: Hemodynamically stable. Continue metoprolol at current dose.   Respiratory: Good sats on nasal cannula.  Continue respiratory toilet, continue incentive spirometer. Wean to room air.   GI:  Advance diet as tolerated. Cardiac diet. Regular BM x2 yesterday.   Renal:  Good urine output. Cr 0.9. K 3.9. PO Lasix BID, PO K BID.   Heme:  CBC pending. Continue Aspirin and Plavix.   Id:  CBC pending. Tmax 99.8. ID on board r/t increased WBC. On linezolid and aztreonam. Will continue to follow.  Endocrine:  Glucose is well controlled with sliding scale insulin.   Activities:  Patient is out of bed to chair.  Advance activities as tolerated. Increase PT. Increase ambulation.   Lines tubes and drains: Continue pacer wires. Continue midline.  Plan: Anticipate discharge to home health in 24-48 hours.          06/04/2019  The patient is post-operative day 4, status-post coronary artery bypass grafting x 5.  Overall the patient is doing well. Anticipate discharge to home health in 24 hours.     Neuro:  The patient is awake alert and oriented x3.  Neuro exam is nonfocal.  Pain is well controlled.   Cardiac: Systolic BP consistently less than 100, asymptomatic. LR infusion. Decrease metoprolol dose.   Respiratory: Good sats on nasal cannula.  Continue respiratory toilet, continue  incentive spirometer. Change to room air.   GI:  Advance diet as tolerated. Cardiac diet. Regular BM x2 yesterday.   Renal:  Good urine output. Cr 0.9. K 3.6. Mag 1.7. Replace K. Replace Mag. Stop diuresis.   Heme:  Hct 25.5. Plt 159. Continue Aspirin and Plavix.   Id:  WBC down to 12.4. Tmax 100. ID on board. On linezolid and aztreonam. Will continue to follow.  Endocrine:  Glucose is well controlled with sliding scale insulin.   Activities:  Patient is out of bed to chair.  Advance activities as tolerated. Increase PT. Increase ambulation.   Lines tubes and drains: Discontinue pacer wires. Continue midline.  Plan: Anticipate discharge to home health in 24 hours.         06/05/2019  The patient is post-operative day 5, status-post coronary artery bypass grafting x 5.  Overall the patient is doing well. Anticipate discharge to home health today.    Neuro:  The patient is awake alert and oriented x3.  Neuro exam is nonfocal.  Pain is well controlled.   Cardiac: Hemodynamically stable. Continue metoprolol dose.   Respiratory: Good sats on room air.  Continue incentive spirometer.  GI: Tolerating cardiac diet. Regular BMs.   Renal:  Good urine output. Cr 0.8. K 3.6. Mag 2.1. Replace K.  Heme:  Hct 24.3. Plt 184. Continue Aspirin and Plavix, folic acid, vit b12, and iron.    Id:  WBC down to 10.6 Tmax 97.5. Pt will be discharged on linezolid and augmentin x 4 days.   Endocrine:  Glucose is well controlled.   Activities:  Patient is out of bed to chair.  Advance activities as tolerated. Excellent progress with PT. Excellent ambulation.   Lines tubes and drains: Discontinue midline prior to discharge.   Plan: Anticipate discharge to home health today.

## 2019-06-05 NOTE — PLAN OF CARE
06/05/19 1051   Post-Acute Status   Post-Acute Authorization Home Health/Hospice   Home Health/Hospice Status Set-up Complete

## 2019-06-05 NOTE — PROGRESS NOTES
[facility] -   Cardiothoracic Surgery  Progress Note    Patient Name: Chapin Calderon  MRN: [id number]  Admission Date: 5/29/2019  Hospital Length of Stay: 7 days  Code Status: Full Code   Attending Physician: Mani Dorado MD   Referring Provider: Self, Aaareferral  Principal Problem:NSTEMI (non-ST elevated myocardial infarction)            Subjective:     Post-Op Info:  Procedure(s) (LRB):  CORONARY ARTERY BYPASS GRAFT (CABG) (N/A)  ECHOCARDIOGRAM,TRANSESOPHAGEAL (N/A)  SURGICAL PROCUREMENT, VEIN, ENDOSCOPIC (Bilateral)   5 Days Post-Op     Interval History:  06/05/2019  The patient is post-operative day 5, status-post coronary artery bypass grafting x 5.    ROS  Medications:  Continuous Infusions:  Scheduled Meds:   aspirin  81 mg Oral Daily    atorvastatin  80 mg Oral Daily    aztreonam  1,000 mg Intravenous Q8H    chlorhexidine  10 mL Mouth/Throat BID    clopidogrel  75 mg Oral Daily    cyanocobalamin  1,000 mcg Oral Daily    ferrous sulfate  325 mg Oral BID    folic acid  1 mg Oral Daily    linezolid  600 mg Oral Q12H    metoprolol tartrate  12.5 mg Oral BID    nozaseptin   Each Nare BID    pantoprazole  40 mg Oral Daily    polyethylene glycol  17 g Oral Daily     PRN Meds:sodium chloride, acetaminophen, albumin human 5%, ceFAZolin (ANCEF) IVPB, lactated ringers, magnesium sulfate IVPB, metoclopramide HCl, ondansetron, ondansetron, potassium chloride in water **AND** potassium chloride in water **AND** potassium chloride in water, traMADol     Objective:     Vital Signs (Most Recent):  Temp: 97.8 °F (36.6 °C) (06/05/19 0749)  Pulse: 75 (06/05/19 0800)  Resp: 18 (06/05/19 0800)  BP: (!) 118/58 (06/05/19 0749)  SpO2: 96 % (06/05/19 0800) Vital Signs (24h Range):  Temp:  [97.5 °F (36.4 °C)-98.6 °F (37 °C)] 97.8 °F (36.6 °C)  Pulse:  [75-85] 75  Resp:  [16-18] 18  SpO2:  [92 %-97 %] 96 %  BP: ()/(50-58) 118/58     Weight: 77.5 kg (170 lb 13.7 oz)  Body mass index is 26.76  kg/m².    SpO2: 96 %  O2 Device (Oxygen Therapy): room air    Intake/Output - Last 3 Shifts       06/03 0700 - 06/04 0659 06/04 0700 - 06/05 0659 06/05 0700 - 06/06 0659    P.O.   354    I.V. (mL/kg)       IV Piggyback 50 50     Total Intake(mL/kg) 50 (0.6) 50 (0.6) 354 (4.6)    Urine (mL/kg/hr) 450 (0.2) 600 (0.3)     Stool 0      Chest Tube       Total Output 450 600     Net -400 -550 +354           Stool Occurrence 4 x            Lines/Drains/Airways     Line                 Pacer Wires 05/31/19 1530 4 days          Peripheral Intravenous Line                 Peripheral IV - Single Lumen 06/04/19 0640 20 G Posterior;Right Forearm 1 day                Physical Exam   Constitutional: He is oriented to person, place, and time. He appears well-developed and well-nourished. No distress.   HENT:   Head: Normocephalic and atraumatic.   Eyes: Pupils are equal, round, and reactive to light. EOM are normal.   Neck: Normal range of motion. Neck supple. No JVD present.   Cardiovascular: Normal rate, regular rhythm, normal heart sounds and intact distal pulses. Exam reveals no gallop and no friction rub.   No murmur heard.  Pulmonary/Chest: Effort normal and breath sounds normal. No stridor. No respiratory distress. He has no wheezes. He has no rales. He exhibits no tenderness.   Abdominal: Soft. Bowel sounds are normal. He exhibits no distension. There is no tenderness. There is no guarding.   Musculoskeletal: Normal range of motion. He exhibits no edema, tenderness or deformity.   Neurological: He is alert and oriented to person, place, and time. He displays normal reflexes. No cranial nerve deficit or sensory deficit. He exhibits normal muscle tone. Coordination normal.   Skin: Skin is warm and dry. Capillary refill takes less than 2 seconds. He is not diaphoretic.   Psychiatric: He has a normal mood and affect. His behavior is normal. Judgment and thought content normal.   Nursing note and vitals  reviewed.      Significant Labs:  BMP:   Recent Labs   Lab 06/05/19  0508   GLU 99   *   K 3.6   CL 94*   CO2 30*   BUN 18   CREATININE 0.8   CALCIUM 8.3*   MG 2.1     CBC:   Recent Labs   Lab 06/05/19  0508   WBC 10.64   RBC 2.82*   HGB 8.5*   HCT 24.3*      MCV 86   MCH 30.1   MCHC 35.0       Significant Diagnostics:  I have reviewed and interpreted all pertinent imaging results/findings within the past 24 hours.    Assessment/Plan:     S/P CABG x 5       06/01/2019  The patient is post-operative day 1, status-post coronary artery bypass grafting x 5.  Overall the patient is doing well.    Neuro:  The patient is awake alert and oriented x3.  Neuro exam is nonfocal.  Pain is well controlled. D/C fentanyl.   Cardiac:  Patient is off all gtts.  Excellent cardiac index.  Patient will be started on metoprolol.   Respiratory: Good sats on nasal cannula.  Continue respiratory toilet, continue incentive spirometer.  GI:  Advance diet as tolerated. Cardiac diet.    Renal:  Good urine output. Cr 1.1. K 3.8. K replaced. Mag 2.6.  Heme:  Hct 20.  Plt 124. Administer 2 units PRBC. Continue Aspirin and Plavix.   Id:  White count is 14. Tmax 100.8  Suspect secondary to stress of surgery. Will continue to follow.  Endocrine:  Glucose is controlled with insulin gtt.  Discontinue insulin gtt. Start sliding scale insulin.   Activities:  Patient is out of bed to chair.  Advance activities as tolerated.  Lines tubes and drains:  Discontinue [place] and Cordis.  Discontinue A-line. Discontinue cordero catheter. Discontinue AV x2. Continue chest tubes x 4 for now.  Continue pacer wires. Start midline.          06/02/2019  The patient is post-operative day 2, status-post coronary artery bypass grafting x 5.  Overall the patient is doing well. Anticipate transfer to telemetry today.    Neuro:  The patient is awake alert and oriented x3.  Neuro exam is nonfocal.  Pain is well controlled. D/C oxycodone. Start tramadol.   Cardiac:  Hemodynamically stable. Continue metoprolol at current dose.   Respiratory: Good sats on nasal cannula.  Continue respiratory toilet, continue incentive spirometer. Wean to room air.   GI:  Advance diet as tolerated. Cardiac diet.    Renal:  Good urine output. Cr 1.0. K 5.0. Hold K today. Mag 2.4. Once dose of Metolazone. Repeat BMP at 1300.    Heme:  Hct 27.8.  Plt 114. Continue Aspirin and Plavix.   Id:  White count is 22. Tmax 100.4  Suspect secondary to stress of surgery. Will continue to follow.  Endocrine:  Glucose is well controlled with sliding scale insulin.   Activities:  Patient is out of bed to chair.  Advance activities as tolerated.  Lines tubes and drains: Discontinue chest tubes x4. Discontinue cordero catheter. Continue pacer wires. Continue midline.  Plan: Anticipate transfer to telemetry today.          06/03/2019  The patient is post-operative day 3, status-post coronary artery bypass grafting x 5.  Overall the patient is doing well. Anticipate discharge to home health in 24-48 hours.     Neuro:  The patient is awake alert and oriented x3.  Neuro exam is nonfocal.  Pain is well controlled.   Cardiac: Hemodynamically stable. Continue metoprolol at current dose.   Respiratory: Good sats on nasal cannula.  Continue respiratory toilet, continue incentive spirometer. Wean to room air.   GI:  Advance diet as tolerated. Cardiac diet. Regular BM x2 yesterday.   Renal:  Good urine output. Cr 0.9. K 3.9. PO Lasix BID, PO K BID.   Heme:  CBC pending. Continue Aspirin and Plavix.   Id:  CBC pending. Tmax 99.8. ID on board r/t increased WBC. On linezolid and aztreonam. Will continue to follow.  Endocrine:  Glucose is well controlled with sliding scale insulin.   Activities:  Patient is out of bed to chair.  Advance activities as tolerated. Increase PT. Increase ambulation.   Lines tubes and drains: Continue pacer wires. Continue midline.  Plan: Anticipate discharge to home health in 24-48 hours.           06/04/2019  The patient is post-operative day 4, status-post coronary artery bypass grafting x 5.  Overall the patient is doing well. Anticipate discharge to home health in 24 hours.     Neuro:  The patient is awake alert and oriented x3.  Neuro exam is nonfocal.  Pain is well controlled.   Cardiac: Systolic BP consistently less than 100, asymptomatic. LR infusion. Decrease metoprolol dose.   Respiratory: Good sats on nasal cannula.  Continue respiratory toilet, continue incentive spirometer. Change to room air.   GI:  Advance diet as tolerated. Cardiac diet. Regular BM x2 yesterday.   Renal:  Good urine output. Cr 0.9. K 3.6. Mag 1.7. Replace K. Replace Mag. Stop diuresis.   Heme:  Hct 25.5. Plt 159. Continue Aspirin and Plavix.   Id:  WBC down to 12.4. Tmax 100. ID on board. On linezolid and aztreonam. Will continue to follow.  Endocrine:  Glucose is well controlled with sliding scale insulin.   Activities:  Patient is out of bed to chair.  Advance activities as tolerated. Increase PT. Increase ambulation.   Lines tubes and drains: Discontinue pacer wires. Continue midline.  Plan: Anticipate discharge to home health in 24 hours.         06/05/2019  The patient is post-operative day 5, status-post coronary artery bypass grafting x 5.  Overall the patient is doing well. Anticipate discharge to home health today.    Neuro:  The patient is awake alert and oriented x3.  Neuro exam is nonfocal.  Pain is well controlled.   Cardiac: Hemodynamically stable. Continue metoprolol dose.   Respiratory: Good sats on room air.   Continue incentive spirometer.  GI: Tolerating cardiac diet. Regular BMs.   Renal:  Good urine output. Cr 0.8. K 3.6. Mag 2.1. Replace K.  Heme:  Hct 24.3. Plt 184. Continue Aspirin and Plavix, folic acid, vit b12, and iron.    Id:  WBC down to 10.6 Tmax 97.5. Pt will be discharged on linezolid and augmentin x 4 days.   Endocrine:  Glucose is well controlled.   Activities:  Patient is out of bed to chair.   Advance activities as tolerated. Excellent progress with PT. Excellent ambulation.   Lines tubes and drains: Discontinue midline prior to discharge.   Plan: Anticipate discharge to home health today.        Coronary artery disease involving native coronary artery of native heart with angina pectoris  05/29/2019  The patient is a 73 year old male with 80% proximal LAD stenosis that was worked up for NSTEMI. The patient has a history of prostate cancer. The patient with 80% proximal LAD stenosis and multivessel coronary artery disease is a candidate for urgent coronary artery bypass grafting. The risks and benefits of surgery have been explained to the patient, his wife, and his adult daughter, in great detail. All questions have been answered to the patient's satisfaction. The patient has agreed to go ahead with coronary artery bypass grafting, which will be scheduled for 05/31/2019.         Duane Carpenter NP  Cardiothoracic Surgery  [facility] [place]

## 2019-06-05 NOTE — ASSESSMENT & PLAN NOTE
-Recovering s/p CABG  -Continue current mgmt    6/4/19  -Remains stable CV wise  -Continue ASA, Plavix, BB, statin  -Resume ACEi/ARB when ok with CVT    6/5/19  -Continue ASA, Plavix, BB, statin  -Needs ARB on board when ok with CVT

## 2019-06-05 NOTE — PROGRESS NOTES
[facility]  Cardiology  Progress Note    Patient Name: Chapin Calderon  MRN: [id number]  Admission Date: 5/29/2019  Hospital Length of Stay: 7 days  Code Status: Full Code   Attending Physician: No att. providers found   Primary Care Physician: Tiffanie Vicente MD  Expected Discharge Date: 6/5/2019  Principal Problem:NSTEMI (non-ST elevated myocardial infarction)    Subjective:   HPI:  Pt c/o chest pain, mid chest, all day yesterday which prompted him to go to ER overnight.  Cp much improved now, seems mostly resolved.  ecg shows NSR, low voltage, possible septal infarct.  Troponin 6.2 on initial labs.   .  No prior h/o CAD.  Active male with no significant health issues otherwise per pt.    Hospital Course:   5/30/19--Patient seen and examined in room, lying in bed. Denies chest pain or SOB today. IV heparin gtt in place. Plans for CABG tomorrow per CT surgery. Labs reviewed, K+ 4.5, Cr 0.8, Mag 1.9.     5/31/19--Patient seen and examined in ICU, intubated and sedated post CABG. CABG x 5V per Dr. Dorado today. Labs reviewed, INR 1.2, K+ 4.2, Cr 1.0, Mag 3.8, H/H 9/25.1.     6/1/19:  PT SEEN AND EXAMINED IN ICU.  EXTUBATED.  NO ANGINA.  USUAL POST OP DISCOMFORT.  NO DYSPNEA.  LABS SHOWS SEVERE POST OP ANEMIA.  RECEIVING PRBC THIS AM.    6/2/19:  PT SEEN AND EXAMINED IN ICU.  NO ACUTE CV ISSUES NOTED.  CHEST TUBES BEING PULLED THIS AM.  HG IMPROVED. NO ACTIVE ANGINA OR CHF SXS. APPROPRIATE POST OP PAIN.    6/3/19-Patient seen and examined today. Feels well. Ambulated this AM. No dmitry chest pain. Labs reviewed, WBC elevated.     6/4/19-Patient seen and examined today. Uneventful night. No complaints. Labs stable.     6/5/19-Patient seen and examined today, resting in bed. Stable CV wise. No chest pain or SOB. Labs reviewed and are stable.         Review of Systems   Constitution: Positive for malaise/fatigue.   HENT: Negative.    Eyes: Negative.    Cardiovascular: Negative.    Respiratory:  Negative.    Endocrine: Negative.    Hematologic/Lymphatic: Negative.    Skin: Negative.    Musculoskeletal: Negative.    Gastrointestinal: Negative.    Genitourinary: Negative.    Neurological: Negative.    Psychiatric/Behavioral: Negative.    Allergic/Immunologic: Negative.      Objective:     Vital Signs (Most Recent):  Temp: 98 °F (36.7 °C) (06/05/19 1107)  Pulse: 73 (06/05/19 1107)  Resp: 17 (06/05/19 1107)  BP: (!) 104/55 (06/05/19 1107)  SpO2: 98 % (06/05/19 1107) Vital Signs (24h Range):  Temp:  [97.5 °F (36.4 °C)-98.6 °F (37 °C)] 98 °F (36.7 °C)  Pulse:  [73-85] 73  Resp:  [16-18] 17  SpO2:  [92 %-98 %] 98 %  BP: (102-118)/(50-58) 104/55     Weight: 77.5 kg (170 lb 13.7 oz)  Body mass index is 26.76 kg/m².     SpO2: 98 %  O2 Device (Oxygen Therapy): room air      Intake/Output Summary (Last 24 hours) at 6/5/2019 1335  Last data filed at 6/5/2019 0900  Gross per 24 hour   Intake 354 ml   Output 175 ml   Net 179 ml       Lines/Drains/Airways     Line                 Pacer Wires 05/31/19 1530 4 days          Peripheral Intravenous Line                 Peripheral IV - Single Lumen 06/04/19 0640 20 G Posterior;Right Forearm 1 day                Physical Exam   Constitutional: He is oriented to person, place, and time. He appears well-developed and well-nourished. No distress.   HENT:   Head: Normocephalic and atraumatic.   Eyes: Pupils are equal, round, and reactive to light. Right eye exhibits no discharge. Left eye exhibits no discharge.   Neck: Neck supple. No JVD present.   Cardiovascular: Normal rate, regular rhythm, S1 normal, S2 normal and normal heart sounds.   No murmur heard.  Pulmonary/Chest: Effort normal and breath sounds normal. No respiratory distress. He has no wheezes.   Sternotomy site C/D/I; no bleeding erythema or drainage   Abdominal: Soft. He exhibits no distension.   Musculoskeletal: He exhibits no edema.   Neurological: He is alert and oriented to person, place, and time.   Skin: Skin is  warm and dry. He is not diaphoretic. No erythema.   Left leg SVG sites with moderate bruising   Psychiatric: He has a normal mood and affect. His behavior is normal. Thought content normal.   Nursing note and vitals reviewed.      Significant Labs:   CMP   Recent Labs   Lab 06/04/19  0534 06/05/19  0508   * 130*   K 3.6 3.6   CL 92* 94*   CO2 31* 30*    99   BUN 23 18   CREATININE 0.9 0.8   CALCIUM 8.8 8.3*   ANIONGAP 9 6*   ESTGFRAFRICA >60 >60   EGFRNONAA >60 >60   , CBC   Recent Labs   Lab 06/04/19  0534 06/05/19  0508   WBC 12.47 10.64   HGB 9.0* 8.5*   HCT 25.5* 24.3*    184   , Troponin No results for input(s): TROPONINI in the last 48 hours. and All pertinent lab results from the last 24 hours have been reviewed.    Significant Imaging: Echocardiogram:   2D echo with color flow doppler:   Results for orders placed or performed during the hospital encounter of 05/29/19   2D echo with color flow doppler   Result Value Ref Range    QEF 60 55 - 65    Diastolic Dysfunction No     Est. PA Systolic Pressure 30.46    , EKG: Reviewed and X-Ray: CXR: X-Ray Chest 1 View (CXR): No results found for this visit on 05/29/19. and X-Ray Chest PA and Lateral (CXR): No results found for this visit on 05/29/19.    Assessment and Plan:   Patient recovering s/p CABG x 5. Remains stable. Continue same meds/mgmt. Needs ACEi/ARB when ok with CVT. Follow-up in clinic.    Anemia following surgery  -Stable  -Mgmt as per CVT    CAD, multiple vessel  -Recovering s/p CABG  -Continue current mgmt    6/4/19  -Remains stable CV wise  -Continue ASA, Plavix, BB, statin  -Resume ACEi/ARB when ok with CVT    6/5/19  -Continue ASA, Plavix, BB, statin  -Needs ARB on board when ok with CVT    S/P CABG x 5  CABG x 5V per CT surgery  Intubated, sedated in ICU today post surgery  Mgmt per CT surgery  Continue ASA, Statin, BB  Resume ARB once OK with CT surgery  Will continue to follow along    6/3/19  -Stable overnight  -Continue  ASA, statin, BB  -Resume ARB when ok with CVT  -Ambulation and IS usage    6/4/19  -Remains stable CV wise  -Continue ASA, Plavix, statin, BB  -Resume ARB when ok with CVT    6/5/19  -No acute events  -Stable  -Continue ASA, Plavix, statin, BB  -Resume ARB when ok with CVT  -Ambulation and IS usage    Coronary artery disease involving native coronary artery of native heart with angina pectoris  -See plan for s/p CABG    AP (angina pectoris)  See management plan detailed above.     Abnormal ECG  See management plan detailed above.         VTE Risk Mitigation (From admission, onward)        Ordered     IP VTE LOW RISK PATIENT  Once      05/30/19 0752     heparin 25,000 units in dextrose 5% 250 mL (100 units/mL) infusion LOW INTENSITY nomogram - [facility]  Continuous      05/29/19 2001     Reason for no Mechanical VTE Prophylaxis  Once      05/29/19 1431          Denise Lugo PA-C  Cardiology  [facility]     Chart reviewed. Dr. Walters examined patient and agrees with plan as outlined above.

## 2019-06-05 NOTE — PLAN OF CARE
06/05/19 1548   Final Note   Assessment Type Final Discharge Note   Anticipated Discharge Disposition Home-Health   Right Care Referral Info   Post Acute Recommendation Home-care   Facility Name [facility]

## 2019-06-05 NOTE — DISCHARGE SUMMARY
"[facility] -   Cardiothoracic Surgery  Discharge Summary      Patient Name: Chapin Calderon  MRN: [id number]  Admission Date: 5/29/2019  Hospital Length of Stay: 7 days  Discharge Date and Time:  06/05/2019 10:23 AM  Attending Physician: Mani Dorado MD   Discharging Provider: Duane Carpenter NP  Primary Care Provider: Tiffanie Vicente MD    HPI:   05/29/2019  The patient is a 73 year old male with 80% proximal LAD stenosis that was worked up for NSTEMI. The patient has a history of prostate cancer. The patient was in his usual state of health prior to 1 day ago, when he had an episode of mid-sternal chest pain that began while he was eating breakfast. Pain described as a "pressure". The patient reports that this chest pain episode lasted approximately 12 hours. The patient denies any episodes of this nature occurring prior to the mentioned episode 1 day ago. The patient reports that prior to yesterday, he has been living an active lifestyle. 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 The patient arrived to the ED today for his complaints. Trop 7.465. . Patient had a cardiac catheterization done today as well as echocardiogram. Patient denies SOB, orthopnea, cough, palpitations, fever, malaise, chills, syncope, dizziness, light-headedness, anxiety, abdominal pain, constipation, diarrhea, nausea, vomiting, and diaphoresis.          Procedure(s) (LRB):  CORONARY ARTERY BYPASS GRAFT (CABG) (N/A)  ECHOCARDIOGRAM,TRANSESOPHAGEAL (N/A)  SURGICAL PROCUREMENT, VEIN, ENDOSCOPIC (Bilateral)      Indwelling Lines/Drains at time of discharge:   Lines/Drains/Airways     Line                 Pacer Wires 05/31/19 1530 4 days              Hospital Course: 06/01/2019  The patient is post-operative day 1, status-post coronary artery bypass grafting x 5.     06/02/2019  The patient is post-operative day 2, status-post coronary artery bypass grafting x 5.     06/03/2019  The patient is " post-operative day 3, status-post coronary artery bypass grafting x 5.    06/04/2019  The patient is post-operative day 4, status-post coronary artery bypass grafting x 5.    06/05/2019  The patient is post-operative day 5, status-post coronary artery bypass grafting x 5.    Consults (From admission, onward)        Status Ordering Provider     Consult Case Management/Social Work  Once     Provider:  (Not yet assigned)    Completed BROOK DAVE     Consult to Case Management/Social Work  Once     Provider:  (Not yet assigned)    Completed BROOK DAVE     Inpatient consult to Cardiac Rehab  Once     Provider:  (Not yet assigned)    Acknowledged BROOK DAVE     Inpatient consult to Cardiology  Once     Provider:  (Not yet assigned)    Completed YANY GUAMAN     Inpatient consult to Pulmonology  Once     Provider:  Dann Mcclure MD    Completed TANG VAZQUEZ     Inpatient consult to Registered Dietitian/Nutritionist  Once     Provider:  (Not yet assigned)    Completed BROOK DAVE          Significant Diagnostic Studies: Labs:   BMP:   Recent Labs   Lab 06/04/19  0534 06/05/19  0508    99   * 130*   K 3.6 3.6   CL 92* 94*   CO2 31* 30*   BUN 23 18   CREATININE 0.9 0.8   CALCIUM 8.8 8.3*   MG 1.7 2.1    and CBC   Recent Labs   Lab 06/03/19  1044 06/04/19  0534 06/05/19  0508   WBC 17.47* 12.47 10.64   HGB 9.0* 9.0* 8.5*   HCT 26.4* 25.5* 24.3*   * 159 184       Pending Diagnostic Studies:     Procedure Component Value Units Date/Time    IR Heart Cath Images [[id number]]     Order Status:  Sent Lab Status:  No result     Magnesium [[id number]] Collected:  06/02/19 1037    Order Status:  Sent Lab Status:  In process Updated:  06/02/19 1037    Specimen:  Blood     Magnesium [[id number]] Collected:  05/31/19 2050    Order Status:  Sent Lab Status:  No result     Specimen:  Blood           S/P CABG x 5       06/01/2019  The patient is post-operative day 1, status-post  coronary artery bypass grafting x 5.  Overall the patient is doing well.    Neuro:  The patient is awake alert and oriented x3.  Neuro exam is nonfocal.  Pain is well controlled. D/C fentanyl.   Cardiac:  Patient is off all gtts.  Excellent cardiac index.  Patient will be started on metoprolol.   Respiratory: Good sats on nasal cannula.  Continue respiratory toilet, continue incentive spirometer.  GI:  Advance diet as tolerated. Cardiac diet.    Renal:  Good urine output. Cr 1.1. K 3.8. K replaced. Mag 2.6.  Heme:  Hct 20.  Plt 124. Administer 2 units PRBC. Continue Aspirin and Plavix.   Id:  White count is 14. Tmax 100.8  Suspect secondary to stress of surgery. Will continue to follow.  Endocrine:  Glucose is controlled with insulin gtt.  Discontinue insulin gtt. Start sliding scale insulin.   Activities:  Patient is out of bed to chair.  Advance activities as tolerated.  Lines tubes and drains:  Discontinue [place] and [place].  Discontinue A-line. Discontinue cordero catheter. Discontinue AV x2. Continue chest tubes x 4 for now.  Continue pacer wires. Start midline.          06/02/2019  The patient is post-operative day 2, status-post coronary artery bypass grafting x 5.  Overall the patient is doing well. Anticipate transfer to telemetry today.    Neuro:  The patient is awake alert and oriented x3.  Neuro exam is nonfocal.  Pain is well controlled. D/C oxycodone. Start tramadol.   Cardiac: Hemodynamically stable. Continue metoprolol at current dose.   Respiratory: Good sats on nasal cannula.  Continue respiratory toilet, continue incentive spirometer. Wean to room air.   GI:  Advance diet as tolerated. Cardiac diet.    Renal:  Good urine output. Cr 1.0. K 5.0. Hold K today. Mag 2.4. Once dose of Metolazone. Repeat BMP at 1300.    Heme:  Hct 27.8.  Plt 114. Continue Aspirin and Plavix.   Id:  White count is 22. Tmax 100.4  Suspect secondary to stress of surgery. Will continue to follow.  Endocrine:  Glucose is well  controlled with sliding scale insulin.   Activities:  Patient is out of bed to chair.  Advance activities as tolerated.  Lines tubes and drains: Discontinue chest tubes x4. Discontinue cordero catheter. Continue pacer wires. Continue midline.  Plan: Anticipate transfer to telemetry today.          06/03/2019  The patient is post-operative day 3, status-post coronary artery bypass grafting x 5.  Overall the patient is doing well. Anticipate discharge to home health in 24-48 hours.     Neuro:  The patient is awake alert and oriented x3.  Neuro exam is nonfocal.  Pain is well controlled.   Cardiac: Hemodynamically stable. Continue metoprolol at current dose.   Respiratory: Good sats on nasal cannula.  Continue respiratory toilet, continue incentive spirometer. Wean to room air.   GI:  Advance diet as tolerated. Cardiac diet. Regular BM x2 yesterday.   Renal:  Good urine output. Cr 0.9. K 3.9. PO Lasix BID, PO K BID.   Heme:  CBC pending. Continue Aspirin and Plavix.   Id:  CBC pending. Tmax 99.8. ID on board r/t increased WBC. On linezolid and aztreonam. Will continue to follow.  Endocrine:  Glucose is well controlled with sliding scale insulin.   Activities:  Patient is out of bed to chair.  Advance activities as tolerated. Increase PT. Increase ambulation.   Lines tubes and drains: Continue pacer wires. Continue midline.  Plan: Anticipate discharge to home health in 24-48 hours.          06/04/2019  The patient is post-operative day 4, status-post coronary artery bypass grafting x 5.  Overall the patient is doing well. Anticipate discharge to home health in 24 hours.     Neuro:  The patient is awake alert and oriented x3.  Neuro exam is nonfocal.  Pain is well controlled.   Cardiac: Systolic BP consistently less than 100, asymptomatic. LR infusion. Decrease metoprolol dose.   Respiratory: Good sats on nasal cannula.  Continue respiratory toilet, continue incentive spirometer. Change to room air.   GI:  Advance diet as  tolerated. Cardiac diet. Regular BM x2 yesterday.   Renal:  Good urine output. Cr 0.9. K 3.6. Mag 1.7. Replace K. Replace Mag. Stop diuresis.   Heme:  Hct 25.5. Plt 159. Continue Aspirin and Plavix.   Id:  WBC down to 12.4. Tmax 100. ID on board. On linezolid and aztreonam. Will continue to follow.  Endocrine:  Glucose is well controlled with sliding scale insulin.   Activities:  Patient is out of bed to chair.  Advance activities as tolerated. Increase PT. Increase ambulation.   Lines tubes and drains: Discontinue pacer wires. Continue midline.  Plan: Anticipate discharge to home health in 24 hours.         06/05/2019  The patient is post-operative day 5, status-post coronary artery bypass grafting x 5.  Overall the patient is doing well. Anticipate discharge to home health today.    Neuro:  The patient is awake alert and oriented x3.  Neuro exam is nonfocal.  Pain is well controlled.   Cardiac: Hemodynamically stable. Continue metoprolol dose.   Respiratory: Good sats on room air.  Continue incentive spirometer.  GI: Tolerating cardiac diet. Regular BMs.   Renal:  Good urine output. Cr 0.8. K 3.6. Mag 2.1. Replace K.  Heme:  Hct 24.3. Plt 184. Continue Aspirin and Plavix, folic acid, vit b12, and iron.    Id:  WBC down to 10.6 Tmax 97.5. Pt will be discharged on linezolid and augmentin x 4 days.   Endocrine:  Glucose is well controlled.   Activities:  Patient is out of bed to chair.  Advance activities as tolerated. Excellent progress with PT. Excellent ambulation.   Lines tubes and drains: Discontinue midline prior to discharge.   Plan: Anticipate discharge to home health today.          Final Active Diagnoses:    Diagnosis Date Noted POA    CAD, multiple vessel [I25.10] 06/01/2019 Yes    Anemia following surgery [D64.9] 06/01/2019 Yes    S/P CABG x 5 [Z95.1] 05/31/2019 Not Applicable    Leukocytosis [D72.829] 05/29/2019 Yes    Coronary artery disease involving native coronary artery of native heart with  angina pectoris [I25.119] 05/29/2019 Yes      Problems Resolved During this Admission:    Diagnosis Date Noted Date Resolved POA    PRINCIPAL PROBLEM:  NSTEMI (non-ST elevated myocardial infarction) [I21.4] 05/29/2019 06/02/2019 Yes    On mechanically assisted ventilation [Z99.11] 05/31/2019 06/01/2019 Not Applicable      Discharged Condition: good    Disposition: Home-Health Care Svc    Follow Up:    Patient Instructions:      Referral to Home health   Referral Priority: Routine Referral Type: Home Health Care   Referral Reason: Specialty Services Required   Requested Specialty: Home Health Services   Number of Visits Requested: 1     Diet Cardiac     Lifting restrictions   Order Comments: No lifting more than 10 lbs x 8 weeks.     No driving until:   Order Comments: No driving x 8 weeks.     Notify your health care provider if you experience any of the following:  temperature >100.4     Notify your health care provider if you experience any of the following:  persistent nausea and vomiting or diarrhea     Notify your health care provider if you experience any of the following:  severe uncontrolled pain     Notify your health care provider if you experience any of the following:  redness, tenderness, or signs of infection (pain, swelling, redness, odor or green/yellow discharge around incision site)     Notify your health care provider if you experience any of the following:  difficulty breathing or increased cough     Notify your health care provider if you experience any of the following:  severe persistent headache     Notify your health care provider if you experience any of the following:  worsening rash     Notify your health care provider if you experience any of the following:  persistent dizziness, light-headedness, or visual disturbances     Notify your health care provider if you experience any of the following:  increased confusion or weakness     Notify your health care provider if you experience any  of the following:     No dressing needed     Activity as tolerated   Order Comments: Sternal precautions x 8 weeks.     Medications:  Reconciled Home Medications:      Medication List      START taking these medications    amoxicillin-clavulanate 875-125mg 875-125 mg per tablet  Commonly known as:  AUGMENTIN  Take 1 tablet by mouth 2 (two) times daily. for 4 days     aspirin 81 MG EC tablet  Commonly known as:  ECOTRIN  Take 1 tablet (81 mg total) by mouth once daily.  Start taking on:  6/6/2019     atorvastatin 80 MG tablet  Commonly known as:  LIPITOR  Take 1 tablet (80 mg total) by mouth once daily.  Start taking on:  6/6/2019     clopidogrel 75 mg tablet  Commonly known as:  PLAVIX  Take 1 tablet (75 mg total) by mouth once daily.  Start taking on:  6/6/2019     cyanocobalamin 1000 MCG tablet  Commonly known as:  VITAMIN B-12  Take 1 tablet (1,000 mcg total) by mouth once daily.  Start taking on:  6/6/2019     ferrous sulfate 325 (65 FE) MG EC tablet  Take 1 tablet (325 mg total) by mouth 2 (two) times daily.     folic acid 1 MG tablet  Commonly known as:  FOLVITE  Take 1 tablet (1 mg total) by mouth once daily.  Start taking on:  6/6/2019     linezolid 600 mg Tab  Commonly known as:  ZYVOX  Take 1 tablet (600 mg total) by mouth every 12 (twelve) hours. for 4 days     metoprolol tartrate 25 MG tablet  Commonly known as:  LOPRESSOR  Take 0.5 tablets (12.5 mg total) by mouth 2 (two) times daily.     pantoprazole 40 MG tablet  Commonly known as:  PROTONIX  Take 1 tablet (40 mg total) by mouth once daily.  Start taking on:  6/6/2019     polyethylene glycol 17 gram/dose powder  Commonly known as:  GLYCOLAX  Take 17 g by mouth once daily. for 7 days  Start taking on:  6/6/2019        STOP taking these medications    naproxen 375 MG Tbec EC tablet  Commonly known as:  EC-NAPROSYN          Time spent on the discharge of patient: 30 minutes    Duane Carpenter NP  Cardiothoracic Surgery  [facility]

## 2019-06-07 PROCEDURE — G0180 MD CERTIFICATION HHA PATIENT: HCPCS | Mod: ,,, | Performed by: THORACIC SURGERY (CARDIOTHORACIC VASCULAR SURGERY)

## 2019-06-07 PROCEDURE — G0180 PR HOME HEALTH MD CERTIFICATION: ICD-10-PCS | Mod: ,,, | Performed by: THORACIC SURGERY (CARDIOTHORACIC VASCULAR SURGERY)

## 2019-06-14 LAB
BRPFT: NORMAL
FEF 25 75 LLN: 0.82
FEF 25 75 PRE REF: 162.7 %
FEF 25 75 REF: 2.03
FEV1 FVC LLN: 61
FEV1 FVC PRE REF: 104.9 %
FEV1 FVC REF: 76
FEV1 LLN: 1.93
FEV1 PRE REF: 125.9 %
FEV1 REF: 2.73
FVC LLN: 2.65
FVC PRE REF: 119.4 %
FVC REF: 3.63
PEF LLN: 5.05
PEF PRE REF: 111.6 %
PEF REF: 7.17
PRE FEF 25 75: 3.3 L/S (ref 0.82–3.79)
PRE FET 100: 7.15 SEC
PRE FEV1 FVC: 79.42 % (ref 61.39–88.51)
PRE FEV1: 3.44 L (ref 1.93–3.48)
PRE FVC: 4.33 L (ref 2.65–4.62)
PRE PEF: 8 L/S (ref 5.05–9.29)

## 2019-06-20 ENCOUNTER — EXTERNAL HOME HEALTH (OUTPATIENT)
Dept: HOME HEALTH SERVICES | Facility: HOSPITAL | Age: 74
End: 2019-06-20
Payer: MEDICARE

## 2019-06-27 ENCOUNTER — OFFICE VISIT (OUTPATIENT)
Dept: CARDIOLOGY | Facility: CLINIC | Age: 74
End: 2019-06-27
Payer: MEDICARE

## 2019-06-27 VITALS
DIASTOLIC BLOOD PRESSURE: 60 MMHG | HEART RATE: 76 BPM | WEIGHT: 171.06 LBS | BODY MASS INDEX: 26.85 KG/M2 | SYSTOLIC BLOOD PRESSURE: 102 MMHG | HEIGHT: 67 IN

## 2019-06-27 DIAGNOSIS — I25.119 CORONARY ARTERY DISEASE INVOLVING NATIVE CORONARY ARTERY OF NATIVE HEART WITH ANGINA PECTORIS: ICD-10-CM

## 2019-06-27 DIAGNOSIS — Z95.1 S/P CABG X 5: ICD-10-CM

## 2019-06-27 DIAGNOSIS — I25.10 CAD, MULTIPLE VESSEL: Primary | ICD-10-CM

## 2019-06-27 DIAGNOSIS — D64.9 ANEMIA FOLLOWING SURGERY: ICD-10-CM

## 2019-06-27 PROCEDURE — 99999 PR PBB SHADOW E&M-EST. PATIENT-LVL III: ICD-10-PCS | Mod: PBBFAC,,, | Performed by: INTERNAL MEDICINE

## 2019-06-27 PROCEDURE — 99214 OFFICE O/P EST MOD 30 MIN: CPT | Mod: S$PBB,,, | Performed by: INTERNAL MEDICINE

## 2019-06-27 PROCEDURE — 99999 PR PBB SHADOW E&M-EST. PATIENT-LVL III: CPT | Mod: PBBFAC,,, | Performed by: INTERNAL MEDICINE

## 2019-06-27 PROCEDURE — 99213 OFFICE O/P EST LOW 20 MIN: CPT | Mod: PBBFAC | Performed by: INTERNAL MEDICINE

## 2019-06-27 PROCEDURE — 99214 PR OFFICE/OUTPT VISIT, EST, LEVL IV, 30-39 MIN: ICD-10-PCS | Mod: S$PBB,,, | Performed by: INTERNAL MEDICINE

## 2019-06-27 RX ORDER — ALFUZOSIN HYDROCHLORIDE 10 MG/1
TABLET, EXTENDED RELEASE ORAL
Refills: 3 | COMMUNITY
Start: 2019-06-21

## 2019-06-27 NOTE — PROGRESS NOTES
Subjective:   Patient ID:  Chapin Calderon is a 73 y.o. male who presents for follow up of Hospital Follow Up      72 YO male came in for post cabg f/u  PMH CAD s/p CABGx5 on 05/31/2019, normal EF and HTN   admitted for NSTEMI, urgent LHC showed multivessel severe CAD and had CABGx5, LIMA to LAD and reverse saphenous vein graft to diagonal 1, OM 1 and a sequence reverse saphenous vein graft to the PDA and the FARHAN. by Dr. Dorado, post op course was uncomplicated.  Today, states that surgical wounds on chest and leg healing well  No Chest pain, dyspnea, dizziness, palpitation.   Walks a mile a day.  No smoking/drinking      Past Medical History:   Diagnosis Date    CAD, multiple vessel 6/1/2019    Prostate cancer        Past Surgical History:   Procedure Laterality Date    APPENDECTOMY      CATHETERIZATION, HEART, LEFT Left 5/29/2019    Performed by Reid Chase [facility] at [facility] CATH [facility]    CORONARY ARTERY BYPASS GRAFT (CABG) N/A 5/31/2019    [facility]formed by Mani Dorado [facility] at [facility] OR    ECHOCARDIOGRAM,TRANSESOPHAGEAL N/A 5/31/2019    [facility] at [facility] OR    SURGICAL PROCUREMENT, VEIN, ENDOSCOPIC Bilateral 5/31/2019    Performed by [facility]i [facility]ado [facility] at [facility] OR       Social History     Tobacco Use    Smoking status: Former Smoker    Smokeless tobacco: Never Used   Substance Use Topics    Alcohol use: No    Drug use: No       History reviewed. No pertinent family history.      Review of Systems   Constitution: Negative for decreased appetite, diaphoresis, fever, malaise/fatigue and night sweats.   HENT: Negative for nosebleeds.    Eyes: Negative for blurred vision and double vision.   Cardiovascular: Positive for leg swelling. Negative for chest pain, claudication, dyspnea on exertion, irregular heartbeat, near-syncope, orthopnea, palpitations, paroxysmal nocturnal dyspnea and syncope.   Respiratory: Negative for cough, shortness of breath, sleep disturbances due to breathing,  snoring, sputum production and wheezing.    Endocrine: Negative for cold intolerance and polyuria.   Hematologic/Lymphatic: Does not bruise/bleed easily.   Skin: Positive for color change (both legs). Negative for rash.   Musculoskeletal: Negative for back pain, falls, joint pain, joint swelling and neck pain.   Gastrointestinal: Negative for abdominal pain, heartburn, nausea and vomiting.   Genitourinary: Negative for dysuria, frequency and hematuria.   Neurological: Negative for difficulty with concentration, dizziness, focal weakness, headaches, light-headedness, numbness, seizures and weakness.   Psychiatric/Behavioral: Negative for depression, memory loss and substance abuse. The patient does not have insomnia.    Allergic/Immunologic: Negative for HIV exposure and hives.       Objective:   Physical Exam   Constitutional: He is oriented to person, place, and time. He appears well-nourished.   HENT:   Head: Normocephalic.   Eyes: Pupils are equal, round, and reactive to light.   Neck: Normal carotid pulses and no JVD present. Carotid bruit is not present. No thyromegaly present.   Cardiovascular: Normal rate, regular rhythm, normal heart sounds and normal pulses.  No extrasystoles are present. PMI is not displaced. Exam reveals no gallop and no S3.   No murmur heard.  Pulmonary/Chest: Breath sounds normal. No stridor. No respiratory distress.   Abdominal: Soft. Bowel sounds are normal. There is no tenderness. There is no rebound.   Musculoskeletal: Normal range of motion.   Neurological: He is alert and oriented to person, place, and time.   Skin: Skin is intact. No rash noted.   Leg bruises   Psychiatric: His behavior is normal.       Lab Results   Component Value Date    CHOL 194 05/29/2019     Lab Results   Component Value Date    HDL 42 05/29/2019     Lab Results   Component Value Date    LDLCALC 134.0 05/29/2019     Lab Results   Component Value Date    TRIG 90 05/29/2019     Lab Results   Component Value  Date    CHOLHDL 21.6 05/29/2019       Chemistry        Component Value Date/Time     (L) 06/05/2019 0508    K 3.6 06/05/2019 0508    CL 94 (L) 06/05/2019 0508    CO2 30 (H) 06/05/2019 0508    BUN 18 06/05/2019 0508    CREATININE 0.8 06/05/2019 0508    GLU 99 06/05/2019 0508        Component Value Date/Time    CALCIUM 8.3 (L) 06/05/2019 0508    ALKPHOS 75 05/31/2019 0543    AST 27 05/31/2019 0543    ALT 14 05/31/2019 0543    BILITOT 0.7 05/31/2019 0543    ESTGFRAFRICA >60 06/05/2019 0508    EGFRNONAA >60 06/05/2019 0508          Lab Results   Component Value Date    HGBA1C 5.3 05/30/2019     No results found for: TSH  Lab Results   Component Value Date    INR 1.1 06/01/2019    INR 1.2 05/31/2019    INR 1.6 (H) 05/31/2019     Lab Results   Component Value Date    WBC 10.64 06/05/2019    HGB 8.5 (L) 06/05/2019    HCT 24.3 (L) 06/05/2019    MCV 86 06/05/2019     06/05/2019     BMP  Sodium   Date Value Ref Range Status   06/05/2019 130 (L) 136 - 145 mmol/L Final     Potassium   Date Value Ref Range Status   06/05/2019 3.6 3.5 - 5.1 mmol/L Final     Chloride   Date Value Ref Range Status   06/05/2019 94 (L) 95 - 110 mmol/L Final     CO2   Date Value Ref Range Status   06/05/2019 30 (H) 23 - 29 mmol/L Final     BUN, Bld   Date Value Ref Range Status   06/05/2019 18 8 - 23 mg/dL Final     Creatinine   Date Value Ref Range Status   06/05/2019 0.8 0.5 - 1.4 mg/dL Final     Calcium   Date Value Ref Range Status   06/05/2019 8.3 (L) 8.7 - 10.5 mg/dL Final     Anion Gap   Date Value Ref Range Status   06/05/2019 6 (L) 8 - 16 mmol/L Final     eGFR if    Date Value Ref Range Status   06/05/2019 >60 >60 mL/min/1.73 m^2 Final     eGFR if non    Date Value Ref Range Status   06/05/2019 >60 >60 mL/min/1.73 m^2 Final     Comment:     Calculation used to obtain the estimated glomerular filtration  rate (eGFR) is the CKD-EPI equation.         BNP  @LABRCNTIP(BNP,BNPTRIAGEBLO)@  @LABRCNTIP(troponini)@  CrCl cannot be calculated (Patient's most recent lab result is older than the maximum 7 days allowed.).  No results found in the last 24 hours.  No results found in the last 24 hours.  No results found in the last 24 hours.    Assessment:      1. CAD, multiple vessel    2. Coronary artery disease involving native coronary artery of native heart with angina pectoris    3. S/P CABG x 5    4. Anemia following surgery      Cbc and cmp checked with pcp  Plan:   OK for parttime  work  Wait for yard work  Continue ASA 81mg, PlVIX, LIPTIOR, METOPROLOL  CHECK LIPID PROFILE AND CMP IN 4WEEKS  Recommend heart-healthy diet, weight control and regular exercise.  Pablo. Risk modification.   RTC in 4 months    I have reviewed all pertinent labs and cardiac studies. Plans and recommendations have been formulated under my direct supervision. All questions answered and patient voiced understanding. Patient to continue current medications.

## 2019-07-15 ENCOUNTER — LAB VISIT (OUTPATIENT)
Dept: LAB | Facility: HOSPITAL | Age: 74
End: 2019-07-15
Attending: INTERNAL MEDICINE
Payer: MEDICARE

## 2019-07-15 DIAGNOSIS — I25.10 CAD, MULTIPLE VESSEL: ICD-10-CM

## 2019-07-15 PROCEDURE — 80053 COMPREHEN METABOLIC PANEL: CPT

## 2019-07-15 PROCEDURE — 80061 LIPID PANEL: CPT

## 2019-07-15 PROCEDURE — 36415 COLL VENOUS BLD VENIPUNCTURE: CPT | Mod: PO

## 2019-07-16 LAB
ALBUMIN SERPL BCP-MCNC: 3.2 G/DL (ref 3.5–5.2)
ALP SERPL-CCNC: 93 U/L (ref 55–135)
ALT SERPL W/O P-5'-P-CCNC: 9 U/L (ref 10–44)
ANION GAP SERPL CALC-SCNC: 8 MMOL/L (ref 8–16)
AST SERPL-CCNC: 19 U/L (ref 10–40)
BILIRUB SERPL-MCNC: 0.4 MG/DL (ref 0.1–1)
BUN SERPL-MCNC: 8 MG/DL (ref 8–23)
CALCIUM SERPL-MCNC: 9.3 MG/DL (ref 8.7–10.5)
CHLORIDE SERPL-SCNC: 101 MMOL/L (ref 95–110)
CHOLEST SERPL-MCNC: 108 MG/DL (ref 120–199)
CHOLEST/HDLC SERPL: 3.4 {RATIO} (ref 2–5)
CO2 SERPL-SCNC: 26 MMOL/L (ref 23–29)
CREAT SERPL-MCNC: 0.9 MG/DL (ref 0.5–1.4)
EST. GFR  (AFRICAN AMERICAN): >60 ML/MIN/1.73 M^2
EST. GFR  (NON AFRICAN AMERICAN): >60 ML/MIN/1.73 M^2
GLUCOSE SERPL-MCNC: 88 MG/DL (ref 70–110)
HDLC SERPL-MCNC: 32 MG/DL (ref 40–75)
HDLC SERPL: 29.6 % (ref 20–50)
LDLC SERPL CALC-MCNC: 62 MG/DL (ref 63–159)
NONHDLC SERPL-MCNC: 76 MG/DL
POTASSIUM SERPL-SCNC: 4.1 MMOL/L (ref 3.5–5.1)
PROT SERPL-MCNC: 5.9 G/DL (ref 6–8.4)
SODIUM SERPL-SCNC: 135 MMOL/L (ref 136–145)
TRIGL SERPL-MCNC: 70 MG/DL (ref 30–150)

## 2019-07-17 ENCOUNTER — TELEPHONE (OUTPATIENT)
Dept: CARDIOLOGY | Facility: CLINIC | Age: 74
End: 2019-07-17

## 2019-07-17 NOTE — TELEPHONE ENCOUNTER
Spoke with pt with lab resutls  Pt would like to know if he is released in 12 weeks or 8 weeks and if he needs a release form.  Will ask dr Walters to advise.

## 2019-09-16 RX ORDER — ATORVASTATIN CALCIUM 80 MG/1
80 TABLET, FILM COATED ORAL DAILY
Qty: 90 TABLET | Refills: 3 | Status: SHIPPED | OUTPATIENT
Start: 2019-09-16 | End: 2019-09-23 | Stop reason: SINTOL

## 2019-09-23 ENCOUNTER — TELEPHONE (OUTPATIENT)
Dept: CARDIOLOGY | Facility: CLINIC | Age: 74
End: 2019-09-23

## 2019-09-23 DIAGNOSIS — I25.119 CORONARY ARTERY DISEASE INVOLVING NATIVE CORONARY ARTERY OF NATIVE HEART WITH ANGINA PECTORIS: Primary | ICD-10-CM

## 2019-09-23 RX ORDER — PRAVASTATIN SODIUM 40 MG/1
80 TABLET ORAL DAILY
Qty: 30 TABLET | Refills: 11 | Status: SHIPPED | OUTPATIENT
Start: 2019-09-23 | End: 2020-04-15 | Stop reason: SDUPTHER

## 2019-09-23 NOTE — TELEPHONE ENCOUNTER
----- Message from Stacy Moreno sent at 9/23/2019 10:41 AM CDT -----  Patient is asking for a new prescription on the Lipitor 80 mg. He states that the medication is giving him GI problems, says he can no longer take this strength. It it possible for a change?

## 2019-10-30 ENCOUNTER — OFFICE VISIT (OUTPATIENT)
Dept: CARDIOLOGY | Facility: CLINIC | Age: 74
End: 2019-10-30
Payer: MEDICARE

## 2019-10-30 VITALS
HEART RATE: 62 BPM | SYSTOLIC BLOOD PRESSURE: 119 MMHG | WEIGHT: 165.81 LBS | OXYGEN SATURATION: 96 % | BODY MASS INDEX: 26.02 KG/M2 | DIASTOLIC BLOOD PRESSURE: 70 MMHG | HEIGHT: 67 IN

## 2019-10-30 DIAGNOSIS — I25.10 CAD, MULTIPLE VESSEL: Primary | ICD-10-CM

## 2019-10-30 DIAGNOSIS — I10 ESSENTIAL HYPERTENSION: ICD-10-CM

## 2019-10-30 DIAGNOSIS — Z95.1 S/P CABG X 5: ICD-10-CM

## 2019-10-30 DIAGNOSIS — E78.2 MIXED HYPERLIPIDEMIA: ICD-10-CM

## 2019-10-30 PROCEDURE — 99999 PR PBB SHADOW E&M-EST. PATIENT-LVL III: ICD-10-PCS | Mod: PBBFAC,,, | Performed by: INTERNAL MEDICINE

## 2019-10-30 PROCEDURE — 99213 OFFICE O/P EST LOW 20 MIN: CPT | Mod: PBBFAC,PO | Performed by: INTERNAL MEDICINE

## 2019-10-30 PROCEDURE — 99999 PR PBB SHADOW E&M-EST. PATIENT-LVL III: CPT | Mod: PBBFAC,,, | Performed by: INTERNAL MEDICINE

## 2019-10-30 PROCEDURE — 99214 OFFICE O/P EST MOD 30 MIN: CPT | Mod: S$PBB,,, | Performed by: INTERNAL MEDICINE

## 2019-10-30 PROCEDURE — 99214 PR OFFICE/OUTPT VISIT, EST, LEVL IV, 30-39 MIN: ICD-10-PCS | Mod: S$PBB,,, | Performed by: INTERNAL MEDICINE

## 2019-10-30 NOTE — PROGRESS NOTES
Subjective:   Patient ID:  Chapin Calderon is a 74 y.o. male who presents for follow up of CAD F/U      74 YO male came in for post cabg f/u  PMH CAD s/p CABGx5 on 05/31/2019, normal EF and HTN   admitted for NSTEMI, urgent LHC showed multivessel severe CAD and had CABGx5, LIMA to LAD and reverse saphenous vein graft to diagonal 1, OM 1 and a sequence reverse saphenous vein graft to the PDA and the FARHAN. by Dr. Dorado, post op course was uncomplicated.  Today, states that surgical wounds on chest and leg healing well  No Chest pain, dyspnea, dizziness, palpitation.   Walks a mile a day.  No smoking/drinking  Med compliance  Plavix off by patient due to refractory diarrhea. Now ASA 81mg, statin and metoprolol  Started treadmill exercise      Past Medical History:   Diagnosis Date    CAD, multiple vessel 6/1/2019    Prostate cancer        Past Surgical History:   Procedure Laterality Date    APPENDECTOMY      CORONARY ARTERY BYPASS GRAFT (CABG) N/A 5/31/2019    Procedure: CORONARY ARTERY BYPASS GRAFT (CABG);  Surgeon: Mani Dorado MD;  Location: [facility] OR;  Service: Cardiothoracic;  Laterality: N/A;  5 VESSEL    ENDOSCOPIC HARVEST OF VEIN Bilateral 5/31/2019    Procedure: SURGICAL PROCUREMENT, VEIN, ENDOSCOPIC;  Surgeon: Mani Dorado MD;  Location: [facility] OR;  Service: Cardiothoracic;  Laterality: Bilateral;    LEFT HEART CATHETERIZATION Left 5/29/2019    Procedure: CATHETERIZATION, HEART, LEFT;  Surgeon: Reid Chase MD;  Location: [facility] CATH LAB;  Service: Cardiology;  Laterality: Left;       Social History     Tobacco Use    Smoking status: Former Smoker    Smokeless tobacco: Never Used   Substance Use Topics    Alcohol use: No    Drug use: No       History reviewed. No pertinent family history.      Review of Systems   Constitution: Negative for decreased appetite, diaphoresis, fever, malaise/fatigue and night sweats.   HENT: Negative for nosebleeds.    Eyes: Negative for blurred vision and  double vision.   Cardiovascular: Negative for chest pain, claudication, dyspnea on exertion, irregular heartbeat, near-syncope, orthopnea, palpitations, paroxysmal nocturnal dyspnea and syncope.   Respiratory: Negative for cough, shortness of breath, sleep disturbances due to breathing, snoring, sputum production and wheezing.    Endocrine: Negative for cold intolerance and polyuria.   Hematologic/Lymphatic: Does not bruise/bleed easily.   Skin: Negative for rash. Color change: both legs.   Musculoskeletal: Negative for back pain, falls, joint pain, joint swelling and neck pain.   Gastrointestinal: Negative for abdominal pain, heartburn, nausea and vomiting.   Genitourinary: Negative for dysuria, frequency and hematuria.   Neurological: Negative for difficulty with concentration, dizziness, focal weakness, headaches, light-headedness, numbness, seizures and weakness.   Psychiatric/Behavioral: Negative for depression, memory loss and substance abuse. The patient does not have insomnia.    Allergic/Immunologic: Negative for HIV exposure and hives.       Objective:   Physical Exam   Constitutional: He is oriented to person, place, and time. He appears well-nourished.   HENT:   Head: Normocephalic.   Eyes: Pupils are equal, round, and reactive to light.   Neck: Normal carotid pulses and no JVD present. Carotid bruit is not present. No thyromegaly present.   Cardiovascular: Normal rate, regular rhythm, normal heart sounds and normal pulses.  No extrasystoles are present. PMI is not displaced. Exam reveals no gallop and no S3.   No murmur heard.  Pulmonary/Chest: Breath sounds normal. No stridor. No respiratory distress.   Surgical wound healing well on sternus   Abdominal: Soft. Bowel sounds are normal. There is no tenderness. There is no rebound.   Musculoskeletal: Normal range of motion.   Neurological: He is alert and oriented to person, place, and time.   Skin: Skin is intact. No rash noted.   Leg bruises    Psychiatric: His behavior is normal.       Lab Results   Component Value Date    CHOL 108 (L) 07/15/2019    CHOL 194 05/29/2019     Lab Results   Component Value Date    HDL 32 (L) 07/15/2019    HDL 42 05/29/2019     Lab Results   Component Value Date    LDLCALC 62.0 (L) 07/15/2019    LDLCALC 134.0 05/29/2019     Lab Results   Component Value Date    TRIG 70 07/15/2019    TRIG 90 05/29/2019     Lab Results   Component Value Date    CHOLHDL 29.6 07/15/2019    CHOLHDL 21.6 05/29/2019       Chemistry        Component Value Date/Time     (L) 07/15/2019 1040    K 4.1 07/15/2019 1040     07/15/2019 1040    CO2 26 07/15/2019 1040    BUN 8 07/15/2019 1040    CREATININE 0.9 07/15/2019 1040    GLU 88 07/15/2019 1040        Component Value Date/Time    CALCIUM 9.3 07/15/2019 1040    ALKPHOS 93 07/15/2019 1040    AST 19 07/15/2019 1040    ALT 9 (L) 07/15/2019 1040    BILITOT 0.4 07/15/2019 1040    ESTGFRAFRICA >60.0 07/15/2019 1040    EGFRNONAA >60.0 07/15/2019 1040          Lab Results   Component Value Date    HGBA1C 5.3 05/30/2019     No results found for: TSH  Lab Results   Component Value Date    INR 1.1 06/01/2019    INR 1.2 05/31/2019    INR 1.6 (H) 05/31/2019     Lab Results   Component Value Date    WBC 10.64 06/05/2019    HGB 8.5 (L) 06/05/2019    HCT 24.3 (L) 06/05/2019    MCV 86 06/05/2019     06/05/2019     BMP  Sodium   Date Value Ref Range Status   07/15/2019 135 (L) 136 - 145 mmol/L Final     Potassium   Date Value Ref Range Status   07/15/2019 4.1 3.5 - 5.1 mmol/L Final     Chloride   Date Value Ref Range Status   07/15/2019 101 95 - 110 mmol/L Final     CO2   Date Value Ref Range Status   07/15/2019 26 23 - 29 mmol/L Final     BUN, Bld   Date Value Ref Range Status   07/15/2019 8 8 - 23 mg/dL Final     Creatinine   Date Value Ref Range Status   07/15/2019 0.9 0.5 - 1.4 mg/dL Final     Calcium   Date Value Ref Range Status   07/15/2019 9.3 8.7 - 10.5 mg/dL Final     Anion Gap   Date Value  Ref Range Status   07/15/2019 8 8 - 16 mmol/L Final     eGFR if    Date Value Ref Range Status   07/15/2019 >60.0 >60 mL/min/1.73 m^2 Final     eGFR if non    Date Value Ref Range Status   07/15/2019 >60.0 >60 mL/min/1.73 m^2 Final     Comment:     Calculation used to obtain the estimated glomerular filtration  rate (eGFR) is the CKD-EPI equation.        BNP  @LABRCNTIP(BNP,BNPTRIAGEBLO)@  @LABRCNTIP(troponini)@  CrCl cannot be calculated (Patient's most recent lab result is older than the maximum 7 days allowed.).  No results found in the last 24 hours.  No results found in the last 24 hours.  No results found in the last 24 hours.    Assessment:      1. CAD, multiple vessel    2. S/P CABG x 5    3. Essential hypertension    4. Mixed hyperlipidemia        Plan:   Continue ASA, statin and metoprolol  DASH  Recommend heart-healthy diet, weight control and regular exercise.  Pablo. Risk modification.   RTC in 6 months    I have reviewed all pertinent labs and cardiac studies. Plans and recommendations have been formulated under my direct supervision. All questions answered and patient voiced understanding. Patient to continue current medications.

## 2020-04-15 DIAGNOSIS — E78.2 MIXED HYPERLIPIDEMIA: Primary | ICD-10-CM

## 2020-04-15 RX ORDER — PRAVASTATIN SODIUM 40 MG/1
80 TABLET ORAL DAILY
Qty: 30 TABLET | Refills: 11 | Status: SHIPPED | OUTPATIENT
Start: 2020-04-15 | End: 2020-08-06 | Stop reason: SDUPTHER

## 2020-04-15 NOTE — TELEPHONE ENCOUNTER
----- Message from Lio Garcia sent at 4/15/2020  8:37 AM CDT -----  Contact: Pt  Pt request a call because he states his pharmacy told him that we will not refill his RX of Pravastatin and he needs a New RX..    [id number] - [place], [place] - [address] AT SEC OF [place] [address] & [address] [facility]  [address]  [place] [address]  Phone: [phone] Fax: [phone]    You can contact the pt at [phone]

## 2020-06-15 DIAGNOSIS — I25.10 CAD, MULTIPLE VESSEL: Primary | ICD-10-CM

## 2020-06-15 DIAGNOSIS — I20.9 AP (ANGINA PECTORIS): ICD-10-CM

## 2020-06-16 RX ORDER — METOPROLOL TARTRATE 25 MG/1
12.5 TABLET, FILM COATED ORAL 2 TIMES DAILY
Qty: 90 TABLET | Refills: 3 | Status: SHIPPED | OUTPATIENT
Start: 2020-06-16 | End: 2021-06-16

## 2020-06-16 RX ORDER — METOPROLOL TARTRATE 25 MG/1
12.5 TABLET, FILM COATED ORAL 2 TIMES DAILY
COMMUNITY
End: 2021-06-16

## 2020-07-15 ENCOUNTER — OFFICE VISIT (OUTPATIENT)
Dept: CARDIOLOGY | Facility: CLINIC | Age: 75
End: 2020-07-15
Payer: MEDICARE

## 2020-07-15 ENCOUNTER — CLINICAL SUPPORT (OUTPATIENT)
Dept: CARDIOLOGY | Facility: CLINIC | Age: 75
End: 2020-07-15
Payer: MEDICARE

## 2020-07-15 ENCOUNTER — LAB VISIT (OUTPATIENT)
Dept: LAB | Facility: HOSPITAL | Age: 75
End: 2020-07-15
Attending: INTERNAL MEDICINE
Payer: MEDICARE

## 2020-07-15 VITALS
WEIGHT: 165 LBS | HEART RATE: 65 BPM | SYSTOLIC BLOOD PRESSURE: 122 MMHG | OXYGEN SATURATION: 97 % | BODY MASS INDEX: 25.84 KG/M2 | DIASTOLIC BLOOD PRESSURE: 60 MMHG

## 2020-07-15 DIAGNOSIS — Z95.1 S/P CABG X 5: ICD-10-CM

## 2020-07-15 DIAGNOSIS — E78.2 MIXED HYPERLIPIDEMIA: ICD-10-CM

## 2020-07-15 DIAGNOSIS — I25.10 CAD, MULTIPLE VESSEL: ICD-10-CM

## 2020-07-15 DIAGNOSIS — I25.119 CORONARY ARTERY DISEASE INVOLVING NATIVE CORONARY ARTERY OF NATIVE HEART WITH ANGINA PECTORIS: ICD-10-CM

## 2020-07-15 DIAGNOSIS — I25.10 CAD, MULTIPLE VESSEL: Primary | ICD-10-CM

## 2020-07-15 DIAGNOSIS — I45.10 RBBB: ICD-10-CM

## 2020-07-15 DIAGNOSIS — I10 ESSENTIAL HYPERTENSION: ICD-10-CM

## 2020-07-15 LAB
ALBUMIN SERPL BCP-MCNC: 4.1 G/DL (ref 3.5–5.2)
ALP SERPL-CCNC: 85 U/L (ref 55–135)
ALT SERPL W/O P-5'-P-CCNC: 12 U/L (ref 10–44)
ANION GAP SERPL CALC-SCNC: 6 MMOL/L (ref 8–16)
AST SERPL-CCNC: 23 U/L (ref 10–40)
BASOPHILS # BLD AUTO: 0.09 K/UL (ref 0–0.2)
BASOPHILS NFR BLD: 0.9 % (ref 0–1.9)
BILIRUB SERPL-MCNC: 0.6 MG/DL (ref 0.1–1)
BUN SERPL-MCNC: 13 MG/DL (ref 8–23)
CALCIUM SERPL-MCNC: 9.9 MG/DL (ref 8.7–10.5)
CHLORIDE SERPL-SCNC: 101 MMOL/L (ref 95–110)
CHOLEST SERPL-MCNC: 150 MG/DL (ref 120–199)
CHOLEST/HDLC SERPL: 3.7 {RATIO} (ref 2–5)
CO2 SERPL-SCNC: 30 MMOL/L (ref 23–29)
CREAT SERPL-MCNC: 1 MG/DL (ref 0.5–1.4)
DIFFERENTIAL METHOD: ABNORMAL
EOSINOPHIL # BLD AUTO: 0.4 K/UL (ref 0–0.5)
EOSINOPHIL NFR BLD: 4 % (ref 0–8)
ERYTHROCYTE [DISTWIDTH] IN BLOOD BY AUTOMATED COUNT: 12.4 % (ref 11.5–14.5)
EST. GFR  (AFRICAN AMERICAN): >60 ML/MIN/1.73 M^2
EST. GFR  (NON AFRICAN AMERICAN): >60 ML/MIN/1.73 M^2
GLUCOSE SERPL-MCNC: 87 MG/DL (ref 70–110)
HCT VFR BLD AUTO: 44.9 % (ref 40–54)
HDLC SERPL-MCNC: 41 MG/DL (ref 40–75)
HDLC SERPL: 27.3 % (ref 20–50)
HGB BLD-MCNC: 15.4 G/DL (ref 14–18)
IMM GRANULOCYTES # BLD AUTO: 0.05 K/UL (ref 0–0.04)
IMM GRANULOCYTES NFR BLD AUTO: 0.5 % (ref 0–0.5)
LDLC SERPL CALC-MCNC: 86.6 MG/DL (ref 63–159)
LYMPHOCYTES # BLD AUTO: 2 K/UL (ref 1–4.8)
LYMPHOCYTES NFR BLD: 21.3 % (ref 18–48)
MCH RBC QN AUTO: 30.6 PG (ref 27–31)
MCHC RBC AUTO-ENTMCNC: 34.3 G/DL (ref 32–36)
MCV RBC AUTO: 89 FL (ref 82–98)
MONOCYTES # BLD AUTO: 1.2 K/UL (ref 0.3–1)
MONOCYTES NFR BLD: 12.6 % (ref 4–15)
NEUTROPHILS # BLD AUTO: 5.8 K/UL (ref 1.8–7.7)
NEUTROPHILS NFR BLD: 60.7 % (ref 38–73)
NONHDLC SERPL-MCNC: 109 MG/DL
NRBC BLD-RTO: 0 /100 WBC
PLATELET # BLD AUTO: 268 K/UL (ref 150–350)
PMV BLD AUTO: 10.5 FL (ref 9.2–12.9)
POTASSIUM SERPL-SCNC: 5 MMOL/L (ref 3.5–5.1)
PROT SERPL-MCNC: 7.3 G/DL (ref 6–8.4)
RBC # BLD AUTO: 5.04 M/UL (ref 4.6–6.2)
SODIUM SERPL-SCNC: 137 MMOL/L (ref 136–145)
TRIGL SERPL-MCNC: 112 MG/DL (ref 30–150)
WBC # BLD AUTO: 9.54 K/UL (ref 3.9–12.7)

## 2020-07-15 PROCEDURE — 80061 LIPID PANEL: CPT

## 2020-07-15 PROCEDURE — 99999 PR PBB SHADOW E&M-EST. PATIENT-LVL III: ICD-10-PCS | Mod: PBBFAC,,, | Performed by: INTERNAL MEDICINE

## 2020-07-15 PROCEDURE — 99213 OFFICE O/P EST LOW 20 MIN: CPT | Mod: PBBFAC,PO | Performed by: INTERNAL MEDICINE

## 2020-07-15 PROCEDURE — 80053 COMPREHEN METABOLIC PANEL: CPT

## 2020-07-15 PROCEDURE — 36415 COLL VENOUS BLD VENIPUNCTURE: CPT | Mod: PO

## 2020-07-15 PROCEDURE — 85025 COMPLETE CBC W/AUTO DIFF WBC: CPT

## 2020-07-15 PROCEDURE — 99214 OFFICE O/P EST MOD 30 MIN: CPT | Mod: S$PBB,,, | Performed by: INTERNAL MEDICINE

## 2020-07-15 PROCEDURE — 99999 PR PBB SHADOW E&M-EST. PATIENT-LVL III: CPT | Mod: PBBFAC,,, | Performed by: INTERNAL MEDICINE

## 2020-07-15 PROCEDURE — 93005 ELECTROCARDIOGRAM TRACING: CPT | Mod: PBBFAC,PO | Performed by: INTERNAL MEDICINE

## 2020-07-15 PROCEDURE — 99214 PR OFFICE/OUTPT VISIT, EST, LEVL IV, 30-39 MIN: ICD-10-PCS | Mod: S$PBB,,, | Performed by: INTERNAL MEDICINE

## 2020-07-15 PROCEDURE — 93010 EKG 12-LEAD: ICD-10-PCS | Mod: S$PBB,,, | Performed by: INTERNAL MEDICINE

## 2020-07-15 PROCEDURE — 93010 ELECTROCARDIOGRAM REPORT: CPT | Mod: S$PBB,,, | Performed by: INTERNAL MEDICINE

## 2020-07-15 NOTE — PROGRESS NOTES
Subjective:   Patient ID:  Chapin Calderon is a 75 y.o. male who presents for follow up of No chief complaint on file.      76 YO male came in for 8 months f/u  PMH CAD s/p CABGx5 on 05/31/2019, normal EF and HTN RBBB   admitted for NSTEMI, urgent LHC showed multivessel severe CAD and had CABGx5, LIMA to LAD and reverse saphenous vein graft to diagonal 1, OM 1 and a sequence reverse saphenous vein graft to the PDA and the FARHAN. by Dr. Dorado, post op course was uncomplicated.  Today, states that does yard work. Lives with his wife.   Surgical wounds on chest and leg healing well. Chronic numbness on left chest wall.   No Chest pain, dyspnea, dizziness, palpitation.   No smoking/drinking  Med compliance  Plavix off by patient due to refractory diarrhea. Now ASA 81mg, statin and metoprolol  Today EKG NSR and RBBB, new compared to prior one in         Past Medical History:   Diagnosis Date    CAD, multiple vessel 6/1/2019    Prostate cancer        Past Surgical History:   Procedure Laterality Date    APPENDECTOMY      CORONARY ARTERY BYPASS GRAFT (CABG) N/A 5/31/2019    Procedure: CORONARY ARTERY BYPASS GRAFT (CABG);  Surgeon: Mani Dorado MD;  Location: [facility] OR;  Service: Cardiothoracic;  Laterality: N/A;  5 VESSEL    ENDOSCOPIC HARVEST OF VEIN Bilateral 5/31/2019    Procedure: SURGICAL PROCUREMENT, VEIN, ENDOSCOPIC;  Surgeon: Mani Dorado MD;  Location: [facility] OR;  Service: Cardiothoracic;  Laterality: Bilateral;    LEFT HEART CATHETERIZATION Left 5/29/2019    Procedure: CATHETERIZATION, HEART, LEFT;  Surgeon: Reid Chase MD;  Location: [facility] CATH LAB;  Service: Cardiology;  Laterality: Left;       Social History     Tobacco Use    Smoking status: Former Smoker    Smokeless tobacco: Never Used   Substance Use Topics    Alcohol use: No    Drug use: No       No family history on file.      Review of Systems   Constitution: Negative for decreased appetite, diaphoresis, fever,  malaise/fatigue and night sweats.   HENT: Negative for nosebleeds.    Eyes: Negative for blurred vision and double vision.   Cardiovascular: Negative for chest pain, claudication, dyspnea on exertion, irregular heartbeat, near-syncope, orthopnea, palpitations, paroxysmal nocturnal dyspnea and syncope.   Respiratory: Negative for cough, shortness of breath, sleep disturbances due to breathing, snoring, sputum production and wheezing.    Endocrine: Negative for cold intolerance and polyuria.   Hematologic/Lymphatic: Does not bruise/bleed easily.   Skin: Negative for rash. Color change: both legs.   Musculoskeletal: Negative for back pain, falls, joint pain, joint swelling and neck pain.   Gastrointestinal: Negative for abdominal pain, heartburn, nausea and vomiting.   Genitourinary: Negative for dysuria, frequency and hematuria.   Neurological: Negative for difficulty with concentration, dizziness, focal weakness, headaches, light-headedness, numbness, seizures and weakness.   Psychiatric/Behavioral: Negative for depression, memory loss and substance abuse. The patient does not have insomnia.    Allergic/Immunologic: Negative for HIV exposure and hives.       Objective:   Physical Exam   Constitutional: He is oriented to person, place, and time. He appears well-nourished.   HENT:   Head: Normocephalic.   Eyes: Pupils are equal, round, and reactive to light.   Neck: Normal carotid pulses and no JVD present. Carotid bruit is not present. No thyromegaly present.   Cardiovascular: Normal rate, regular rhythm, normal heart sounds and normal pulses.  No extrasystoles are present. PMI is not displaced. Exam reveals no gallop and no S3.   No murmur heard.  Pulmonary/Chest: Breath sounds normal. No stridor. No respiratory distress.   Surgical wound healing well on sternus   Abdominal: Soft. Bowel sounds are normal. There is no abdominal tenderness. There is no rebound.   Musculoskeletal: Normal range of motion.   Neurological:  He is alert and oriented to person, place, and time.   Skin: Skin is intact. No rash noted.   Leg bruises   Psychiatric: His behavior is normal.       Lab Results   Component Value Date    CHOL 108 (L) 07/15/2019    CHOL 194 05/29/2019     Lab Results   Component Value Date    HDL 32 (L) 07/15/2019    HDL 42 05/29/2019     Lab Results   Component Value Date    LDLCALC 62.0 (L) 07/15/2019    LDLCALC 134.0 05/29/2019     Lab Results   Component Value Date    TRIG 70 07/15/2019    TRIG 90 05/29/2019     Lab Results   Component Value Date    CHOLHDL 29.6 07/15/2019    CHOLHDL 21.6 05/29/2019       Chemistry        Component Value Date/Time     (L) 07/15/2019 1040    K 4.1 07/15/2019 1040     07/15/2019 1040    CO2 26 07/15/2019 1040    BUN 8 07/15/2019 1040    CREATININE 0.9 07/15/2019 1040    GLU 88 07/15/2019 1040        Component Value Date/Time    CALCIUM 9.3 07/15/2019 1040    ALKPHOS 93 07/15/2019 1040    AST 19 07/15/2019 1040    ALT 9 (L) 07/15/2019 1040    BILITOT 0.4 07/15/2019 1040    ESTGFRAFRICA >60.0 07/15/2019 1040    EGFRNONAA >60.0 07/15/2019 1040          Lab Results   Component Value Date    HGBA1C 5.3 05/30/2019     No results found for: TSH  Lab Results   Component Value Date    INR 1.1 06/01/2019    INR 1.2 05/31/2019    INR 1.6 (H) 05/31/2019     Lab Results   Component Value Date    WBC 10.64 06/05/2019    HGB 8.5 (L) 06/05/2019    HCT 24.3 (L) 06/05/2019    MCV 86 06/05/2019     06/05/2019     BMP  Sodium   Date Value Ref Range Status   07/15/2019 135 (L) 136 - 145 mmol/L Final     Potassium   Date Value Ref Range Status   07/15/2019 4.1 3.5 - 5.1 mmol/L Final     Chloride   Date Value Ref Range Status   07/15/2019 101 95 - 110 mmol/L Final     CO2   Date Value Ref Range Status   07/15/2019 26 23 - 29 mmol/L Final     BUN, Bld   Date Value Ref Range Status   07/15/2019 8 8 - 23 mg/dL Final     Creatinine   Date Value Ref Range Status   07/15/2019 0.9 0.5 - 1.4 mg/dL Final      Calcium   Date Value Ref Range Status   07/15/2019 9.3 8.7 - 10.5 mg/dL Final     Anion Gap   Date Value Ref Range Status   07/15/2019 8 8 - 16 mmol/L Final     eGFR if    Date Value Ref Range Status   07/15/2019 >60.0 >60 mL/min/1.73 m^2 Final     eGFR if non    Date Value Ref Range Status   07/15/2019 >60.0 >60 mL/min/1.73 m^2 Final     Comment:     Calculation used to obtain the estimated glomerular filtration  rate (eGFR) is the CKD-EPI equation.        BNP  @LABRCNTIP(BNP,BNPTRIAGEBLO)@  @LABRCNTIP(troponini)@  CrCl cannot be calculated (Patient's most recent lab result is older than the maximum 7 days allowed.).  No results found in the last 24 hours.  No results found in the last 24 hours.  No results found in the last 24 hours.    Assessment:      1. CAD, multiple vessel    2. S/P CABG x 5    3. Essential hypertension    4. Mixed hyperlipidemia    5. RBBB        Plan:   Continue ASA , metoprolol and pravastatin  Check CBC, CMP and Lipid profile now  Counseled DASH  Check Lipid profile in 6 months  Recommend heart-healthy diet, weight control and regular exercise.  Pablo. Risk modification.   RTC in 6 months    I have reviewed all pertinent labs and cardiac studies independently. Plans and recommendations have been formulated under my direct supervision. All questions answered and patient voiced understanding. Patient to continue current medications.   Return sooner for concerns or questions.   If symptoms persist go to the ED

## 2020-07-16 DIAGNOSIS — I25.119 CORONARY ARTERY DISEASE INVOLVING NATIVE CORONARY ARTERY OF NATIVE HEART WITH ANGINA PECTORIS: Primary | ICD-10-CM

## 2020-07-17 ENCOUNTER — TELEPHONE (OUTPATIENT)
Dept: CARDIOLOGY | Facility: CLINIC | Age: 75
End: 2020-07-17

## 2020-07-17 NOTE — TELEPHONE ENCOUNTER
Patient was contacted and was advised that his  LIPID CONTROLLED  CONTINUE CURRENT rX   the patient stated understanding with no question or concerns

## 2020-08-06 DIAGNOSIS — E78.2 MIXED HYPERLIPIDEMIA: ICD-10-CM

## 2020-08-06 NOTE — TELEPHONE ENCOUNTER
----- Message from Joann Strong sent at 8/6/2020 10:56 AM CDT -----  Regarding: refill  Contact: pt  The pt states she needs a 90 day supply on his Pravastatin medication called into [facility] in [place], the pt can be reached at [phone]///[email or web address]

## 2020-08-07 RX ORDER — PRAVASTATIN SODIUM 40 MG/1
80 TABLET ORAL DAILY
Qty: 90 TABLET | Refills: 3 | Status: SHIPPED | OUTPATIENT
Start: 2020-08-07 | End: 2020-10-27 | Stop reason: SDUPTHER

## 2020-10-27 DIAGNOSIS — E78.2 MIXED HYPERLIPIDEMIA: ICD-10-CM

## 2020-10-27 RX ORDER — PRAVASTATIN SODIUM 40 MG/1
80 TABLET ORAL DAILY
Qty: 90 TABLET | Refills: 3 | Status: SHIPPED | OUTPATIENT
Start: 2020-10-27 | End: 2020-10-27

## 2020-10-27 RX ORDER — PRAVASTATIN SODIUM 80 MG/1
80 TABLET ORAL DAILY
COMMUNITY
End: 2020-10-27 | Stop reason: SDUPTHER

## 2020-10-27 RX ORDER — PRAVASTATIN SODIUM 80 MG/1
80 TABLET ORAL DAILY
Qty: 90 TABLET | Refills: 3 | Status: SHIPPED | OUTPATIENT
Start: 2020-10-27 | End: 2021-01-08

## 2020-10-27 NOTE — TELEPHONE ENCOUNTER
----- Message from Cuca Bonner sent at 10/27/2020  9:54 AM CDT -----  Contact: Chance/ [facility]  Type:  Pharmacy Calling to Clarify an RX    Name of Caller: Chance   Pharmacy Name: [facility]   Prescription Name: pravastatin (PRAVACHOL) 40 MG tablet  What do they need to clarify?: change the quantity or mg due to the amount taken a day   Best Call Back Number: [phone]  Additional Information:     Thanks,  Pb

## 2020-12-16 ENCOUNTER — OFFICE VISIT (OUTPATIENT)
Dept: CARDIOLOGY | Facility: CLINIC | Age: 75
End: 2020-12-16
Payer: MEDICARE

## 2020-12-16 VITALS
SYSTOLIC BLOOD PRESSURE: 112 MMHG | HEART RATE: 55 BPM | BODY MASS INDEX: 26.07 KG/M2 | DIASTOLIC BLOOD PRESSURE: 68 MMHG | WEIGHT: 166.44 LBS | OXYGEN SATURATION: 98 %

## 2020-12-16 DIAGNOSIS — I25.119 CORONARY ARTERY DISEASE INVOLVING NATIVE CORONARY ARTERY OF NATIVE HEART WITH ANGINA PECTORIS: ICD-10-CM

## 2020-12-16 DIAGNOSIS — Z95.1 S/P CABG X 5: Primary | ICD-10-CM

## 2020-12-16 DIAGNOSIS — I25.10 CAD, MULTIPLE VESSEL: ICD-10-CM

## 2020-12-16 DIAGNOSIS — I10 ESSENTIAL HYPERTENSION: ICD-10-CM

## 2020-12-16 PROCEDURE — 99999 PR PBB SHADOW E&M-EST. PATIENT-LVL III: CPT | Mod: PBBFAC,,, | Performed by: INTERNAL MEDICINE

## 2020-12-16 PROCEDURE — 99214 PR OFFICE/OUTPT VISIT, EST, LEVL IV, 30-39 MIN: ICD-10-PCS | Mod: S$PBB,,, | Performed by: INTERNAL MEDICINE

## 2020-12-16 PROCEDURE — 99213 OFFICE O/P EST LOW 20 MIN: CPT | Mod: PBBFAC,PO | Performed by: INTERNAL MEDICINE

## 2020-12-16 PROCEDURE — 99214 OFFICE O/P EST MOD 30 MIN: CPT | Mod: S$PBB,,, | Performed by: INTERNAL MEDICINE

## 2020-12-16 PROCEDURE — 99999 PR PBB SHADOW E&M-EST. PATIENT-LVL III: ICD-10-PCS | Mod: PBBFAC,,, | Performed by: INTERNAL MEDICINE

## 2020-12-16 NOTE — PROGRESS NOTES
Subjective:   Patient ID:  Chapin Calderon is a 75 y.o. male who presents for follow up of No chief complaint on file.      76 YO male came in for 8 months f/u  PMH CAD s/p CABGx5 on 05/31/2019, normal EF and HTN RBBB   admitted for NSTEMI, urgent LHC showed multivessel severe CAD and had CABGx5, LIMA to LAD and reverse saphenous vein graft to diagonal 1, OM 1 and a sequence reverse saphenous vein graft to the PDA and the FARHAN. by Dr. Dorado, post op course was uncomplicated.  Today, states that does yard work. Lives with his wife.   Surgical wounds on chest and leg healing well. Chronic numbness on left chest wall.   No Chest pain, dyspnea, dizziness, palpitation.   No smoking/drinking  Med compliance  Plavix off by patient due to refractory diarrhea. Now ASA 81mg, statin and metoprolol  EKG NSR and RBBB, new compared to prior one in     LDL 87 in .   Pt states that could not tolerate Lipitor but not remember the reason  Now on Pravastatin 80 mg daily        Past Medical History:   Diagnosis Date    CAD, multiple vessel 6/1/2019    Prostate cancer        Past Surgical History:   Procedure Laterality Date    APPENDECTOMY      CORONARY ARTERY BYPASS GRAFT (CABG) N/A 5/31/2019    Procedure: CORONARY ARTERY BYPASS GRAFT (CABG);  Surgeon: Mani Dorado MD;  Location: [facility] OR;  Service: Cardiothoracic;  Laterality: N/A;  5 VESSEL    ENDOSCOPIC HARVEST OF VEIN Bilateral 5/31/2019    Procedure: SURGICAL PROCUREMENT, VEIN, ENDOSCOPIC;  Surgeon: Mani Dorado MD;  Location: [facility] OR;  Service: Cardiothoracic;  Laterality: Bilateral;    LEFT HEART CATHETERIZATION Left 5/29/2019    Procedure: CATHETERIZATION, HEART, LEFT;  Surgeon: Reid Chase MD;  Location: [facility] CATH LAB;  Service: Cardiology;  Laterality: Left;       Social History     Tobacco Use    Smoking status: Former Smoker    Smokeless tobacco: Never Used   Substance Use Topics    Alcohol use: No    Drug use: No       History  reviewed. No pertinent family history.      Review of Systems   Constitution: Negative for decreased appetite, diaphoresis, fever, malaise/fatigue and night sweats.   HENT: Negative for nosebleeds.    Eyes: Negative for blurred vision and double vision.   Cardiovascular: Negative for chest pain, claudication, dyspnea on exertion, irregular heartbeat, near-syncope, orthopnea, palpitations, paroxysmal nocturnal dyspnea and syncope.   Respiratory: Negative for cough, shortness of breath, sleep disturbances due to breathing, snoring, sputum production and wheezing.    Endocrine: Negative for cold intolerance and polyuria.   Hematologic/Lymphatic: Does not bruise/bleed easily.   Skin: Negative for rash. Color change: both legs.   Musculoskeletal: Negative for back pain, falls, joint pain, joint swelling and neck pain.   Gastrointestinal: Negative for abdominal pain, heartburn, nausea and vomiting.   Genitourinary: Negative for dysuria, frequency and hematuria.   Neurological: Negative for difficulty with concentration, dizziness, focal weakness, headaches, light-headedness, numbness, seizures and weakness.   Psychiatric/Behavioral: Negative for depression, memory loss and substance abuse. The patient does not have insomnia.    Allergic/Immunologic: Negative for HIV exposure and hives.       Objective:   Physical Exam   Constitutional: He is oriented to person, place, and time. He appears well-nourished.   HENT:   Head: Normocephalic.   Eyes: Pupils are equal, round, and reactive to light.   Neck: Normal carotid pulses and no JVD present. Carotid bruit is not present. No thyromegaly present.   Cardiovascular: Normal rate, regular rhythm, normal heart sounds and normal pulses.  No extrasystoles are present. PMI is not displaced. Exam reveals no gallop and no S3.   No murmur heard.  Pulmonary/Chest: Breath sounds normal. No stridor. No respiratory distress.   Surgical wound healing well on sternus   Abdominal: Soft. Bowel  sounds are normal. There is no abdominal tenderness. There is no rebound.   Musculoskeletal: Normal range of motion.   Neurological: He is alert and oriented to person, place, and time.   Skin: Skin is intact. No rash noted.   Leg bruises   Psychiatric: His behavior is normal.       Lab Results   Component Value Date    CHOL 150 07/15/2020    CHOL 108 (L) 07/15/2019    CHOL 194 05/29/2019     Lab Results   Component Value Date    HDL 41 07/15/2020    HDL 32 (L) 07/15/2019    HDL 42 05/29/2019     Lab Results   Component Value Date    LDLCALC 86.6 07/15/2020    LDLCALC 62.0 (L) 07/15/2019    LDLCALC 134.0 05/29/2019     Lab Results   Component Value Date    TRIG 112 07/15/2020    TRIG 70 07/15/2019    TRIG 90 05/29/2019     Lab Results   Component Value Date    CHOLHDL 27.3 07/15/2020    CHOLHDL 29.6 07/15/2019    CHOLHDL 21.6 05/29/2019       Chemistry        Component Value Date/Time     07/15/2020 0933    K 5.0 07/15/2020 0933     07/15/2020 0933    CO2 30 (H) 07/15/2020 0933    BUN 13 07/15/2020 0933    CREATININE 1.0 07/15/2020 0933    GLU 87 07/15/2020 0933        Component Value Date/Time    CALCIUM 9.9 07/15/2020 0933    ALKPHOS 85 07/15/2020 0933    AST 23 07/15/2020 0933    ALT 12 07/15/2020 0933    BILITOT 0.6 07/15/2020 0933    ESTGFRAFRICA >60.0 07/15/2020 0933    EGFRNONAA >60.0 07/15/2020 0933          Lab Results   Component Value Date    HGBA1C 5.3 05/30/2019     No results found for: TSH  Lab Results   Component Value Date    INR 1.1 06/01/2019    INR 1.2 05/31/2019    INR 1.6 (H) 05/31/2019     Lab Results   Component Value Date    WBC 9.54 07/15/2020    HGB 15.4 07/15/2020    HCT 44.9 07/15/2020    MCV 89 07/15/2020     07/15/2020     BMP  Sodium   Date Value Ref Range Status   07/15/2020 137 136 - 145 mmol/L Final     Potassium   Date Value Ref Range Status   07/15/2020 5.0 3.5 - 5.1 mmol/L Final     Chloride   Date Value Ref Range Status   07/15/2020 101 95 - 110 mmol/L Final      CO2   Date Value Ref Range Status   07/15/2020 30 (H) 23 - 29 mmol/L Final     BUN   Date Value Ref Range Status   07/15/2020 13 8 - 23 mg/dL Final     Creatinine   Date Value Ref Range Status   07/15/2020 1.0 0.5 - 1.4 mg/dL Final     Calcium   Date Value Ref Range Status   07/15/2020 9.9 8.7 - 10.5 mg/dL Final     Anion Gap   Date Value Ref Range Status   07/15/2020 6 (L) 8 - 16 mmol/L Final     eGFR if    Date Value Ref Range Status   07/15/2020 >60.0 >60 mL/min/1.73 m^2 Final     eGFR if non    Date Value Ref Range Status   07/15/2020 >60.0 >60 mL/min/1.73 m^2 Final     Comment:     Calculation used to obtain the estimated glomerular filtration  rate (eGFR) is the CKD-EPI equation.        BNP  @LABRCNTIP(BNP,BNPTRIAGEBLO)@  @LABRCNTIP(troponini)@  CrCl cannot be calculated (Patient's most recent lab result is older than the maximum 7 days allowed.).  No results found in the last 24 hours.  No results found in the last 24 hours.  No results found in the last 24 hours.    Assessment:      1. S/P CABG x 5    2. CAD, multiple vessel    3. Coronary artery disease involving native coronary artery of native heart with angina pectoris    4. Essential hypertension        Plan:   Add repatha  Continue Pravastatin 80mg daily, ASa and Metoprolol    Counseled DASH  Check Lipid profile in 6 months  Recommend heart-healthy diet, weight control and regular exercise.  Pablo. Risk modification.   I have reviewed all pertinent labs and cardiac studies independently. Plans and recommendations have been formulated under my direct supervision. All questions answered and patient voiced understanding.   If symptoms persist go to the ED  RTC in 6 months

## 2020-12-30 ENCOUNTER — SPECIALTY PHARMACY (OUTPATIENT)
Dept: PHARMACY | Facility: CLINIC | Age: 75
End: 2020-12-30

## 2021-01-08 ENCOUNTER — TELEPHONE (OUTPATIENT)
Dept: CARDIOLOGY | Facility: CLINIC | Age: 76
End: 2021-01-08

## 2021-01-08 RX ORDER — ATORVASTATIN CALCIUM 80 MG/1
80 TABLET, FILM COATED ORAL DAILY
Qty: 90 TABLET | Refills: 3 | Status: SHIPPED | OUTPATIENT
Start: 2021-01-08 | End: 2022-01-08

## 2021-01-10 ENCOUNTER — IMMUNIZATION (OUTPATIENT)
Dept: INTERNAL MEDICINE | Facility: CLINIC | Age: 76
End: 2021-01-10
Payer: MEDICARE

## 2021-01-10 DIAGNOSIS — Z23 NEED FOR VACCINATION: ICD-10-CM

## 2021-01-10 PROCEDURE — 91300 COVID-19, MRNA, LNP-S, PF, 30 MCG/0.3 ML DOSE VACCINE: CPT | Mod: PBBFAC

## 2021-01-31 ENCOUNTER — IMMUNIZATION (OUTPATIENT)
Dept: INTERNAL MEDICINE | Facility: CLINIC | Age: 76
End: 2021-01-31
Payer: MEDICARE

## 2021-01-31 DIAGNOSIS — Z23 NEED FOR VACCINATION: Primary | ICD-10-CM

## 2021-01-31 PROCEDURE — 91300 COVID-19, MRNA, LNP-S, PF, 30 MCG/0.3 ML DOSE VACCINE: CPT | Mod: PBBFAC

## 2021-01-31 PROCEDURE — 0002A COVID-19, MRNA, LNP-S, PF, 30 MCG/0.3 ML DOSE VACCINE: CPT | Mod: PBBFAC

## 2021-06-14 DIAGNOSIS — I10 ESSENTIAL HYPERTENSION: Primary | ICD-10-CM

## 2021-06-16 ENCOUNTER — OFFICE VISIT (OUTPATIENT)
Dept: CARDIOLOGY | Facility: CLINIC | Age: 76
End: 2021-06-16
Payer: MEDICARE

## 2021-06-16 ENCOUNTER — CLINICAL SUPPORT (OUTPATIENT)
Dept: CARDIOLOGY | Facility: CLINIC | Age: 76
End: 2021-06-16
Payer: MEDICARE

## 2021-06-16 VITALS
DIASTOLIC BLOOD PRESSURE: 64 MMHG | WEIGHT: 164.81 LBS | SYSTOLIC BLOOD PRESSURE: 104 MMHG | HEART RATE: 54 BPM | OXYGEN SATURATION: 97 % | BODY MASS INDEX: 25.81 KG/M2

## 2021-06-16 DIAGNOSIS — I25.119 CORONARY ARTERY DISEASE INVOLVING NATIVE CORONARY ARTERY OF NATIVE HEART WITH ANGINA PECTORIS: Primary | ICD-10-CM

## 2021-06-16 DIAGNOSIS — E78.2 MIXED HYPERLIPIDEMIA: ICD-10-CM

## 2021-06-16 DIAGNOSIS — I45.10 RBBB: ICD-10-CM

## 2021-06-16 DIAGNOSIS — I10 ESSENTIAL HYPERTENSION: ICD-10-CM

## 2021-06-16 DIAGNOSIS — I20.9 AP (ANGINA PECTORIS): ICD-10-CM

## 2021-06-16 DIAGNOSIS — R94.31 ABNORMAL ECG: ICD-10-CM

## 2021-06-16 DIAGNOSIS — Z95.1 S/P CABG X 5: ICD-10-CM

## 2021-06-16 PROCEDURE — 93010 ELECTROCARDIOGRAM REPORT: CPT | Mod: S$PBB,,, | Performed by: INTERNAL MEDICINE

## 2021-06-16 PROCEDURE — 99214 PR OFFICE/OUTPT VISIT, EST, LEVL IV, 30-39 MIN: ICD-10-PCS | Mod: S$PBB,,, | Performed by: INTERNAL MEDICINE

## 2021-06-16 PROCEDURE — 99213 OFFICE O/P EST LOW 20 MIN: CPT | Mod: PBBFAC,25,PO | Performed by: INTERNAL MEDICINE

## 2021-06-16 PROCEDURE — 93010 EKG 12-LEAD: ICD-10-PCS | Mod: S$PBB,,, | Performed by: INTERNAL MEDICINE

## 2021-06-16 PROCEDURE — 99214 OFFICE O/P EST MOD 30 MIN: CPT | Mod: S$PBB,,, | Performed by: INTERNAL MEDICINE

## 2021-06-16 PROCEDURE — 93005 ELECTROCARDIOGRAM TRACING: CPT | Mod: PBBFAC,PO | Performed by: INTERNAL MEDICINE

## 2021-06-16 PROCEDURE — 99999 PR PBB SHADOW E&M-EST. PATIENT-LVL III: ICD-10-PCS | Mod: PBBFAC,,, | Performed by: INTERNAL MEDICINE

## 2021-06-16 PROCEDURE — 99999 PR PBB SHADOW E&M-EST. PATIENT-LVL III: CPT | Mod: PBBFAC,,, | Performed by: INTERNAL MEDICINE

## 2021-06-24 NOTE — PROGRESS NOTES
New pharmacy , need new RX     Pt clipped from chin to toes and night time hibiclens bath completed per order. Will receive additional morning hibiclens bath for pre-op when called for surgery

## 2021-07-27 ENCOUNTER — OFFICE VISIT (OUTPATIENT)
Dept: URGENT CARE | Facility: CLINIC | Age: 76
End: 2021-07-27
Payer: MEDICARE

## 2021-07-27 VITALS
DIASTOLIC BLOOD PRESSURE: 64 MMHG | BODY MASS INDEX: 25.81 KG/M2 | WEIGHT: 164.81 LBS | TEMPERATURE: 99 F | OXYGEN SATURATION: 97 % | HEART RATE: 61 BPM | SYSTOLIC BLOOD PRESSURE: 144 MMHG

## 2021-07-27 DIAGNOSIS — J02.9 SORE THROAT: Primary | ICD-10-CM

## 2021-07-27 PROCEDURE — 99214 OFFICE O/P EST MOD 30 MIN: CPT | Mod: S$PBB,,, | Performed by: PHYSICIAN ASSISTANT

## 2021-07-27 PROCEDURE — 99213 OFFICE O/P EST LOW 20 MIN: CPT | Mod: PBBFAC,PO | Performed by: PHYSICIAN ASSISTANT

## 2021-07-27 PROCEDURE — 99999 PR PBB SHADOW E&M-EST. PATIENT-LVL III: CPT | Mod: PBBFAC,,, | Performed by: PHYSICIAN ASSISTANT

## 2021-07-27 PROCEDURE — 99999 PR PBB SHADOW E&M-EST. PATIENT-LVL III: ICD-10-PCS | Mod: PBBFAC,,, | Performed by: PHYSICIAN ASSISTANT

## 2021-07-27 PROCEDURE — 99214 PR OFFICE/OUTPT VISIT, EST, LEVL IV, 30-39 MIN: ICD-10-PCS | Mod: S$PBB,,, | Performed by: PHYSICIAN ASSISTANT

## 2021-08-27 ENCOUNTER — PATIENT OUTREACH (OUTPATIENT)
Dept: ADMINISTRATIVE | Facility: HOSPITAL | Age: 76
End: 2021-08-27

## 2021-09-10 ENCOUNTER — PATIENT OUTREACH (OUTPATIENT)
Dept: ADMINISTRATIVE | Facility: HOSPITAL | Age: 76
End: 2021-09-10

## 2021-09-17 ENCOUNTER — TELEPHONE (OUTPATIENT)
Dept: CARDIOLOGY | Facility: CLINIC | Age: 76
End: 2021-09-17

## 2021-09-27 ENCOUNTER — IMMUNIZATION (OUTPATIENT)
Dept: PHARMACY | Facility: CLINIC | Age: 76
End: 2021-09-27
Payer: MEDICARE

## 2021-09-27 DIAGNOSIS — Z23 NEED FOR VACCINATION: Primary | ICD-10-CM

## 2022-12-08 NOTE — ASSESSMENT & PLAN NOTE
POD # 2:     Paced: Mode AAI, Rate 90, A mA 10, V mA 10:  normal pacemaker function and stable pacing and sensing thresholds.     STERNAL INCISION: staples in place; wound clean, dry, and intact,     STABLE HEMODYNAMICS.    Monitor hemodynamics and  monitor for dysrhythmias MAP goal of  65 mmHg.      CARDIAC STERNAL PRECAUTIONS.       CARDIOACTIVE MEDS:   EPINEPHRINE,CARDENE      DIURETICS:  FUROSEMIDE     No

## (undated) DEVICE — SUT PERMA HAND SILK BLK 0-0

## (undated) DEVICE — BLOWER MISTER

## (undated) DEVICE — ORGNZR TUBING CLR W/CLIPS

## (undated) DEVICE — Device

## (undated) DEVICE — SOL NS 1000CC

## (undated) DEVICE — CATH ALL PUR URTHL RR 18FR

## (undated) DEVICE — CLIP STEALTH LATIS 1/4 FORCE 6

## (undated) DEVICE — EVACUATOR WOUND BULB 100CC

## (undated) DEVICE — SET SUCTION ANTICOAGULANT

## (undated) DEVICE — SEE MEDLINE ITEM 146231

## (undated) DEVICE — COVER SURG LIGHT HANDLE

## (undated) DEVICE — INSERTS STEALTH FIBRA SIZE 5

## (undated) DEVICE — PUNCH AORTIC SHORT 4.4MM

## (undated) DEVICE — SUT STEEL 7 MONO B&S18 CCS

## (undated) DEVICE — CANNULA IMA 1MM

## (undated) DEVICE — CONTAINER SPECIMEN STRL 4OZ

## (undated) DEVICE — DRESSING MEPORE ISLAND 31/2X4

## (undated) DEVICE — SOL ISOLYTE S PH 7.4 1000ML

## (undated) DEVICE — SEE MEDLINE ITEM 152739

## (undated) DEVICE — DRESSING MEPORE 3.6 X 10

## (undated) DEVICE — SEE MEDLINE ITEM 157144

## (undated) DEVICE — TUBING PRESSURE MALE/FEMALE

## (undated) DEVICE — SUT PROLENE 4-0 SH BLU 36IN

## (undated) DEVICE — DEV-O-LOOPS MAXI RED

## (undated) DEVICE — CANNULA AG CARDPLG 14G FLANGE

## (undated) DEVICE — CANNULA VSL W/DUCKBILL

## (undated) DEVICE — DRAPE STERI INSTRUMENT 1018

## (undated) DEVICE — TAPE MEDIPORE 4IN X 2YDS

## (undated) DEVICE — GLOVE 6.0 PROTEXIS PI MICRO

## (undated) DEVICE — SHEET DRAPE FAN-FOLDED 3/4

## (undated) DEVICE — CLIPS LIGATING TITANIUM WDE SM

## (undated) DEVICE — SEE MEDLINE ITEM 157117

## (undated) DEVICE — CANNULA VENOUS MC2X 29FR

## (undated) DEVICE — ELECTRODE BLADE E-Z CLEAN 4IN

## (undated) DEVICE — CABLE PACEMAKER CARD 6FT BLUE

## (undated) DEVICE — SUT PROLENE 6-0 C-1 30IN BL

## (undated) DEVICE — DRAIN CHEST DRY SUCTION

## (undated) DEVICE — SYR 20ML BLUE LUER LOCK

## (undated) DEVICE — STOPCOCK DISCOFIX 3 WAY

## (undated) DEVICE — CELL SAVER 4/CASE

## (undated) DEVICE — SYS VEIN HARVESTING

## (undated) DEVICE — SUT MONOCRYL 4-0 PS-1 UND

## (undated) DEVICE — SYR 30CC LUER LOCK

## (undated) DEVICE — CONNECTOR 1/2X3/8 STERILE

## (undated) DEVICE — TUBING PRESS MON M/M 84IN

## (undated) DEVICE — CLOSURE SKIN 1/4INX1-1/2IN

## (undated) DEVICE — DRAPE INCISE IOBAN 2 23X17IN

## (undated) DEVICE — SEE MEDLINE ITEM 152622

## (undated) DEVICE — SUT 8/0 24IN PROLENE BL MON

## (undated) DEVICE — KIT PROBE COVER WITH GEL

## (undated) DEVICE — DRAIN CHAN RND HUBLS 8MM 24FR

## (undated) DEVICE — SEE MEDLINE ITEM 146308

## (undated) DEVICE — TRAY FOLEY SURESTEP 16FR

## (undated) DEVICE — GOWN SURG 2XL DISP TIE BACK

## (undated) DEVICE — ADHESIVE MASTISOL VIAL 48/BX

## (undated) DEVICE — SUT VICRYL 1 OB 36 CTX

## (undated) DEVICE — GAUZE SPONGE 4X4 12PLY

## (undated) DEVICE — SUT SILK 2-0 STRANDS 30IN

## (undated) DEVICE — SUT VICRYL 2-0 36 CT-1

## (undated) DEVICE — GLOVE BIOGEL 7.5

## (undated) DEVICE — DRESSING TRANS 4X4 TEGADERM

## (undated) DEVICE — SET PERFUSION

## (undated) DEVICE — SUT PLEDGET LG SOFT

## (undated) DEVICE — SUT 7/0 24IN PROLENE BL MO

## (undated) DEVICE — SEE MEDLINE ITEM 146417

## (undated) DEVICE — ELECTRODE REM PLYHSV RETURN 9

## (undated) DEVICE — TUBING PNEUMO

## (undated) DEVICE — SPONGE LAP 18X18 PREWASHED

## (undated) DEVICE — PACK OPEN HEART BR

## (undated) DEVICE — CONNECTOR STRAIGHT 1/4X1/4IN

## (undated) DEVICE — BLADE SCALP OPHTL BEVEL STR

## (undated) DEVICE — DRAIN CHANNEL ROUND 19FR

## (undated) DEVICE — BLADE KNIFE UNITOME 4.0MM

## (undated) DEVICE — BOOT SUTURE AID

## (undated) DEVICE — NDL SAFETY 22G X 1.5 ECLIPSE

## (undated) DEVICE — RETRACTOR OCTOBASE INSERT HOLD

## (undated) DEVICE — BLADE SURG CARBON STEEL SZ11

## (undated) DEVICE — SOL 9P NACL IRR PIC IL

## (undated) DEVICE — DRESSING TRANS 2X2 TEGADERM

## (undated) DEVICE — SEE MEDLINE ITEM 146347

## (undated) DEVICE — CANNULA 21FR 7MM SOFT FLOW EXT

## (undated) DEVICE — DRAPE SLUSH WARMER WITH DISC

## (undated) DEVICE — WIRE TEMP PACING 0 SH SKS-3

## (undated) DEVICE — CLIP LIGATING MEDIUM

## (undated) DEVICE — THERMOWRAP CARDIAC

## (undated) DEVICE — TOWEL WHITE OR DISP

## (undated) DEVICE — DRESSING MEPORE ADH 3.5X12

## (undated) DEVICE — SOL WATER STRL IRR 1000ML

## (undated) DEVICE — SUT SILK 2-0 SH 18IN BLACK

## (undated) DEVICE — SUT PROLENE 4-0 RB-1 BL MO

## (undated) DEVICE — COUNTER BDL BLADEGUARD LI DBL

## (undated) DEVICE — SUT SILK 1 6-30 LABYRINTH

## (undated) DEVICE — CANNULA PERF RCSP 15FR SINUS

## (undated) DEVICE — SET DECANTER MEDICHOICE

## (undated) DEVICE — KIT ANTIFOG